# Patient Record
Sex: FEMALE | Race: WHITE | Employment: UNEMPLOYED | URBAN - METROPOLITAN AREA
[De-identification: names, ages, dates, MRNs, and addresses within clinical notes are randomized per-mention and may not be internally consistent; named-entity substitution may affect disease eponyms.]

---

## 2017-11-09 ENCOUNTER — HOSPITAL ENCOUNTER (OUTPATIENT)
Dept: MAMMOGRAPHY | Age: 42
Discharge: HOME | End: 2017-11-09
Attending: INTERNAL MEDICINE
Payer: COMMERCIAL

## 2017-11-09 DIAGNOSIS — Z12.39 BREAST CANCER SCREENING: ICD-10-CM

## 2017-11-09 PROCEDURE — 77067 SCR MAMMO BI INCL CAD: CPT

## 2019-01-07 ENCOUNTER — HOSPITAL ENCOUNTER (OUTPATIENT)
Dept: MAMMOGRAPHY | Age: 44
Discharge: HOME | End: 2019-01-07
Attending: INTERNAL MEDICINE
Payer: COMMERCIAL

## 2019-01-07 ENCOUNTER — HOSPITAL ENCOUNTER (OUTPATIENT)
Dept: ULTRASOUND IMAGING | Age: 44
Discharge: HOME | End: 2019-01-07
Attending: INTERNAL MEDICINE
Payer: COMMERCIAL

## 2019-01-07 DIAGNOSIS — Z12.31 ENCOUNTER FOR SCREENING MAMMOGRAM FOR MALIGNANT NEOPLASM OF BREAST: ICD-10-CM

## 2019-01-07 DIAGNOSIS — N85.2 HYPERTROPHY OF UTERUS: ICD-10-CM

## 2019-01-07 PROCEDURE — 76830 TRANSVAGINAL US NON-OB: CPT

## 2019-01-07 PROCEDURE — 77067 SCR MAMMO BI INCL CAD: CPT

## 2019-01-07 PROCEDURE — 76856 US EXAM PELVIC COMPLETE: CPT

## 2019-01-11 ENCOUNTER — HOSPITAL ENCOUNTER (OUTPATIENT)
Dept: MAMMOGRAPHY | Age: 44
Discharge: HOME | End: 2019-01-11
Attending: INTERNAL MEDICINE
Payer: COMMERCIAL

## 2019-01-11 ENCOUNTER — HOSPITAL ENCOUNTER (OUTPATIENT)
Dept: ULTRASOUND IMAGING | Age: 44
Discharge: HOME | End: 2019-01-11
Attending: INTERNAL MEDICINE
Payer: COMMERCIAL

## 2019-01-11 DIAGNOSIS — R92.8 ABNORMAL MAMMOGRAM: ICD-10-CM

## 2019-01-11 PROCEDURE — 76641 ULTRASOUND BREAST COMPLETE: CPT

## 2019-01-11 PROCEDURE — 76642 ULTRASOUND BREAST LIMITED: CPT

## 2019-01-11 PROCEDURE — 77065 DX MAMMO INCL CAD UNI: CPT

## 2019-01-18 ENCOUNTER — HOSPITAL ENCOUNTER (OUTPATIENT)
Dept: MAMMOGRAPHY | Age: 44
Discharge: HOME | End: 2019-01-18
Payer: COMMERCIAL

## 2019-01-18 DIAGNOSIS — N63.10 BREAST MASS, RIGHT: ICD-10-CM

## 2019-01-18 DIAGNOSIS — N63.0 BREAST MASS IN FEMALE: ICD-10-CM

## 2019-01-18 DIAGNOSIS — R92.8 ABNORMAL MAMMOGRAM: ICD-10-CM

## 2019-01-18 PROCEDURE — 74011250636 HC RX REV CODE- 250/636: Performed by: RADIOLOGY

## 2019-01-18 PROCEDURE — 19083 BX BREAST 1ST LESION US IMAG: CPT

## 2019-01-18 PROCEDURE — 77065 DX MAMMO INCL CAD UNI: CPT

## 2019-01-18 RX ORDER — LIDOCAINE HYDROCHLORIDE AND EPINEPHRINE 10; 10 MG/ML; UG/ML
8 INJECTION, SOLUTION INFILTRATION; PERINEURAL ONCE
Status: DISPENSED | OUTPATIENT
Start: 2019-01-18 | End: 2019-01-18

## 2019-01-18 RX ORDER — LIDOCAINE HYDROCHLORIDE 10 MG/ML
12 INJECTION INFILTRATION; PERINEURAL
Status: COMPLETED | OUTPATIENT
Start: 2019-01-18 | End: 2019-01-18

## 2019-01-18 RX ORDER — SODIUM BICARBONATE 42 MG/ML
5 INJECTION, SOLUTION INTRAVENOUS
Status: DISPENSED | OUTPATIENT
Start: 2019-01-18 | End: 2019-01-18

## 2019-01-18 RX ADMIN — LIDOCAINE HYDROCHLORIDE 12 ML: 10 INJECTION, SOLUTION INFILTRATION; PERINEURAL at 09:38

## 2019-01-18 NOTE — PROGRESS NOTES
10:15am  Pressure held to biopsy site. Minimal bleeding noted. Dressing remained dry and intact post breast clip mammogram.  Post biopsy discharge instructions reviewed with patient and patient given a copy. Ice pack applied over transparent dressing. No oozing hematoma or bleeding post biopsy.

## 2020-03-10 ENCOUNTER — HOSPITAL ENCOUNTER (EMERGENCY)
Age: 45
Discharge: HOME | End: 2020-03-10
Attending: EMERGENCY MEDICINE | Admitting: EMERGENCY MEDICINE
Payer: COMMERCIAL

## 2020-03-10 VITALS
DIASTOLIC BLOOD PRESSURE: 73 MMHG | HEART RATE: 77 BPM | TEMPERATURE: 98 F | RESPIRATION RATE: 14 BRPM | OXYGEN SATURATION: 100 % | HEIGHT: 64 IN | BODY MASS INDEX: 22.2 KG/M2 | WEIGHT: 130 LBS | SYSTOLIC BLOOD PRESSURE: 116 MMHG

## 2020-03-10 DIAGNOSIS — M54.42 ACUTE LEFT-SIDED LOW BACK PAIN WITH LEFT-SIDED SCIATICA: Primary | ICD-10-CM

## 2020-03-10 LAB
COMMENT, HOLDF: NORMAL
HCG SERPL QL: NEGATIVE
SAMPLES BEING HELD,HOLD: NORMAL

## 2020-03-10 PROCEDURE — 96374 THER/PROPH/DIAG INJ IV PUSH: CPT

## 2020-03-10 PROCEDURE — 74011250636 HC RX REV CODE- 250/636: Performed by: PHYSICIAN ASSISTANT

## 2020-03-10 PROCEDURE — 96375 TX/PRO/DX INJ NEW DRUG ADDON: CPT

## 2020-03-10 PROCEDURE — 74011250637 HC RX REV CODE- 250/637: Performed by: PHYSICIAN ASSISTANT

## 2020-03-10 PROCEDURE — 99283 EMERGENCY DEPT VISIT LOW MDM: CPT

## 2020-03-10 PROCEDURE — 36415 COLL VENOUS BLD VENIPUNCTURE: CPT

## 2020-03-10 PROCEDURE — 84703 CHORIONIC GONADOTROPIN ASSAY: CPT

## 2020-03-10 RX ORDER — METFORMIN HYDROCHLORIDE 1000 MG/1
2000 TABLET ORAL 2 TIMES DAILY WITH MEALS
COMMUNITY

## 2020-03-10 RX ORDER — MORPHINE SULFATE 2 MG/ML
4 INJECTION, SOLUTION INTRAMUSCULAR; INTRAVENOUS
Status: COMPLETED | OUTPATIENT
Start: 2020-03-10 | End: 2020-03-10

## 2020-03-10 RX ORDER — NALOXONE HYDROCHLORIDE 4 MG/.1ML
SPRAY NASAL
Qty: 1 EACH | Refills: 0 | Status: SHIPPED | OUTPATIENT
Start: 2020-03-10

## 2020-03-10 RX ORDER — ALPRAZOLAM 1 MG/1
TABLET ORAL
COMMUNITY

## 2020-03-10 RX ORDER — NABUMETONE 750 MG/1
750 TABLET, FILM COATED ORAL 2 TIMES DAILY
COMMUNITY

## 2020-03-10 RX ORDER — METHYLPREDNISOLONE 4 MG/1
TABLET ORAL
Qty: 1 DOSE PACK | Refills: 0 | Status: SHIPPED | OUTPATIENT
Start: 2020-03-10

## 2020-03-10 RX ORDER — SERTRALINE HYDROCHLORIDE 100 MG/1
200 TABLET, FILM COATED ORAL DAILY
COMMUNITY

## 2020-03-10 RX ORDER — CYCLOBENZAPRINE HCL 10 MG
10 TABLET ORAL
Status: COMPLETED | OUTPATIENT
Start: 2020-03-10 | End: 2020-03-10

## 2020-03-10 RX ORDER — KETOROLAC TROMETHAMINE 30 MG/ML
30 INJECTION, SOLUTION INTRAMUSCULAR; INTRAVENOUS
Status: COMPLETED | OUTPATIENT
Start: 2020-03-10 | End: 2020-03-10

## 2020-03-10 RX ORDER — VALACYCLOVIR HYDROCHLORIDE 500 MG/1
500 TABLET, FILM COATED ORAL 2 TIMES DAILY
COMMUNITY

## 2020-03-10 RX ORDER — CYCLOBENZAPRINE HCL 10 MG
10 TABLET ORAL
Qty: 10 TAB | Refills: 0 | Status: SHIPPED | OUTPATIENT
Start: 2020-03-10

## 2020-03-10 RX ORDER — DEXAMETHASONE SODIUM PHOSPHATE 10 MG/ML
10 INJECTION INTRAMUSCULAR; INTRAVENOUS
Status: COMPLETED | OUTPATIENT
Start: 2020-03-10 | End: 2020-03-10

## 2020-03-10 RX ORDER — HYDROCODONE BITARTRATE AND ACETAMINOPHEN 7.5; 325 MG/1; MG/1
1 TABLET ORAL
Qty: 15 TAB | Refills: 0 | Status: SHIPPED | OUTPATIENT
Start: 2020-03-10 | End: 2020-04-09

## 2020-03-10 RX ORDER — LAMOTRIGINE 200 MG/1
200 TABLET ORAL DAILY
COMMUNITY

## 2020-03-10 RX ADMIN — SODIUM CHLORIDE 1000 ML: 900 INJECTION, SOLUTION INTRAVENOUS at 09:59

## 2020-03-10 RX ADMIN — MORPHINE SULFATE 4 MG: 2 INJECTION, SOLUTION INTRAMUSCULAR; INTRAVENOUS at 10:20

## 2020-03-10 RX ADMIN — KETOROLAC TROMETHAMINE 30 MG: 30 INJECTION, SOLUTION INTRAMUSCULAR at 10:18

## 2020-03-10 RX ADMIN — CYCLOBENZAPRINE 10 MG: 10 TABLET, FILM COATED ORAL at 10:20

## 2020-03-10 RX ADMIN — DEXAMETHASONE SODIUM PHOSPHATE 10 MG: 10 INJECTION, SOLUTION INTRAMUSCULAR; INTRAVENOUS at 10:16

## 2020-03-10 NOTE — ED PROVIDER NOTES
Alice Vega is a 45 y.o. female, presents with mom with PMH of bulging disc in her back, chronic back pain presents to emergency room ambulatory for evaluation of low back pain, radicular pain down left leg. States she has pain with weight bearing on the left leg. She is scheduled to see her spine specialist in 2 days. Hx of LYN, has had 3, last one was 10 days ago, states the nerve pain has progressively worsened and last night the pain has worsened. Feels a 'throbbing' sensation in low back around L5-S1, states if she puts pressure on her legs she feels a \"sharp\" pain in her back. Denies acute bowel/bladder incontinence, has had urinary incontinence from childbirth but not new or worse, not saddle anesthesia. Last MRI- April 2019. Denies fever, chills, vomiting, diarrhea. Denies injury the past few days. Not currently in PT. Neurontin for neuralgia and Nabumetone (NSAID) for pain relief. States sx's are like before her 2nd LYN. [address]  PCP: Ramona Means MD    Surgical hx- see chart  Social hx- see chart    The patient has no other complaints at this time. No past medical history on file. No past surgical history on file. No family history on file.     Social History     Socioeconomic History    Marital status: LEGALLY      Spouse name: Not on file    Number of children: Not on file    Years of education: Not on file    Highest education level: Not on file   Occupational History    Not on file   Social Needs    Financial resource strain: Not on file    Food insecurity:     Worry: Not on file     Inability: Not on file    Transportation needs:     Medical: Not on file     Non-medical: Not on file   Tobacco Use    Smoking status: Not on file   Substance and Sexual Activity    Alcohol use: Not on file    Drug use: Not on file    Sexual activity: Not on file   Lifestyle    Physical activity:     Days per week: Not on file     Minutes per session: Not on file    Stress: Not on file   Relationships    Social connections:     Talks on phone: Not on file     Gets together: Not on file     Attends Cheondoism service: Not on file     Active member of club or organization: Not on file     Attends meetings of clubs or organizations: Not on file     Relationship status: Not on file    Intimate partner violence:     Fear of current or ex partner: Not on file     Emotionally abused: Not on file     Physically abused: Not on file     Forced sexual activity: Not on file   Other Topics Concern    Not on file   Social History Narrative    Not on file         ALLERGIES: Latex    Review of Systems   Constitutional: Negative. Negative for activity change, chills, fatigue and unexpected weight change. HENT: Negative for trouble swallowing. Respiratory: Negative for cough, chest tightness, shortness of breath and wheezing. Cardiovascular: Negative. Negative for chest pain and palpitations. Gastrointestinal: Negative. Negative for abdominal pain, diarrhea, nausea and vomiting. Genitourinary: Negative. Negative for dysuria, flank pain, frequency and hematuria. Musculoskeletal: Positive for back pain and gait problem. Negative for arthralgias, neck pain and neck stiffness. Skin: Negative. Negative for color change and rash. Neurological: Negative for dizziness, numbness and headaches. All other systems reviewed and are negative. Vitals:    03/10/20 0901   BP: 99/64   Pulse: 77   Resp: 14   Temp: 98 °F (36.7 °C)   SpO2: 100%   Weight: 59 kg (130 lb)   Height: 5' 4\" (1.626 m)            Physical Exam  Vitals signs and nursing note reviewed. Constitutional:       General: She is not in acute distress. Appearance: She is well-developed. She is not toxic-appearing or diaphoretic. Comments: WF, uncomfortable appearing   HENT:      Head: Normocephalic and atraumatic.    Eyes:      General:         Right eye: No discharge. Left eye: No discharge. Conjunctiva/sclera: Conjunctivae normal.      Pupils: Pupils are equal, round, and reactive to light. Neck:      Musculoskeletal: Full passive range of motion without pain and normal range of motion. Trachea: No tracheal tenderness. Cardiovascular:      Rate and Rhythm: Normal rate and regular rhythm. Pulses: Normal pulses. Heart sounds: Normal heart sounds. No murmur. No friction rub. No gallop. Pulmonary:      Effort: Pulmonary effort is normal. No respiratory distress. Breath sounds: Normal breath sounds. No wheezing or rales. Chest:      Chest wall: No tenderness. Abdominal:      General: Bowel sounds are normal. There is no distension. Palpations: Abdomen is soft. Tenderness: There is no abdominal tenderness. There is no guarding or rebound. Musculoskeletal: Normal range of motion. General: No tenderness. Skin:     General: Skin is warm and dry. Capillary Refill: Capillary refill takes less than 2 seconds. Findings: No abrasion, erythema or rash. Neurological:      Mental Status: She is alert and oriented to person, place, and time. Cranial Nerves: No cranial nerve deficit. Sensory: No sensory deficit. Coordination: Coordination normal.   Psychiatric:         Speech: Speech normal.         Behavior: Behavior normal.          MDM     Ddx: back spasm, DJD, DDD, HNP    Procedures    Patient has been re-assessed. She feels better. She has ambulated. She has no signs of cauda equina. No fever, vomiting, neuro deficits or notable weakness. Her mother has a walker she does not use and will give to patient.   Noel España PA-C    LABORATORY TESTS:  Recent Results (from the past 12 hour(s))   HCG QL SERUM    Collection Time: 03/10/20  9:58 AM   Result Value Ref Range    HCG, Ql. NEGATIVE  NEG     SAMPLES BEING HELD    Collection Time: 03/10/20  9:58 AM   Result Value Ref Range    SAMPLES BEING HELD 1LAV,1SST,1RD,1BL     COMMENT        Add-on orders for these samples will be processed based on acceptable specimen integrity and analyte stability, which may vary by analyte. MEDICATIONS GIVEN:  Medications   ketorolac (TORADOL) injection 30 mg (30 mg IntraVENous Given 3/10/20 1018)   dexamethasone (PF) (DECADRON) injection 10 mg (10 mg IntraVENous Given 3/10/20 1016)   cyclobenzaprine (FLEXERIL) tablet 10 mg (10 mg Oral Given 3/10/20 1020)   morphine injection 4 mg (4 mg IntraVENous Given 3/10/20 1020)   sodium chloride 0.9 % bolus infusion 1,000 mL (0 mL IntraVENous IV Completed 3/10/20 1154)         DISCHARGE NOTE:  The patient's results have been reviewed with them and/or available family. Patient and/or family verbally conveyed their understanding and agreement of the patient's signs, symptoms, diagnosis, treatment and prognosis and additionally agree to follow up as recommended in the discharge instructions or to return to the Emergency Room should their condition change prior to their follow-up appointment. The patient/family verbally agrees with the care-plan and verbally conveys that all of their questions have been answered. The discharge instructions have also been provided to the patient and/or family with some educational information regarding the patient's diagnosis as well a list of reasons why the patient would want to return to the ER prior to their follow-up appointment, should their condition change. Plan:  1. F/U with pain management specialist / ortho spine  2. Rx medrol dose alice, Flexeril, Norco (Narcan spray as she takes Xanax at night)  3.  Return precautions discussed and advised to return to ER if worse

## 2020-03-10 NOTE — ED TRIAGE NOTES
Patient with a known history of bulging disc here today because of the pain, and not able to perform ADLs at home.

## 2020-03-10 NOTE — DISCHARGE INSTRUCTIONS
Patient Education        Back Pain: Care Instructions  Your Care Instructions    Back pain has many possible causes. It is often related to problems with muscles and ligaments of the back. It may also be related to problems with the nerves, discs, or bones of the back. Moving, lifting, standing, sitting, or sleeping in an awkward way can strain the back. Sometimes you don't notice the injury until later. Arthritis is another common cause of back pain. Although it may hurt a lot, back pain usually improves on its own within several weeks. Most people recover in 12 weeks or less. Using good home treatment and being careful not to stress your back can help you feel better sooner. Follow-up care is a key part of your treatment and safety. Be sure to make and go to all appointments, and call your doctor if you are having problems. It's also a good idea to know your test results and keep a list of the medicines you take. How can you care for yourself at home? · Sit or lie in positions that are most comfortable and reduce your pain. Try one of these positions when you lie down:  ? Lie on your back with your knees bent and supported by large pillows. ? Lie on the floor with your legs on the seat of a sofa or chair. ? Lie on your side with your knees and hips bent and a pillow between your legs. ? Lie on your stomach if it does not make pain worse. · Do not sit up in bed, and avoid soft couches and twisted positions. Bed rest can help relieve pain at first, but it delays healing. Avoid bed rest after the first day of back pain. · Change positions every 30 minutes. If you must sit for long periods of time, take breaks from sitting. Get up and walk around, or lie in a comfortable position. · Try using a heating pad on a low or medium setting for 15 to 20 minutes every 2 or 3 hours. Try a warm shower in place of one session with the heating pad. · You can also try an ice pack for 10 to 15 minutes every 2 to 3 hours. Put a thin cloth between the ice pack and your skin. · Take pain medicines exactly as directed. ? If the doctor gave you a prescription medicine for pain, take it as prescribed. ? If you are not taking a prescription pain medicine, ask your doctor if you can take an over-the-counter medicine. · Take short walks several times a day. You can start with 5 to 10 minutes, 3 or 4 times a day, and work up to longer walks. Walk on level surfaces and avoid hills and stairs until your back is better. · Return to work and other activities as soon as you can. Continued rest without activity is usually not good for your back. · To prevent future back pain, do exercises to stretch and strengthen your back and stomach. Learn how to use good posture, safe lifting techniques, and proper body mechanics. When should you call for help? Call your doctor now or seek immediate medical care if:    · You have new or worsening numbness in your legs.     · You have new or worsening weakness in your legs. (This could make it hard to stand up.)     · You lose control of your bladder or bowels.    Watch closely for changes in your health, and be sure to contact your doctor if:    · You have a fever, lose weight, or don't feel well.     · You do not get better as expected. Where can you learn more? Go to http://akiko-zach.info/. Enter G773 in the search box to learn more about \"Back Pain: Care Instructions. \"  Current as of: June 26, 2019  Content Version: 12.2  © 3225-9395 [employer]. Care instructions adapted under license by [employer] (which disclaims liability or warranty for this information). If you have questions about a medical condition or this instruction, always ask your healthcare professional. [place] [address] any warranty or liability for your use of this information.          Patient Education        Back Pain, Emergency or Urgent Symptoms: Care Instructions  Your Care Instructions    Many people have back pain at one time or another. In most cases, pain gets better with self-care that includes over-the-counter pain medicine, ice, heat, and exercises. Unless you have symptoms of a severe injury or heart attack, you may be able to give yourself a few days before you call a doctor. But some back problems are very serious. Do not ignore symptoms that need to be checked right away. Follow-up care is a key part of your treatment and safety. Be sure to make and go to all appointments, and call your doctor if you are having problems. It's also a good idea to know your test results and keep a list of the medicines you take. How can you care for yourself at home? · Sit or lie in positions that are most comfortable and that reduce your pain. Try one of these positions when you lie down:  ? Lie on your back with your knees bent and supported by large pillows. ? Lie on the floor with your legs on the seat of a sofa or chair. ? Lie on your side with your knees and hips bent and a pillow between your legs. ? Lie on your stomach if it does not make pain worse. · Do not sit up in bed, and avoid soft couches and twisted positions. Bed rest can help relieve pain at first, but it delays healing. Avoid bed rest after the first day. · Change positions every 30 minutes. If you must sit for long periods of time, take breaks from sitting. Get up and walk around, or lie flat. · Try using a heating pad on a low or medium setting, for 15 to 20 minutes every 2 or 3 hours. Try a warm shower in place of one session with the heating pad. You can also buy single-use heat wraps that last up to 8 hours. You can also try ice or cold packs on your back for 10 to 20 minutes at a time, several times a day. (Put a thin cloth between the ice pack and your skin.) This reduces pain and makes it easier to be active and exercise. · Take pain medicines exactly as directed.   ? If the doctor gave you a prescription medicine for pain, take it as prescribed. ? If you are not taking a prescription pain medicine, ask your doctor if you can take an over-the-counter medicine. When should you call for help? Call 911 anytime you think you may need emergency care. For example, call if:    · You are unable to move a leg at all.     · You have back pain with severe belly pain.     · You have symptoms of a heart attack. These may include:  ? Chest pain or pressure, or a strange feeling in the chest.  ? Sweating. ? Shortness of breath. ? Nausea or vomiting. ? Pain, pressure, or a strange feeling in the back, neck, jaw, or upper belly or in one or both shoulders or arms. ? Lightheadedness or sudden weakness. ? A fast or irregular heartbeat. After you call 911, the  may tell you to chew 1 adult-strength or 2 to 4 low-dose aspirin. Wait for an ambulance. Do not try to drive yourself.    Call your doctor now or seek immediate medical care if:    · You have new or worse symptoms in your arms, legs, chest, belly, or buttocks. Symptoms may include:  ? Numbness or tingling. ? Weakness. ? Pain.     · You lose bladder or bowel control.     · You have back pain and:  ? You have injured your back while lifting or doing some other activity. Call if the pain is severe, has not gone away after 1 or 2 days, and you cannot do your normal daily activities. ? You have had a back injury before that needed treatment. ? Your pain has lasted longer than 4 weeks. ? You have had weight loss you cannot explain. ? You have a fever. ? You are age 48 or older. ? You have cancer now or have had it before.    Watch closely for changes in your health, and be sure to contact your doctor if you are not getting better as expected. Where can you learn more? Go to http://akiko-zach.info/.   Enter E091 in the search box to learn more about \"Back Pain, Emergency or Urgent Symptoms: Care Instructions. \"  Current as of: June 26, 2019  Content Version: 12.2  © 0330-8185 [employer]. Care instructions adapted under license by [employer] (which disclaims liability or warranty for this information). If you have questions about a medical condition or this instruction, always ask your healthcare professional. [place] [address] any warranty or liability for your use of this information.

## 2020-03-10 NOTE — ED NOTES
Patient does not appear to be in any acute distress/shows no evidence of clinical instability at this time. Provider has reviewed discharge instructions with the patient/family. The patient/family verbalized understanding instructions as well as need for follow up for any further symptoms. Discharge papers given, education provided, and any questions answered. Patient discharged by provider and was ambulatory out of the ED.

## 2020-11-20 ENCOUNTER — HOSPITAL ENCOUNTER (OUTPATIENT)
Dept: PHYSICAL THERAPY | Age: 45
Discharge: HOME | End: 2020-11-20
Payer: COMMERCIAL

## 2020-11-20 PROCEDURE — 97162 PT EVAL MOD COMPLEX 30 MIN: CPT | Performed by: PHYSICAL MEDICINE & REHABILITATION

## 2020-11-20 PROCEDURE — 97530 THERAPEUTIC ACTIVITIES: CPT | Performed by: PHYSICAL MEDICINE & REHABILITATION

## 2020-11-20 NOTE — PROGRESS NOTES
Physical Therapy at [facility],   a part of  [facility]  [place]  [facility] [place]  Phone: [phone]  Fax: [phone]    Plan of Care/ Statement of Necessity for Physical Therapy Services 2-15    Patient name: Felton Thomas  : 1975  Provider#: [id number]  Referral source: Jac Kuo MD      Medical/Treatment Diagnosis: Fecal incontinence [R15.9]     Prior Hospitalization: see medical history     Comorbidities: extensive, see chart  Prior Level of Function: chronic FI, new onset IC  Medications: Verified on Patient Summary List    Start of Care: 20      Onset Date: 6+ mos      The Plan of Care and following information is based on the information from the initial evaluation. Assessment/ key information: Pt with chronic fecal incontinence and interstitial cystitis presents with poor intake and bladder habits. History of lumbar dysfunction may be contributory. Psych history may be contributory. Pelvic exam deferred today at patient's request.  Assessment is ongoing. She will benefit from skilled PT services to address her limitations and achieve functional goals as stated on the plan of care. Evaluation Complexity History MEDIUM  Complexity : 1-2 comorbidities / personal factors will impact the outcome/ POC ; Examination MEDIUM Complexity : 3 Standardized tests and measures addressing body structure, function, activity limitation and / or participation in recreation  ;Presentation MEDIUM Complexity : Evolving with changing characteristics  ; Clinical Decision Making MEDIUM Complexity : FOTO score of 26-74  Overall Complexity Rating: MEDIUM    Problem List: pain affecting function, decrease ADL/ functional abilitiies, decrease activity tolerance and other involuntary fecal loss with IADLs   Treatment Plan may include any combination of the following: Therapeutic exercise, Therapeutic activities, Neuromuscular re-education, Physical agent/modality, Manual therapy, Patient education and Self Care training  Patient / Family readiness to learn indicated by: asking questions  Persons(s) to be included in education: patient (P)  Barriers to Learning/Limitations: None  Patient Goal (s): no bladder pain  Patient Self Reported Health Status: good  Rehabilitation Potential: good    Short Term Goals: To be accomplished in 6 weeks:  Patient will be independent with a progressive home exercise program    Patient will complete pelvic exam.   Patient will demonstrate & utilized pelvic floor ms protection techniques   Patient will be independent with urge suppression techniques, bladder irritant elmination   Patient will complete a 72 hour bladder diary for analysis      Long Term Goals: To be accomplished in 12 weeks:  Patient will demonstrate good PFM recruitment and derecruitment on sEMG biofeedback vaginally. Patient will demonstrate normal PFM resting tone on manual exam and with sEMG biofeedback testing vaginally. Patient will have no loss of stool with IADLs. Pt will be seen 1 times per week for 6-12 weeks. Patient/ Caregiver education and instruction: self care    Raisa Hopkins, PT, MSPT   11/20/2020     ________________________________________________________________________    I certify that the above Therapy Services are being furnished while the patient is under my care. I agree with the treatment plan and certify that this therapy is necessary.     [de-identified] Signature:____________________  Date:____________Time: _________

## 2020-11-20 NOTE — PROGRESS NOTES
PT INITIAL EVALUATION NOTE 2-15    Patient Name: Heather Machuca  Date:2020  : 1975  [x]  Patient  Verified  Payor: BLUE CROSS / Plan: [address] [place] [address] / Product Type: PPO /    In time: 845  Out time: 945  Total Treatment Time (min): 60  Visit #: 1    Treatment Area: Fecal incontinence [R15.9]    SUBJECTIVE  Pain Level (0-10 scale): Any medication changes, allergies to medications, adverse drug reactions, diagnosis change, or new procedure performed?: [] No    [x] Yes (see summary sheet for update)  Subjective:       Patient is a 39year old female with complaints of urinary urgency and frequency, pelvic burning and pressure after urination, fecal incontinence that started late July. Underwent endometrial ablation late . She was hospitilized in July for inpatient psych stay related to OD and cannot recall whether or not she was catherized. She feels these two episodes in  and July may be contributory. She has been on 3 antibiotics with no relief, cultures are clear. Recently saw Dr. Marycruz Luna, diagnosed with IC. She wants to learn how to decrease IC pain and manage symptoms. She has a long history of fecal incontinence which she reports she \"just deals with\" using padding. She has had pelvic PT in the past for fecal incontinence including rectal balloon training which she found to be very stressful. She does not want to repeat the rectal training she did before. She notes she is anxious about today's visit. Has a BM every morning rated [place]man 5-6. When she leaks the stool is soft or watery 6-7. She was taking metformin at one point and noticed much looser stool with increased leaking. She is no long on metformin. Recently started prelief, notes some improvement with this.       PMH  Bipolar disorder  Hemorrhoidectomy 2005  Anal fissure repair & anorectal fistula repair with plug  LBP 2018 with multiple steroid injections L5/S1 through 2020 Ablation of uterus   7/2020 Psych admission   8/2020 started having bladder discomfort, several rounds of abx        PLOF: chronic FI, more recent onset bladder pain  Mechanism of Injury: none  Previous Treatment/Compliance: bad experience with previous pelvic PT  PMHx/Surgical Hx: see chart  Work Hx: volunteer work  Living Situation: living with parents  Pt Goals: no pain  Barriers: emotional  Motivation: fair  Substance use: none stated   Cognition: A & O x 4        OBJECTIVE/EXAMINATION    Pt agitated appearning  Slump sitting during interview, forward head, forward rounded shoulder posture. Pelvic exam deferred today at patient's request.   Focused on education. Assessment is ongoing. 30 min Therapeutic Activity:  []  See flow sheet :  Patient instructed in the role of physical therapy in the evaluation and treatment of pelvic floor dysfunction, applicable treatment modalities discussed. Expectations for regular home exercise participation and expected visit frequency in to achieve the patient's goals were discussed. Patient instructed in pelvic floor anatomy and function including levator ani, puborectalis and superficial pelvic  musculature. Patient was provided dietary information to improve stool quality including increasing soluble fiber intake (Bran Buds), probiotics (Activia yogurt) and increased water intake (6-8 glasses a day). Pt instructed in use of Morgan stool chart to track progress. Patient provided information on bladder diary completion, will collect intake/measured output data for 72 hours and return for analysis. Patient educated in proper defecation posture and technique including use of Squatty Potty for better puborectalis ms relaxation. Pt to avoid straining to pass stool in order to decrease strain on pelvic floor.          Rationale: Improve patient understanding, change daily habits  to improve the patients ability to eliminate FI with IADLs With   [] TE   [x] TA   [] neuro   [] other: Patient Education: [x] Review HEP    [] Progressed/Changed HEP based on:   [] positioning   [] body mechanics   [] transfers   [] heat/ice application    [x] other:   Pt to complete 72 hour bladder diary with intake/output measures. Other Objective/Functional Measures:  Pt demonstrated good understanding of concepts discussed today. Demonstrated improved ease with therapist by the end of session. Pain Level (0-10 scale) post treatment:  0      ASSESSMENT:   Pt with chronic fecal incontinence and interstitial cystitis presents with poor intake and bladder habits. History of lumbar dysfunction may be contributory. Psych history may be contributory. Pelvic exam deferred today at patient's request.  Assessment is ongoing. She will benefit from skilled PT services to address her limitations and achieve functional goals as stated on the plan of care.         [x]  See Plan of Ariella Rizzo, PT, MSPT 11/20/2020

## 2020-11-30 ENCOUNTER — HOSPITAL ENCOUNTER (OUTPATIENT)
Dept: PHYSICAL THERAPY | Age: 45
Discharge: HOME | End: 2020-11-30
Payer: COMMERCIAL

## 2020-11-30 PROCEDURE — 97530 THERAPEUTIC ACTIVITIES: CPT | Performed by: PHYSICAL MEDICINE & REHABILITATION

## 2020-11-30 PROCEDURE — 97110 THERAPEUTIC EXERCISES: CPT | Performed by: PHYSICAL MEDICINE & REHABILITATION

## 2020-11-30 NOTE — PROGRESS NOTES
PT DAILY TREATMENT NOTE 2-15    Patient Name: Felton Thomas  Date:2020  : 1975  [x]  Patient  Verified  Payor: [employer] / Plan: [address] [place] [address] / Product Type: PPO /    In time: 315  Out time: 0400  Total Treatment Time (min): 45  Visit #:  2    Treatment Area: Fecal incontinence [R15.9]    SUBJECTIVE  Pain Level (0-10 scale): 5  Any medication changes, allergies to medications, adverse drug reactions, diagnosis change, or new procedure performed?: [x] No    [] Yes (see summary sheet for update)  Subjective functional status/changes:   [] No changes reported  Taking Align x 1 week and 1/4c Bran Buds with noticeable improvement in stool quality   Noted less leaking with improved stool quality. Notes more bladder pain and bowel upset today \"today has been a very stressful day\"    Has Bladder diary ready for review. OBJECTIVE      15 min Therapeutic Exercise:  [] See flow sheet :PFMT down training in seated position, exhale with effort, reduce clench, increased awareness (introductory). Rationale: improve coordination to improve the patients ability to decrease pelvic pain with ADLs    30 min Therapeutic Activity:  []  See flow sheet :    Patient instructed in pelvic floor anatomy and function including levator ani, puborectalis and superficial pelvic  musculature. Patient educated in urinary urge suppression techniques including the KNACK, quick flicks, calmly walking rather than rushing to the toilet, nervous system down training. Patient educated in proper defecation posture and technique including use of Squatty Potty for better puborectalis ms relaxation. Pt to avoid straining to pass stool in order to decrease strain on pelvic floor. Pt educated in bladder irritants, proper hydration, timing of hydration. Rationale: improve coordination  to improve the patients ability to eliminate functional outlet constipation/FI. With   [x] TE   [x] TA   [] neuro   [] other: Patient Education: [x] Review HEP    [] Progressed/Changed HEP based on:   [] positioning   [] body mechanics   [] transfers   [] heat/ice application    [x] other:     Pt to purchase Squatty Potty, reduce caffeine and bladder irritants, frontload water in the day. Other Objective/Functional Measures:  Pt demonstrated good understanding of concepts discussed today. Pain Level (0-10 scale) post treatment: 4    ASSESSMENT/Changes in Function:     Patient will continue to benefit from skilled PT services to modify and progress therapeutic interventions to attain remaining goals. [x]  See Plan of Care  []  See progress note/recertification  []  See Discharge Summary         Progress towards goals / Updated goals:Able to advance exercises today with good tolerance. Pt continues to need instruction for correct exercise form and performance. Continues to demonstrate good potential to achieve functional goals stated on the plan of care.       PLAN  [x]  Upgrade activities as tolerated     []  Continue plan of care  []  Update interventions per flow sheet       []  Discharge due to:_  []  Other:_      Long Bill PT,MSPT    11/30/2020

## 2020-12-07 ENCOUNTER — HOSPITAL ENCOUNTER (OUTPATIENT)
Dept: PHYSICAL THERAPY | Age: 45
Discharge: HOME | End: 2020-12-07
Payer: COMMERCIAL

## 2020-12-07 PROCEDURE — 97110 THERAPEUTIC EXERCISES: CPT | Performed by: PHYSICAL MEDICINE & REHABILITATION

## 2020-12-07 NOTE — PROGRESS NOTES
PT DAILY TREATMENT NOTE 2-15    Patient Name: Danitza Mulligan  Date:2020  : 1975  [x]  Patient  Verified  Payor: BLUE CROSS / Plan: [address] [place] [address] / Product Type: PPO /    In time: 845   Out time: 930  Total Treatment Time (min): 45  Visit #:  3    Treatment Area: Fecal incontinence [R15.9]    SUBJECTIVE  Pain Level (0-10 scale): 5  Any medication changes, allergies to medications, adverse drug reactions, diagnosis change, or new procedure performed?: [x] No    [] Yes (see summary sheet for update)  Subjective functional status/changes:   [] No changes reported  Bladder pain is much worse, she is trying to see Dr. Maritza Rinaldi. Feels like a bladder infection. No FI since last visit, Fiber is helping. OBJECTIVE    External Pelvic Exam  Non tender through external pelvic clock  No POP at rest or with sustained valsalva  Active contraction: present  Active relaxation: absent  Involuntary relaxation: absent    Internal Vaginal Exam:  Layer 1 non tender  Layer 2/3  Hypertonic bilaterally through levator ani L > R  Bladder neck mobility: WNL    PERFECT SCORE CHART  P =  Power (Laycock Scale Grade 0-5)  4  E =  Endurance (How long pt holds max contraction)  4  R =  Repetitions (How many times the repeats holds)  5  F =  Fast Twitch (How many 1 second contractions in 10 seconds)  5  E =  Elevation (Lift of post vaginal wall toward pubic bone) present  C =  Coordinated cocontraction of transverse abdominus absent  T = Timing (squeeze and lift with cough)  Absent     45 min Therapeutic Exercise:  [] See flow sheet :    PFMT with emphasis on breath coordination, down training, relaxation and bulge. Access Code: [email or web address]   URL: [email or web address]   Date: 2020   Prepared by: Claire Cornell     Exercises   Seated Figure 4 Piriformis Stretch - 4 reps - 30 seconds hold - 1x daily - 7x weekly   Seated Hamstring Stretch - 4 reps - 30 seconds hold - 1x daily - 7x weekly   Half Kneeling Hip Flexor Stretch - 4 reps - 30 seconds hold - 1x daily - 7x weekly   Diaphragmatic Breathing in Child's Pose with Pelvic Floor Relaxation - 4 reps - 30 seconds hold - 1x daily - 7x weekly   Diaphragmatic Breathing in Supported Child's Pose with Pelvic Floor Relaxation - 60 seconds hold - 1x daily - 7x weekly        Rationale: improve coordination and increase proprioception to improve the patients ability to eliminate FI            With   [x] TE   [x] TA   [] neuro   [] other: Patient Education: [x] Review HEP    [] Progressed/Changed HEP based on:   [x] positioning   [x] body mechanics   [] transfers   [] heat/ice application    [] other:      Other Objective/Functional Measures:   Demonstrated good understanding of all concepts discussed today     Pain Level (0-10 scale) post treatment: 3    ASSESSMENT/Changes in Function:   Hypertonic PFM with multiple trigger points L > R. Patient will continue to benefit from skilled PT services to modify and progress therapeutic interventions to attain remaining goals. [x]  See Plan of Care  []  See progress note/recertification  []  See Discharge Summary         Progress towards goals / Updated goals:  Able to advance exercises today with good tolerance. Pt continues to need instruction for correct exercise form and performance. Continues to demonstrate good potential to achieve functional goals stated on the plan of care.       PLAN  [x]  Upgrade activities as tolerated     []  Continue plan of care  []  Update interventions per flow sheet       []  Discharge due to:_  []  Other:_      Gene Pittman PT, MSPT   12/7/2020

## 2020-12-14 ENCOUNTER — HOSPITAL ENCOUNTER (OUTPATIENT)
Dept: PHYSICAL THERAPY | Age: 45
Discharge: HOME | End: 2020-12-14
Payer: COMMERCIAL

## 2020-12-14 PROCEDURE — 97110 THERAPEUTIC EXERCISES: CPT | Performed by: PHYSICAL MEDICINE & REHABILITATION

## 2020-12-14 NOTE — PROGRESS NOTES
PT DAILY TREATMENT NOTE 2-15    Patient Name: Zoe Handley  Date:2020  : 1975  [x]  Patient  Verified  Payor: BLUE CROSS / Plan: [address] [place] [address] / Product Type: PPO /    In time: 845  Out time: 930  Total Treatment Time (min): 45  Visit #:  4    Treatment Area: Fecal incontinence [R15.9]    SUBJECTIVE  Pain Level (0-10 scale): 2  Any medication changes, allergies to medications, adverse drug reactions, diagnosis change, or new procedure performed?: [x] No    [] Yes (see summary sheet for update)  Subjective functional status/changes:   [] No changes reported  Bladder pain much improved, not sure why. She has cut back on caffeine intake. Having more L lateral thigh pain and LBP today \"Sciatica is acting up\"  Had one episode of wetting the bed, feels this was due to [address] causing her to sleep heavily  She is struggling with waking 2-3x per night to urinate, feels this could be due to new medication she takes at bedtime. OBJECTIVE      45 min Therapeutic Exercise:  [x] See flow sheet :   Rationale: increase ROM, improve coordination and increase proprioception to improve the patients ability to reduce bladder pain and eliminate UI. With   [x] TE   [] TA   [] neuro   [] other: Patient Education: [x] Review HEP    [x] Progressed/Changed HEP based on:   [x] positioning   [x] body mechanics   [] transfers   [] heat/ice application    [] other:      Other Objective/Functional Measures:   Noted full centrilization of L lateral thigh parasthesias following exs today. Pain Level (0-10 scale) post treatment: 1    ASSESSMENT/Changes in Function:   Pt has history of L5/SI nerve root impingement, discussed the fact that these nerve roots do impact the bladder and pelvic floor. Focused today on L5/S1 decompression to see if it would further decrease bladder pain.     Pt has successfully made changes to her intake habits, continues to struggle with night waking, advised patient to discuss nighttime urinary frequency with her med prescribing physician. Will initiate PFM trigger point release next visit. Patient will continue to benefit from skilled PT services to modify and progress therapeutic interventions to attain remaining goals. [x]  See Plan of Care  []  See progress note/recertification  []  See Discharge Summary         Progress towards goals / Updated goals:  Able to advance exercises today with good tolerance. Pt continues to need instruction for correct exercise form and performance. Continues to demonstrate good potential to achieve functional goals stated on the plan of care. PLAN  [x]  Upgrade activities as tolerated     []  Continue plan of care  []  Update interventions per flow sheet       []  Discharge due to:_  [x]  Other:  Initiate biofeedback, internal trigger point release as indicated next visit.        Rhett Cook, PT, MSPT 12/14/2020

## 2020-12-21 ENCOUNTER — HOSPITAL ENCOUNTER (OUTPATIENT)
Dept: PHYSICAL THERAPY | Age: 45
Discharge: HOME | End: 2020-12-21
Payer: COMMERCIAL

## 2020-12-21 PROCEDURE — 97110 THERAPEUTIC EXERCISES: CPT | Performed by: PHYSICAL MEDICINE & REHABILITATION

## 2020-12-21 PROCEDURE — 97112 NEUROMUSCULAR REEDUCATION: CPT | Performed by: PHYSICAL MEDICINE & REHABILITATION

## 2020-12-21 NOTE — PROGRESS NOTES
PT DAILY TREATMENT NOTE 2-15    Patient Name: Zoe Handley  Date:2020  : 1975  [x]  Patient  Verified  Payor: BLUE CROSS / Plan: [address] [place] [address] / Product Type: PPO /    In time: 845   Out time: 930  Total Treatment Time (min): 45  Visit #: 5    Treatment Area: Fecal incontinence [R15.9]    SUBJECTIVE  Pain Level (0-10 scale): 0   Any medication changes, allergies to medications, adverse drug reactions, diagnosis change, or new procedure performed?: [x] No    [] Yes (see summary sheet for update)  Subjective functional status/changes:   [] No changes reported  Stopped Bran Buds and Align x 2 days, noticed loose stool and had some staining. Stopped Tramadol at night and noticed no night time leaking. OBJECTIVE    30 min Therapeutic Exercise:  [] See flow sheet :   Rationale: increase strength and improve coordination to improve the patients ability to eliminate FI and UI. 15 min Neuromuscular Re-education:  []  See flow sheet :  sEMG biofeedback with vaginal sensor, constant PT attendance for coaching. Rationale: increase strength and improve coordination  to improve the patients ability to elimiante FI and UI. With   [x] TE   [x] TA   [] neuro   [] other: Patient Education: [x] Review HEP    [x] Progressed/Changed HEP based on:   [] positioning   [] body mechanics   [] transfers   [] heat/ice application    [x] other:     PFMT holds 5/5     Other Objective/Functional Measures:   Demonstrated good understanding of exs initiated today. sEMG Biofeedback Assessment:   10s Work Average:  16.6mV    Max Effort  27mV    10s Rest Average 5.76mV   See readout in chart. Pain Level (0-10 scale) post treatment: 0    ASSESSMENT/Changes in Function:   Notices more FI with PATIENTS [facility] 6/7 stool. Advised pt to discuss her meds with her MD to determine if recent change is impacting bowel/bladder.      Patient will continue to benefit from skilled PT services to modify and progress therapeutic interventions to attain remaining goals. [x]  See Plan of Care  []  See progress note/recertification  []  See Discharge Summary         Progress towards goals / Updated goals:  Able to advance exercises today with good tolerance. Pt continues to need instruction for correct exercise form and performance. Continues to demonstrate good potential to achieve functional goals stated on the plan of care.       PLAN  [x]  Upgrade activities as tolerated     []  Continue plan of care  []  Update interventions per flow sheet       []  Discharge due to:_  []  Other:_      Mignon Morales PT, MSPT 12/21/2020

## 2020-12-28 ENCOUNTER — APPOINTMENT (OUTPATIENT)
Dept: PHYSICAL THERAPY | Age: 45
End: 2020-12-28
Payer: COMMERCIAL

## 2021-01-04 ENCOUNTER — HOSPITAL ENCOUNTER (OUTPATIENT)
Dept: PHYSICAL THERAPY | Age: 46
Discharge: HOME | End: 2021-01-04
Payer: COMMERCIAL

## 2021-01-04 PROCEDURE — 97112 NEUROMUSCULAR REEDUCATION: CPT | Performed by: PHYSICAL MEDICINE & REHABILITATION

## 2021-01-04 PROCEDURE — 97530 THERAPEUTIC ACTIVITIES: CPT | Performed by: PHYSICAL MEDICINE & REHABILITATION

## 2021-01-04 PROCEDURE — 97110 THERAPEUTIC EXERCISES: CPT | Performed by: PHYSICAL MEDICINE & REHABILITATION

## 2021-01-04 NOTE — PROGRESS NOTES
PT DAILY TREATMENT NOTE 2-15    Patient Name: Mary Lou Conway  Date:2021  : 1975  [x]  Patient  Verified  Payor: KAE SAGE / Plan: [address] [place] [address] / Product Type: PPO /    In time: 845  Out time: 930  Total Treatment Time (min): 45  Visit #:  6    Treatment Area: Fecal incontinence [R15.9]    SUBJECTIVE  Pain Level (0-10 scale): Any medication changes, allergies to medications, adverse drug reactions, diagnosis change, or new procedure performed?: [x] No    [] Yes (see summary sheet for update)  Subjective functional status/changes:   [] No changes reported  She went out of town unexpectedly last week, she came off her fiber and probiotics, had regular coffee, slept poorly and all of her symptoms came back, she also experienced more anxiety and needed extra meds. She realizes diet is a major contributing factor and she needs to stick to her diet management long term  She had some pelvic floor \"spasms\" for three days after last visit, feels this was related to kegals. She has been home since Tuesday, back on schedule, urinary symptoms improved. She is noting less fecal smearing as her stool quality improves restarting bran. OBJECTIVE    30 min Therapeutic Exercise:  [] See flow sheet :  PFMT with emphasis on breath coordination, submax contraction and bulge. Rationale: increase ROM, increase strength and improve coordination to improve the patients ability to eliminate UI and FI with IADLs      15 min Neuromuscular Re-education:  []  See flow sheet :  sEMG biofeedback training with constant PT attendance for guidance.     Rationale: increase strength and improve coordination  to improve the patients ability to eliminate UI and FI with ADLs            With   [x] TE   [x] TA   [] neuro   [] other: Patient Education: [x] Review HEP    [] Progressed/Changed HEP based on:   [] positioning   [] body mechanics   [] transfers   [] heat/ice application    [x] other: Submax contractions with bulge on relax, Exhale with effort. Restart fiber and dietary measures that were helping previously. Other Objective/Functional Measures:  --     Pain Level (0-10 scale) post treatment: 0    ASSESSMENT/Changes in Function:     Patient will continue to benefit from skilled PT services to modify and progress therapeutic interventions to attain remaining goals. []  See Plan of Care  []  See progress note/recertification  []  See Discharge Summary         Progress towards goals / Updated goals:  Able to advance exercises today with good tolerance. Pt continues to need instruction for correct exercise form and performance. Continues to demonstrate good potential to achieve functional goals stated on the plan of care.       PLAN  [x]  Upgrade activities as tolerated     []  Continue plan of care  []  Update interventions per flow sheet       []  Discharge due to:_  []  Other:_      Reynold Harris, PT, MSPT 1/4/2021

## 2021-01-11 ENCOUNTER — HOSPITAL ENCOUNTER (OUTPATIENT)
Dept: PHYSICAL THERAPY | Age: 46
Discharge: HOME | End: 2021-01-11
Payer: COMMERCIAL

## 2021-01-11 PROCEDURE — 97110 THERAPEUTIC EXERCISES: CPT | Performed by: PHYSICAL MEDICINE & REHABILITATION

## 2021-01-11 PROCEDURE — 97112 NEUROMUSCULAR REEDUCATION: CPT | Performed by: PHYSICAL MEDICINE & REHABILITATION

## 2021-01-11 NOTE — PROGRESS NOTES
Physical Therapy at [facility],   a part of [address]  [place] [address] [place] [address], [address] [place] [address] Drive  Phone: [phone]      Fax:  [phone]    Progress Note    Name: Francoise Murphy   : 1975   MD: Florenica Llanes MD       Treatment Diagnosis: Fecal incontinence [R15.9]  Start of Care: 20    Visits from Start of Care: 5  Missed Visits: 0    Summary of Care:  Pt referred to pelvic PT for evaluation and treatment of urinary dysfunction and bladder pain related to interstitial cystitis, fecal incontinence. Treatment consisted of manual therapy, therapeutic exercise, therapeutic activity, bladder health education, bowel habit education, biofeedback assisted pelvic floor training, home exercise program development and progression. She has responded very well to initial dietary changes and bladder habit changes with improved bowel and bladder function. She notes improved fecal control with improved stool consistency. sEMG biofeedback training initiated today, she demonstrates decreased pelvic floor ms recruitment, coordination and endurance consistent with manual exam.  With initial biofeedback training she demonstrates improved coordination and understanding of how to use pelvic floor ms for UI and FI control. She will benefit from continued skilled PT services to address her limitations and achieve her functional goals as stated on the plan of care.      Assessment / Recommendations:     Short Term Goals: To be accomplished in 6 weeks:  Patient will be independent with a progressive home exercise program  MET   Patient will complete pelvic exam. MET  Patient will demonstrate & utilized pelvic floor ms protection techniques MET  Patient will be independent with urge suppression techniques, bladder irritant elmination PROGRESSING  Patient will complete a 72 hour bladder diary for analysis  MET        Long Term Goals: To be accomplished in 12 weeks:  PROGRESSING   Patient will demonstrate good PFM recruitment and derecruitment on sEMG biofeedback vaginally. Patient will demonstrate normal PFM resting tone on manual exam and with sEMG biofeedback testing vaginally. Patient will have no loss of stool with IADLs.    Continue PT 1x per week x 8 weeks. Nancy Lambert, PT, MSPT     1/11/2021    ________________________________________________________________________  NOTE TO PHYSICIAN:  Please complete the following and fax to:  Physical Therapy at [facility],   a part of [facility]: [phone]  . Retain this original for your records. If you are unable to process this request in 24 hours, please contact our office.        ____ I have read the above report and request that my patient continue therapy with the following changes/special instructions:  ____ I have read the above report and request that my patient be discharged from therapy    Physician's Signature:_________________ Date:___________Time:__________

## 2021-01-11 NOTE — PROGRESS NOTES
PT DAILY TREATMENT NOTE 2-15    Patient Name: Regla Yang  Date:2021  : 1975  [x]  Patient  Verified  Payor: BLUE CROSS / Plan: [address] [place] [address] / Product Type: PPO /    In time: 845  Out time: 930  Total Treatment Time (min): 45  Visit #:  7    Treatment Area: Fecal incontinence [R15.9]    SUBJECTIVE  Pain Level (0-10 scale): Any medication changes, allergies to medications, adverse drug reactions, diagnosis change, or new procedure performed?: [x] No    [] Yes (see summary sheet for update)  Subjective functional status/changes:   [] No changes reported  Has resumed good bowel and bladder habits and has noted improved function, no UI or FI. OBJECTIVE    30 min Therapeutic Exercise:  [] See flow sheet :  PFMT with emphasis on breath coordination, submax contraction and bulge. Rationale: increase ROM, increase strength and improve coordination to improve the patients ability to eliminate UI and FI with IADLs      15 min Neuromuscular Re-education:  []  See flow sheet : Vaginal sensor sEMG biofeedback training with constant PT attendance for guidance through manual and verbal cues. Rationale: increase strength and improve coordination  to improve the patients ability to eliminate UI and FI with ADLs            With   [x] TE   [x] TA   [] neuro   [] other: Patient Education: [x] Review HEP    [] Progressed/Changed HEP based on:   [] positioning   [] body mechanics   [] transfers   [] heat/ice application    [x] other:  Submax contractions with bulge on relax, Exhale with effort. Restart fiber and dietary measures that were helping previously. Other Objective/Functional Measures:    Demonstrates improved PFM recruitment and coordination on sEMG biofeedback.        Pain Level (0-10 scale) post treatment: 0    ASSESSMENT/Changes in Function:      Patient will continue to benefit from skilled PT services to modify and progress therapeutic interventions to attain remaining goals. []  See Plan of Care  []  See progress note/recertification  []  See Discharge Summary         Progress towards goals / Updated goals:  Able to advance exercises today with good tolerance. Pt continues to need instruction for correct exercise form and performance. Continues to demonstrate good potential to achieve functional goals stated on the plan of care.       PLAN  [x]  Upgrade activities as tolerated     []  Continue plan of care  []  Update interventions per flow sheet       []  Discharge due to:_  []  Other:_      Jackie Cordova PT, MSPT 1/11/2021

## 2021-01-18 ENCOUNTER — APPOINTMENT (OUTPATIENT)
Dept: PHYSICAL THERAPY | Age: 46
End: 2021-01-18
Payer: COMMERCIAL

## 2021-01-25 ENCOUNTER — APPOINTMENT (OUTPATIENT)
Dept: PHYSICAL THERAPY | Age: 46
End: 2021-01-25
Payer: COMMERCIAL

## 2022-10-21 ENCOUNTER — TRANSCRIBE ORDER (OUTPATIENT)
Dept: SCHEDULING | Age: 47
End: 2022-10-21

## 2022-10-21 DIAGNOSIS — Z12.31 SCREENING MAMMOGRAM FOR HIGH-RISK PATIENT: Primary | ICD-10-CM

## 2022-10-24 ENCOUNTER — HOSPITAL ENCOUNTER (OUTPATIENT)
Dept: MAMMOGRAPHY | Age: 47
Discharge: HOME | End: 2022-10-24
Attending: FAMILY MEDICINE
Payer: COMMERCIAL

## 2022-10-24 DIAGNOSIS — Z12.31 SCREENING MAMMOGRAM FOR HIGH-RISK PATIENT: ICD-10-CM

## 2022-10-24 PROCEDURE — 77063 BREAST TOMOSYNTHESIS BI: CPT

## 2024-02-06 ENCOUNTER — HOSPITAL ENCOUNTER (INPATIENT)
Facility: HOSPITAL | Age: 49
LOS: 3 days | Discharge: ANOTHER HOSPITAL | End: 2024-02-09
Attending: EMERGENCY MEDICINE | Admitting: INTERNAL MEDICINE
Payer: COMMERCIAL

## 2024-02-06 DIAGNOSIS — F10.10 ALCOHOL ABUSE: ICD-10-CM

## 2024-02-06 DIAGNOSIS — R45.851 SUICIDAL IDEATION: Primary | ICD-10-CM

## 2024-02-06 DIAGNOSIS — T50.902A INTENTIONAL DRUG OVERDOSE, INITIAL ENCOUNTER (HCC): ICD-10-CM

## 2024-02-06 PROBLEM — T14.91XA SUICIDE ATTEMPT (HCC): Status: ACTIVE | Noted: 2024-02-06

## 2024-02-06 PROBLEM — F31.9 BIPOLAR AFFECTIVE DISORDER, CURRENTLY ACTIVE (HCC): Status: ACTIVE | Noted: 2024-02-06

## 2024-02-06 PROBLEM — E87.6 HYPOKALEMIA: Status: ACTIVE | Noted: 2024-02-06

## 2024-02-06 LAB
ALBUMIN SERPL-MCNC: 4.5 G/DL (ref 3.5–5)
ALBUMIN/GLOB SERPL: 1.2 (ref 1.1–2.2)
ALP SERPL-CCNC: 67 U/L (ref 45–117)
ALT SERPL-CCNC: 24 U/L (ref 12–78)
ANION GAP SERPL CALC-SCNC: 8 MMOL/L (ref 5–15)
APAP SERPL-MCNC: <2 UG/ML (ref 10–30)
AST SERPL-CCNC: 15 U/L (ref 15–37)
BASOPHILS # BLD: 0.1 K/UL (ref 0–0.1)
BASOPHILS NFR BLD: 1 % (ref 0–1)
BILIRUB SERPL-MCNC: 0.4 MG/DL (ref 0.2–1)
BUN SERPL-MCNC: 13 MG/DL (ref 6–20)
BUN/CREAT SERPL: 13 (ref 12–20)
CALCIUM SERPL-MCNC: 10.2 MG/DL (ref 8.5–10.1)
CHLORIDE SERPL-SCNC: 106 MMOL/L (ref 97–108)
CO2 SERPL-SCNC: 27 MMOL/L (ref 21–32)
CREAT SERPL-MCNC: 0.97 MG/DL (ref 0.55–1.02)
DIFFERENTIAL METHOD BLD: ABNORMAL
EOSINOPHIL # BLD: 0.1 K/UL (ref 0–0.4)
EOSINOPHIL NFR BLD: 2 % (ref 0–7)
ERYTHROCYTE [DISTWIDTH] IN BLOOD BY AUTOMATED COUNT: 11.8 % (ref 11.5–14.5)
ETHANOL SERPL-MCNC: 56 MG/DL (ref 0–0.08)
GLOBULIN SER CALC-MCNC: 3.8 G/DL (ref 2–4)
GLUCOSE SERPL-MCNC: 101 MG/DL (ref 65–100)
HCG SERPL QL: NEGATIVE
HCT VFR BLD AUTO: 44.7 % (ref 35–47)
HGB BLD-MCNC: 15.5 G/DL (ref 11.5–16)
IMM GRANULOCYTES # BLD AUTO: 0 K/UL (ref 0–0.04)
IMM GRANULOCYTES NFR BLD AUTO: 0 % (ref 0–0.5)
LYMPHOCYTES # BLD: 4 K/UL (ref 0.8–3.5)
LYMPHOCYTES NFR BLD: 46 % (ref 12–49)
MAGNESIUM SERPL-MCNC: 1.9 MG/DL (ref 1.6–2.4)
MCH RBC QN AUTO: 33.1 PG (ref 26–34)
MCHC RBC AUTO-ENTMCNC: 34.7 G/DL (ref 30–36.5)
MCV RBC AUTO: 95.5 FL (ref 80–99)
MONOCYTES # BLD: 0.5 K/UL (ref 0–1)
MONOCYTES NFR BLD: 6 % (ref 5–13)
NEUTS SEG # BLD: 4 K/UL (ref 1.8–8)
NEUTS SEG NFR BLD: 45 % (ref 32–75)
NRBC # BLD: 0 K/UL (ref 0–0.01)
NRBC BLD-RTO: 0 PER 100 WBC
PLATELET # BLD AUTO: 300 K/UL (ref 150–400)
PMV BLD AUTO: 11.3 FL (ref 8.9–12.9)
POTASSIUM SERPL-SCNC: 3.1 MMOL/L (ref 3.5–5.1)
PROT SERPL-MCNC: 8.3 G/DL (ref 6.4–8.2)
RBC # BLD AUTO: 4.68 M/UL (ref 3.8–5.2)
SALICYLATES SERPL-MCNC: <1.7 MG/DL (ref 2.8–20)
SODIUM SERPL-SCNC: 141 MMOL/L (ref 136–145)
WBC # BLD AUTO: 8.7 K/UL (ref 3.6–11)

## 2024-02-06 PROCEDURE — 36415 COLL VENOUS BLD VENIPUNCTURE: CPT

## 2024-02-06 PROCEDURE — 83735 ASSAY OF MAGNESIUM: CPT

## 2024-02-06 PROCEDURE — 6370000000 HC RX 637 (ALT 250 FOR IP): Performed by: STUDENT IN AN ORGANIZED HEALTH CARE EDUCATION/TRAINING PROGRAM

## 2024-02-06 PROCEDURE — 85025 COMPLETE CBC W/AUTO DIFF WBC: CPT

## 2024-02-06 PROCEDURE — 80053 COMPREHEN METABOLIC PANEL: CPT

## 2024-02-06 PROCEDURE — 6370000000 HC RX 637 (ALT 250 FOR IP): Performed by: INTERNAL MEDICINE

## 2024-02-06 PROCEDURE — 84703 CHORIONIC GONADOTROPIN ASSAY: CPT

## 2024-02-06 PROCEDURE — 82077 ASSAY SPEC XCP UR&BREATH IA: CPT

## 2024-02-06 PROCEDURE — 96374 THER/PROPH/DIAG INJ IV PUSH: CPT

## 2024-02-06 PROCEDURE — 2580000003 HC RX 258: Performed by: INTERNAL MEDICINE

## 2024-02-06 PROCEDURE — 93005 ELECTROCARDIOGRAM TRACING: CPT | Performed by: STUDENT IN AN ORGANIZED HEALTH CARE EDUCATION/TRAINING PROGRAM

## 2024-02-06 PROCEDURE — 80143 DRUG ASSAY ACETAMINOPHEN: CPT

## 2024-02-06 PROCEDURE — 6360000002 HC RX W HCPCS: Performed by: STUDENT IN AN ORGANIZED HEALTH CARE EDUCATION/TRAINING PROGRAM

## 2024-02-06 PROCEDURE — 90791 PSYCH DIAGNOSTIC EVALUATION: CPT

## 2024-02-06 PROCEDURE — 2060000000 HC ICU INTERMEDIATE R&B

## 2024-02-06 PROCEDURE — 99285 EMERGENCY DEPT VISIT HI MDM: CPT

## 2024-02-06 PROCEDURE — 80179 DRUG ASSAY SALICYLATE: CPT

## 2024-02-06 PROCEDURE — 2580000003 HC RX 258: Performed by: STUDENT IN AN ORGANIZED HEALTH CARE EDUCATION/TRAINING PROGRAM

## 2024-02-06 RX ORDER — ACETAMINOPHEN 325 MG/1
650 TABLET ORAL EVERY 6 HOURS PRN
Status: DISCONTINUED | OUTPATIENT
Start: 2024-02-06 | End: 2024-02-10 | Stop reason: HOSPADM

## 2024-02-06 RX ORDER — ENOXAPARIN SODIUM 100 MG/ML
40 INJECTION SUBCUTANEOUS DAILY
Status: DISCONTINUED | OUTPATIENT
Start: 2024-02-07 | End: 2024-02-10 | Stop reason: HOSPADM

## 2024-02-06 RX ORDER — SODIUM CHLORIDE 9 MG/ML
INJECTION, SOLUTION INTRAVENOUS PRN
Status: DISCONTINUED | OUTPATIENT
Start: 2024-02-06 | End: 2024-02-10 | Stop reason: HOSPADM

## 2024-02-06 RX ORDER — ACETAMINOPHEN 650 MG/1
650 SUPPOSITORY RECTAL EVERY 6 HOURS PRN
Status: DISCONTINUED | OUTPATIENT
Start: 2024-02-06 | End: 2024-02-10 | Stop reason: HOSPADM

## 2024-02-06 RX ORDER — SODIUM CHLORIDE 9 MG/ML
INJECTION, SOLUTION INTRAVENOUS CONTINUOUS
Status: DISPENSED | OUTPATIENT
Start: 2024-02-06 | End: 2024-02-08

## 2024-02-06 RX ORDER — POTASSIUM CHLORIDE 7.45 MG/ML
10 INJECTION INTRAVENOUS PRN
Status: DISCONTINUED | OUTPATIENT
Start: 2024-02-06 | End: 2024-02-10 | Stop reason: HOSPADM

## 2024-02-06 RX ORDER — POLYETHYLENE GLYCOL 3350 17 G/17G
17 POWDER, FOR SOLUTION ORAL DAILY PRN
Status: DISCONTINUED | OUTPATIENT
Start: 2024-02-06 | End: 2024-02-10 | Stop reason: HOSPADM

## 2024-02-06 RX ORDER — ONDANSETRON 4 MG/1
4 TABLET, ORALLY DISINTEGRATING ORAL EVERY 8 HOURS PRN
Status: DISCONTINUED | OUTPATIENT
Start: 2024-02-06 | End: 2024-02-10 | Stop reason: HOSPADM

## 2024-02-06 RX ORDER — ONDANSETRON 2 MG/ML
4 INJECTION INTRAMUSCULAR; INTRAVENOUS ONCE
Status: COMPLETED | OUTPATIENT
Start: 2024-02-06 | End: 2024-02-06

## 2024-02-06 RX ORDER — 0.9 % SODIUM CHLORIDE 0.9 %
1000 INTRAVENOUS SOLUTION INTRAVENOUS ONCE
Status: COMPLETED | OUTPATIENT
Start: 2024-02-06 | End: 2024-02-06

## 2024-02-06 RX ORDER — SODIUM CHLORIDE 0.9 % (FLUSH) 0.9 %
5-40 SYRINGE (ML) INJECTION EVERY 12 HOURS SCHEDULED
Status: DISCONTINUED | OUTPATIENT
Start: 2024-02-06 | End: 2024-02-10 | Stop reason: HOSPADM

## 2024-02-06 RX ORDER — SODIUM CHLORIDE 0.9 % (FLUSH) 0.9 %
5-40 SYRINGE (ML) INJECTION PRN
Status: DISCONTINUED | OUTPATIENT
Start: 2024-02-06 | End: 2024-02-10 | Stop reason: HOSPADM

## 2024-02-06 RX ORDER — ONDANSETRON 2 MG/ML
4 INJECTION INTRAMUSCULAR; INTRAVENOUS EVERY 6 HOURS PRN
Status: DISCONTINUED | OUTPATIENT
Start: 2024-02-06 | End: 2024-02-10 | Stop reason: HOSPADM

## 2024-02-06 RX ORDER — POTASSIUM CHLORIDE 750 MG/1
40 TABLET, FILM COATED, EXTENDED RELEASE ORAL PRN
Status: DISCONTINUED | OUTPATIENT
Start: 2024-02-06 | End: 2024-02-10 | Stop reason: HOSPADM

## 2024-02-06 RX ORDER — ENOXAPARIN SODIUM 100 MG/ML
40 INJECTION SUBCUTANEOUS DAILY
Status: DISCONTINUED | OUTPATIENT
Start: 2024-02-07 | End: 2024-02-06

## 2024-02-06 RX ORDER — MAGNESIUM SULFATE IN WATER 40 MG/ML
2000 INJECTION, SOLUTION INTRAVENOUS PRN
Status: DISCONTINUED | OUTPATIENT
Start: 2024-02-06 | End: 2024-02-10 | Stop reason: HOSPADM

## 2024-02-06 RX ORDER — POTASSIUM CHLORIDE 750 MG/1
40 TABLET, FILM COATED, EXTENDED RELEASE ORAL ONCE
Status: COMPLETED | OUTPATIENT
Start: 2024-02-06 | End: 2024-02-06

## 2024-02-06 RX ADMIN — SODIUM CHLORIDE, PRESERVATIVE FREE 10 ML: 5 INJECTION INTRAVENOUS at 23:07

## 2024-02-06 RX ADMIN — ONDANSETRON 4 MG: 2 INJECTION INTRAMUSCULAR; INTRAVENOUS at 20:21

## 2024-02-06 RX ADMIN — ACETAMINOPHEN 650 MG: 325 TABLET ORAL at 23:04

## 2024-02-06 RX ADMIN — SODIUM CHLORIDE 1000 ML: 9 INJECTION, SOLUTION INTRAVENOUS at 20:28

## 2024-02-06 RX ADMIN — SODIUM CHLORIDE: 9 INJECTION, SOLUTION INTRAVENOUS at 23:07

## 2024-02-06 RX ADMIN — POTASSIUM CHLORIDE 40 MEQ: 750 TABLET, FILM COATED, EXTENDED RELEASE ORAL at 20:45

## 2024-02-06 ASSESSMENT — PAIN - FUNCTIONAL ASSESSMENT: PAIN_FUNCTIONAL_ASSESSMENT: NONE - DENIES PAIN

## 2024-02-06 NOTE — ED PROVIDER NOTES
[facility] EMERGENCY DEPT  EMERGENCY DEPARTMENT ENCOUNTER      Pt Name: Deepti Ayala  MRN: [id number]  Birthdate 1975  Date of evaluation: 2/6/2024  Provider: Kirstin Dubois DO    CHIEF COMPLAINT       Chief Complaint   Patient presents with    Drug Overdose         HISTORY OF PRESENT ILLNESS    HPI    Deepti Ayala is a 48 y.o. female who presents to the emergency department for intentional drug overdose.  Patient is drowsy and unable to provide any history herself.  Per patient's boyfriend at bedside, he got home around 530 this evening and patient stated she was upset due to issues at work.  She then told him that prior to him getting home she \"took all her pills.\"  Boyfriend is unsure what medications patient takes, states they are in a pill dispenser and she reportedly took the entire box.    Nursing Notes were reviewed.    REVIEW OF SYSTEMS       Review of Systems   Unable to perform ROS: Mental status change           PAST MEDICAL HISTORY   No past medical history on file.      SURGICAL HISTORY       Past Surgical History:   Procedure Laterality Date    BREAST BIOPSY Right 2019    neg    US BREAST BIOPSY W LOC DEVICE 1ST LESION RIGHT Right 1/18/2019    US BREAST NEEDLE BIOPSY RIGHT 1/18/2019 [facility]         CURRENT MEDICATIONS       Previous Medications    ALPRAZOLAM (XANAX) 1 MG TABLET    Take by mouth.    CYCLOBENZAPRINE (FLEXERIL) 10 MG TABLET    Take 10 mg by mouth 3 times daily as needed    LAMOTRIGINE (LAMICTAL) 200 MG TABLET    Take 200 mg by mouth daily    METFORMIN (GLUCOPHAGE) 1000 MG TABLET    Take 2,000 mg by mouth 2 times daily (with meals)    METHYLPREDNISOLONE (MEDROL DOSEPACK) 4 MG TABLET    Take as directed with food    NABUMETONE (RELAFEN) 750 MG TABLET    Take 750 mg by mouth 2 times daily    NALOXONE 4 MG/0.1ML LIQD NASAL SPRAY    Use 1 spray intranasally, then discard. Repeat with new spray every 2 min as needed for opioid overdose symptoms, alternating

## 2024-02-06 NOTE — ED TRIAGE NOTES
Pt. Brought in by boyfriend for overdose, a bunch of pills unknown what, unsure of what time pills were taken. Boyfriend got home around 1730. Responded to vigorous sternal rub.

## 2024-02-07 ENCOUNTER — APPOINTMENT (OUTPATIENT)
Facility: HOSPITAL | Age: 49
End: 2024-02-07
Payer: COMMERCIAL

## 2024-02-07 LAB
ALBUMIN SERPL-MCNC: 3.5 G/DL (ref 3.5–5)
ALBUMIN/GLOB SERPL: 1.1 (ref 1.1–2.2)
ALP SERPL-CCNC: 53 U/L (ref 45–117)
ALT SERPL-CCNC: 19 U/L (ref 12–78)
AMPHET UR QL SCN: POSITIVE
ANION GAP SERPL CALC-SCNC: 4 MMOL/L (ref 5–15)
AST SERPL-CCNC: 15 U/L (ref 15–37)
BARBITURATES UR QL SCN: NEGATIVE
BASOPHILS # BLD: 0 K/UL (ref 0–0.1)
BASOPHILS NFR BLD: 0 % (ref 0–1)
BENZODIAZ UR QL: NEGATIVE
BILIRUB SERPL-MCNC: 0.4 MG/DL (ref 0.2–1)
BUN SERPL-MCNC: 13 MG/DL (ref 6–20)
BUN/CREAT SERPL: 15 (ref 12–20)
CALCIUM SERPL-MCNC: 8.8 MG/DL (ref 8.5–10.1)
CANNABINOIDS UR QL SCN: NEGATIVE
CHLORIDE SERPL-SCNC: 109 MMOL/L (ref 97–108)
CO2 SERPL-SCNC: 25 MMOL/L (ref 21–32)
COCAINE UR QL SCN: NEGATIVE
CREAT SERPL-MCNC: 0.88 MG/DL (ref 0.55–1.02)
DIFFERENTIAL METHOD BLD: ABNORMAL
EKG ATRIAL RATE: 107 BPM
EKG ATRIAL RATE: 78 BPM
EKG DIAGNOSIS: NORMAL
EKG DIAGNOSIS: NORMAL
EKG P AXIS: 51 DEGREES
EKG P AXIS: 65 DEGREES
EKG P-R INTERVAL: 150 MS
EKG P-R INTERVAL: 166 MS
EKG Q-T INTERVAL: 324 MS
EKG Q-T INTERVAL: 392 MS
EKG QRS DURATION: 82 MS
EKG QRS DURATION: 92 MS
EKG QTC CALCULATION (BAZETT): 432 MS
EKG QTC CALCULATION (BAZETT): 446 MS
EKG R AXIS: 13 DEGREES
EKG R AXIS: 5 DEGREES
EKG T AXIS: 48 DEGREES
EKG T AXIS: 53 DEGREES
EKG VENTRICULAR RATE: 107 BPM
EKG VENTRICULAR RATE: 78 BPM
EOSINOPHIL # BLD: 0 K/UL (ref 0–0.4)
EOSINOPHIL NFR BLD: 0 % (ref 0–7)
ERYTHROCYTE [DISTWIDTH] IN BLOOD BY AUTOMATED COUNT: 11.8 % (ref 11.5–14.5)
GLOBULIN SER CALC-MCNC: 3.1 G/DL (ref 2–4)
GLUCOSE SERPL-MCNC: 106 MG/DL (ref 65–100)
HCT VFR BLD AUTO: 37.2 % (ref 35–47)
HGB BLD-MCNC: 12.8 G/DL (ref 11.5–16)
IMM GRANULOCYTES # BLD AUTO: 0.1 K/UL (ref 0–0.04)
IMM GRANULOCYTES NFR BLD AUTO: 0 % (ref 0–0.5)
LYMPHOCYTES # BLD: 1.2 K/UL (ref 0.8–3.5)
LYMPHOCYTES NFR BLD: 9 % (ref 12–49)
Lab: ABNORMAL
MCH RBC QN AUTO: 33.5 PG (ref 26–34)
MCHC RBC AUTO-ENTMCNC: 34.4 G/DL (ref 30–36.5)
MCV RBC AUTO: 97.4 FL (ref 80–99)
METHADONE UR QL: NEGATIVE
MONOCYTES # BLD: 0.5 K/UL (ref 0–1)
MONOCYTES NFR BLD: 4 % (ref 5–13)
NEUTS SEG # BLD: 11.2 K/UL (ref 1.8–8)
NEUTS SEG NFR BLD: 86 % (ref 32–75)
NRBC # BLD: 0 K/UL (ref 0–0.01)
NRBC BLD-RTO: 0 PER 100 WBC
OPIATES UR QL: NEGATIVE
PCP UR QL: NEGATIVE
PLATELET # BLD AUTO: 226 K/UL (ref 150–400)
PMV BLD AUTO: 11 FL (ref 8.9–12.9)
POTASSIUM SERPL-SCNC: 4.2 MMOL/L (ref 3.5–5.1)
PROT SERPL-MCNC: 6.6 G/DL (ref 6.4–8.2)
RBC # BLD AUTO: 3.82 M/UL (ref 3.8–5.2)
SODIUM SERPL-SCNC: 138 MMOL/L (ref 136–145)
SPECIMEN HOLD: NORMAL
WBC # BLD AUTO: 13 K/UL (ref 3.6–11)

## 2024-02-07 PROCEDURE — 2580000003 HC RX 258: Performed by: INTERNAL MEDICINE

## 2024-02-07 PROCEDURE — 80053 COMPREHEN METABOLIC PANEL: CPT

## 2024-02-07 PROCEDURE — 6370000000 HC RX 637 (ALT 250 FOR IP): Performed by: INTERNAL MEDICINE

## 2024-02-07 PROCEDURE — 80307 DRUG TEST PRSMV CHEM ANLYZR: CPT

## 2024-02-07 PROCEDURE — 94761 N-INVAS EAR/PLS OXIMETRY MLT: CPT

## 2024-02-07 PROCEDURE — 6360000002 HC RX W HCPCS: Performed by: INTERNAL MEDICINE

## 2024-02-07 PROCEDURE — 93005 ELECTROCARDIOGRAM TRACING: CPT | Performed by: INTERNAL MEDICINE

## 2024-02-07 PROCEDURE — 93010 ELECTROCARDIOGRAM REPORT: CPT | Performed by: INTERNAL MEDICINE

## 2024-02-07 PROCEDURE — 74176 CT ABD & PELVIS W/O CONTRAST: CPT

## 2024-02-07 PROCEDURE — 2060000000 HC ICU INTERMEDIATE R&B

## 2024-02-07 PROCEDURE — 85025 COMPLETE CBC W/AUTO DIFF WBC: CPT

## 2024-02-07 PROCEDURE — 36415 COLL VENOUS BLD VENIPUNCTURE: CPT

## 2024-02-07 RX ORDER — PHENOBARBITAL SODIUM 65 MG/ML
16.2 INJECTION INTRAMUSCULAR EVERY 6 HOURS PRN
Status: DISCONTINUED | OUTPATIENT
Start: 2024-02-10 | End: 2024-02-07

## 2024-02-07 RX ORDER — PHENOBARBITAL 32.4 MG/1
16.2 TABLET ORAL EVERY 6 HOURS PRN
Status: DISCONTINUED | OUTPATIENT
Start: 2024-02-10 | End: 2024-02-10 | Stop reason: HOSPADM

## 2024-02-07 RX ORDER — GAUZE BANDAGE 2" X 2"
100 BANDAGE TOPICAL DAILY
Status: DISCONTINUED | OUTPATIENT
Start: 2024-02-07 | End: 2024-02-10 | Stop reason: HOSPADM

## 2024-02-07 RX ORDER — MULTIVITAMIN WITH IRON
1 TABLET ORAL DAILY
Status: DISCONTINUED | OUTPATIENT
Start: 2024-02-07 | End: 2024-02-10 | Stop reason: HOSPADM

## 2024-02-07 RX ORDER — PHENOBARBITAL 32.4 MG/1
32.4 TABLET ORAL EVERY 6 HOURS PRN
Status: DISCONTINUED | OUTPATIENT
Start: 2024-02-08 | End: 2024-02-10 | Stop reason: HOSPADM

## 2024-02-07 RX ORDER — PHENOBARBITAL 32.4 MG/1
32.4 TABLET ORAL 2 TIMES DAILY
Status: COMPLETED | OUTPATIENT
Start: 2024-02-09 | End: 2024-02-09

## 2024-02-07 RX ORDER — PHENOBARBITAL SODIUM 65 MG/ML
16.2 INJECTION INTRAMUSCULAR 2 TIMES DAILY
Status: DISCONTINUED | OUTPATIENT
Start: 2024-02-10 | End: 2024-02-07

## 2024-02-07 RX ORDER — PHENOBARBITAL SODIUM 65 MG/ML
32.5 INJECTION INTRAMUSCULAR EVERY 6 HOURS PRN
Status: DISCONTINUED | OUTPATIENT
Start: 2024-02-08 | End: 2024-02-07

## 2024-02-07 RX ORDER — PHENOBARBITAL 32.4 MG/1
32.4 TABLET ORAL 4 TIMES DAILY
Status: COMPLETED | OUTPATIENT
Start: 2024-02-08 | End: 2024-02-08

## 2024-02-07 RX ORDER — PHENOBARBITAL 32.4 MG/1
64.8 TABLET ORAL 4 TIMES DAILY
Status: COMPLETED | OUTPATIENT
Start: 2024-02-07 | End: 2024-02-07

## 2024-02-07 RX ORDER — FOLIC ACID 1 MG/1
1 TABLET ORAL DAILY
Status: DISCONTINUED | OUTPATIENT
Start: 2024-02-07 | End: 2024-02-10 | Stop reason: HOSPADM

## 2024-02-07 RX ORDER — PHENOBARBITAL SODIUM 65 MG/ML
32.5 INJECTION INTRAMUSCULAR 4 TIMES DAILY
Status: DISCONTINUED | OUTPATIENT
Start: 2024-02-08 | End: 2024-02-07

## 2024-02-07 RX ORDER — PHENOBARBITAL 32.4 MG/1
16.2 TABLET ORAL 2 TIMES DAILY
Status: DISCONTINUED | OUTPATIENT
Start: 2024-02-10 | End: 2024-02-10 | Stop reason: HOSPADM

## 2024-02-07 RX ORDER — PHENOBARBITAL 32.4 MG/1
64.8 TABLET ORAL EVERY 6 HOURS PRN
Status: ACTIVE | OUTPATIENT
Start: 2024-02-07 | End: 2024-02-08

## 2024-02-07 RX ORDER — PHENOBARBITAL SODIUM 65 MG/ML
65 INJECTION INTRAMUSCULAR 4 TIMES DAILY
Status: DISCONTINUED | OUTPATIENT
Start: 2024-02-07 | End: 2024-02-07

## 2024-02-07 RX ORDER — PHENOBARBITAL SODIUM 65 MG/ML
65 INJECTION INTRAMUSCULAR EVERY 6 HOURS PRN
Status: DISCONTINUED | OUTPATIENT
Start: 2024-02-07 | End: 2024-02-07

## 2024-02-07 RX ORDER — PHENOBARBITAL SODIUM 65 MG/ML
32.5 INJECTION INTRAMUSCULAR 2 TIMES DAILY
Status: DISCONTINUED | OUTPATIENT
Start: 2024-02-09 | End: 2024-02-07

## 2024-02-07 RX ADMIN — PHENOBARBITAL 64.8 MG: 32.4 TABLET ORAL at 10:22

## 2024-02-07 RX ADMIN — SODIUM CHLORIDE: 9 INJECTION, SOLUTION INTRAVENOUS at 13:01

## 2024-02-07 RX ADMIN — ONDANSETRON 4 MG: 2 INJECTION INTRAMUSCULAR; INTRAVENOUS at 08:01

## 2024-02-07 RX ADMIN — THIAMINE HCL TAB 100 MG 100 MG: 100 TAB at 10:23

## 2024-02-07 RX ADMIN — ACETAMINOPHEN 650 MG: 325 TABLET ORAL at 13:00

## 2024-02-07 RX ADMIN — THERA TABS 1 TABLET: TAB at 10:23

## 2024-02-07 RX ADMIN — PHENOBARBITAL 64.8 MG: 32.4 TABLET ORAL at 13:00

## 2024-02-07 RX ADMIN — PHENOBARBITAL 64.8 MG: 32.4 TABLET ORAL at 17:11

## 2024-02-07 RX ADMIN — PHENOBARBITAL 64.8 MG: 32.4 TABLET ORAL at 21:01

## 2024-02-07 RX ADMIN — SODIUM CHLORIDE: 9 INJECTION, SOLUTION INTRAVENOUS at 04:21

## 2024-02-07 RX ADMIN — SODIUM CHLORIDE: 9 INJECTION, SOLUTION INTRAVENOUS at 21:16

## 2024-02-07 RX ADMIN — ACETAMINOPHEN 650 MG: 325 TABLET ORAL at 06:44

## 2024-02-07 RX ADMIN — SODIUM CHLORIDE, PRESERVATIVE FREE 10 ML: 5 INJECTION INTRAVENOUS at 21:02

## 2024-02-07 RX ADMIN — FOLIC ACID 1 MG: 1 TABLET ORAL at 10:23

## 2024-02-07 RX ADMIN — SODIUM CHLORIDE, PRESERVATIVE FREE 10 ML: 5 INJECTION INTRAVENOUS at 08:05

## 2024-02-07 RX ADMIN — ENOXAPARIN SODIUM 40 MG: 100 INJECTION SUBCUTANEOUS at 08:01

## 2024-02-07 ASSESSMENT — PAIN SCALES - GENERAL
PAINLEVEL_OUTOF10: 3
PAINLEVEL_OUTOF10: 6
PAINLEVEL_OUTOF10: 0

## 2024-02-07 ASSESSMENT — PAIN DESCRIPTION - LOCATION: LOCATION: HEAD

## 2024-02-07 ASSESSMENT — PAIN DESCRIPTION - ORIENTATION: ORIENTATION: ANTERIOR

## 2024-02-07 ASSESSMENT — PAIN DESCRIPTION - DESCRIPTORS: DESCRIPTORS: PRESSURE

## 2024-02-07 NOTE — PROGRESS NOTES
0700: Bedside and Verbal shift change report given to Kate RN (oncoming nurse) by Elizabeth RN (offgoing nurse). Report included the following information Nurse Handoff Report, Index, Adult Overview, Intake/Output, MAR, Recent Results, Cardiac Rhythm NSR, and Neuro Assessment.     1409: Spoke w/  in poison control. Provided update on vital signs, EKG strip, and MAR. Still under observation protocol through poison control until at least 2100 this evening.    1900: Bedside and Verbal shift change report given to Tu WISEMAN (oncoming nurse) by Kate RN (offgoing nurse). Report included the following information Nurse Handoff Report, Index, Adult Overview, Intake/Output, MAR, Recent Results, Cardiac Rhythm NSR, and Neuro Assessment.

## 2024-02-07 NOTE — CARE COORDINATION
Care Management Initial Assessment Note:     Patient with low readmission risk score of 7%. No identified CM needs. Please consult CM for any discharge planning needs that may arise.     ______________________  Pooja RUFF, RN  Care Management  2/7/2024  4:06 PM

## 2024-02-07 NOTE — H&P
mg by mouth 3 times daily as needed 3/10/20   Automatic Reconciliation, Ar   lamoTRIgine (LAMICTAL) 200 MG tablet Take 200 mg by mouth daily    Automatic Reconciliation, Ar   metFORMIN (GLUCOPHAGE) 1000 MG tablet Take 2,000 mg by mouth 2 times daily (with meals)    Automatic Reconciliation, Ar   methylPREDNISolone (MEDROL DOSEPACK) 4 MG tablet Take as directed with food 3/10/20   Automatic Reconciliation, Ar   nabumetone (RELAFEN) 750 MG tablet Take 750 mg by mouth 2 times daily    Automatic Reconciliation, Ar   naloxone 4 MG/0.1ML LIQD nasal spray Use 1 spray intranasally, then discard. Repeat with new spray every 2 min as needed for opioid overdose symptoms, alternating nostrils. 3/10/20   Automatic Reconciliation, Ar   sertraline (ZOLOFT) 100 MG tablet Take 200 mg by mouth daily    Automatic Reconciliation, Ar   valACYclovir (VALTREX) 500 MG tablet Take 500 mg by mouth 2 times daily    Automatic Reconciliation, Ar         Review of Systems:  (bold if positive, if negative)    Gen:  Eyes:  ENT:  CVS:  Pulm:  GI:  :  MS:  Skin:  Psych:  Endo:  Hem:  Renal:  Neuro:            Objective:      VITALS:    Vital signs reviewed; most recent are:    Vitals:    02/06/24 1836   BP:    Pulse: (!) 114   Resp:    Temp:    SpO2:      SpO2 Readings from Last 6 Encounters:   02/06/24 97%        No intake or output data in the 24 hours ending 02/06/24 2147         Exam:     Physical Exam:    Gen:  Well-developed, well-nourished, in no acute distress  HEENT:  Pink conjunctivae, PERRL, hearing intact to voice, moist mucous membranes  Neck:  Supple, without masses, thyroid non-tender  Resp:  No accessory muscle use, clear breath sounds without wheezes rales or rhonchi  Card:  No murmurs, normal S1, S2 without thrills, bruits or peripheral edema  Abd:  Soft, non-tender, non-distended, normoactive bowel sounds are present, no palpable organomegaly and no detectable hernias  Lymph:  No cervical or inguinal adenopathy  Musc:  No  cyanosis or clubbing  Skin:  No rashes or ulcers, skin turgor is good  Neuro:  Cranial nerves are grossly intact, no focal motor weakness, follows commands appropriately  Psych:  Good insight, oriented to person, place and time, alert       Labs:    Recent Labs     02/06/24  1901   WBC 8.7   HGB 15.5   HCT 44.7        Recent Labs     02/06/24  1901      K 3.1*      CO2 27   BUN 13   MG 1.9   ALT 24     No results found for: \"GLUCPOC\"  No results for input(s): \"PH\", \"PCO2\", \"PO2\", \"HCO3\", \"FIO2\" in the last 72 hours.  No results for input(s): \"INR\" in the last 72 hours.    Telemetry reviewed:          Assessment/Plan:     Drug overdose, intentional self-harm, subsequent encounter ([facility]).  According to the patient, her suicidal attempt is the third time now.  She admitted to taking Wellbutrin, Lamictal, Klonopin.  Admit to telemetry.  Repeat EKG in AM.     2.  Suicide attempt ([facility]).  Suicidal precaution.  Consult psychiatry      3.  Hypokalemia.  Replete      4.  Bipolar affective disorder, currently active ([facility]).  Hold her home medications for now.  Consult psychiatry         Previous medical records reviewed     Risk of deterioration: high      Total time spent with patient care Total time: 75 minutes. I personally saw and examined the patient during this time period.  Greater than 50% of this time was spent in counseling and coordination of care. minutes    I personally reviewed chart, notes, data and current medications in the medical record.  I have personally examined and treated the patient at bedside during this period.                 Care Plan discussed with: Patient, Nursing Staff, and >50% of time spent in counseling and coordination of care    Discussed:  Care Plan    Prophylaxis:  Lovenox    Probable Disposition: Inpatient psychiatry           ___________________________________________________    Attending Physician: Alek Maldonado MD

## 2024-02-07 NOTE — BSMART NOTE
Patient is a medical admission. Nurse will ask provider to cancel Bsmart consult and order a psych consult per protocol.

## 2024-02-07 NOTE — PROGRESS NOTES
VASU LINDSAY [facility]  [address] [place] [address] [phone]      Medical Progress Note      NAME: Deepti Ayala   :  1975  MRM:  [id number]    Date/Time of service: 2024         Subjective:     Chief Complaint:  Patient was personally seen and examined by me during this time period.  Chart reviewed. Fu overdose. Denies any current thought of wanting to hurt herself or anyone else. Endorses some nausea, no current vomiting. Denies any current tremors. Called to provide  update but no answer; left vm        Objective:       Vitals:       Last 24hrs VS reviewed since prior progress note. Most recent are:    Vitals:    24 0714   BP: 125/78   Pulse: 79   Resp: 18   Temp: 98.4 °F (36.9 °C)   SpO2: 95%     SpO2 Readings from Last 6 Encounters:   24 95%          Intake/Output Summary (Last 24 hours) at 2024 0915  Last data filed at 2024 2159  Gross per 24 hour   Intake 1000 ml   Output --   Net 1000 ml        Exam:     Physical Exam:    Gen:  Well-developed, well-nourished, in no acute distress  HEENT:  Pink conjunctivae, PERRL, hearing intact to voice  Resp:  No accessory muscle use, clear breath sounds without wheezes rales or rhonchi  Card:  RRR, No murmurs, normal S1, S2, no peripheral edema  Abd:  Soft, non-tender, non-distended, normoactive bowel sounds are present  Musc:  No cyanosis or clubbing  Skin:  No rashes or ulcers, skin turgor is good  Neuro:  Cranial nerves 3-12 are grossly intact, follows commands appropriately  Psych:  Oriented to person, place, and time, Alert with good insight      Medications Reviewed: (see below)    Lab Data Reviewed: (see below)    ______________________________________________________________________    Medications:     Current Facility-Administered Medications   Medication Dose Route Frequency    PHENobarbital tablet 64.8 mg  64.8 mg Oral 4x daily    Followed by    [START ON 2024]  PHENobarbital tablet 32.4 mg  32.4 mg Oral 4x daily    Followed by    [START ON 2/9/2024] PHENobarbital tablet 32.4 mg  32.4 mg Oral BID    Followed by    [START ON 2/10/2024] PHENobarbital tablet 16.2 mg  16.2 mg Oral BID    PHENobarbital tablet 64.8 mg  64.8 mg Oral Q6H PRN    Followed by    [START ON 2/8/2024] PHENobarbital tablet 32.4 mg  32.4 mg Oral Q6H PRN    Followed by    [START ON 2/10/2024] PHENobarbital tablet 16.2 mg  16.2 mg Oral Q6H PRN    folic acid (FOLVITE) tablet 1 mg  1 mg Oral Daily    thiamine mononitrate tablet 100 mg  100 mg Oral Daily    multivitamin 1 tablet  1 tablet Oral Daily    sodium chloride flush 0.9 % injection 5-40 mL  5-40 mL IntraVENous 2 times per day    sodium chloride flush 0.9 % injection 5-40 mL  5-40 mL IntraVENous PRN    0.9 % sodium chloride infusion   IntraVENous PRN    potassium chloride (KLOR-CON) extended release tablet 40 mEq  40 mEq Oral PRN    Or    potassium bicarb-citric acid (EFFER-K) effervescent tablet 40 mEq  40 mEq Oral PRN    Or    potassium chloride 10 mEq/100 mL IVPB (Peripheral Line)  10 mEq IntraVENous PRN    magnesium sulfate 2000 mg in 50 mL IVPB premix  2,000 mg IntraVENous PRN    ondansetron (ZOFRAN-ODT) disintegrating tablet 4 mg  4 mg Oral Q8H PRN    Or    ondansetron (ZOFRAN) injection 4 mg  4 mg IntraVENous Q6H PRN    polyethylene glycol (GLYCOLAX) packet 17 g  17 g Oral Daily PRN    acetaminophen (TYLENOL) tablet 650 mg  650 mg Oral Q6H PRN    Or    acetaminophen (TYLENOL) suppository 650 mg  650 mg Rectal Q6H PRN    0.9 % sodium chloride infusion   IntraVENous Continuous    enoxaparin (LOVENOX) injection 40 mg  40 mg SubCUTAneous Daily          Lab Review:     Recent Labs     02/06/24  1901 02/07/24  0431   WBC 8.7 13.0*   HGB 15.5 12.8   HCT 44.7 37.2    226     Recent Labs     02/06/24  1901 02/07/24  0431    138   K 3.1* 4.2    109*   CO2 27 25   BUN 13 13   MG 1.9  --    ALT 24 19     No results found for: \"GLUCPOC\"

## 2024-02-07 NOTE — ED NOTES
Updated Poison control, they state to continue symptomatic care. Continue to monitor for increased risk of seizures

## 2024-02-07 NOTE — BSMART NOTE
Initial BSMART Liaison Assessment Form     Section I - Integrated Summary    Chief Complaint: Intentional OD: Wellbutrin, Lamictal, Klonapin.    LOS:  1     Psychiatric Consult: suicide attempt    Presenting problem/Summary:  UDS (+) amphetamines   BAL: 56  Per triage, pt brought in by boyfriend for overdose, a bunch of pills unknown what, unsure of what time pills were taken. Boyfriend got home around 1730. Responded to vigorous sternal rub. Per boyfriend this was a suicide attempt.     Liaison met with pt, FTF, on the medical floor with sitter present.  She is alert but drowsy, oriented, thoughts are logical and goal-directed, affect is blunted, good eye contact, guarded but cooperative.  She denies current SI/HI/AVH.  Asked if pt is glad to be alive, pt responds \"I don't know how I feel about it, honestly.  I didn't expect to be here - and here I am.\"  Pt reports this as her 2nd suicide attempt and states the first one was 5 years ago when she took an OD of Xanax and seroquel.  Pt rates her current depression at a 9/10 and anxiety an 8/10.  She reports sleeping 14-16 hours daily because that is the only relief she gets from her life.  Pt reports feeling overwhelmed by life and doesn't know how to cope. She indicates her mother never taught her or her sister coping skills and she began self-harming when she was a teenager.  Pt reports the only way she knows how to cope is to take things \"one day at a time\".  Liaison provided therapeutic support, discussed admission to U, and pt is agreeable.  Liaison will continue to monitor and support.    Precipitant Factors: feeling overwhelmed by life stressors.      The information is given by:  patient and past medical records.  Current Psychiatrist and/or : Psychiatrist, Dr. Webb.  Used to see Sammie Bolivar for counseling 5yrs ago.      Previous Hospitalizations/Treatment:   [facility] 2x  [facility] 1x    Diagnosis: Bipolar 1    Plan: U recommended.      Lethality

## 2024-02-07 NOTE — ED NOTES
TRANSFER - OUT REPORT:    Verbal report given to inga on Deepti Ayala  being transferred to [facility] for routine progression of patient care       Report consisted of patient's Situation, Background, Assessment and   Recommendations(SBAR).     Information from the following report(s) Nurse Handoff Report and ED SBAR was reviewed with the receiving nurse.    Carmelo Fall Assessment:    Presents to emergency department  because of falls (Syncope, seizure, or loss of consciousness): No  Age > 70: No  Altered Mental Status, Intoxication with alcohol or substance confusion (Disorientation, impaired judgment, poor safety awaremess, or inability to follow instructions): Yes  Impaired Mobility: Ambulates or transfers with assistive devices or assistance; Unable to ambulate or transer.: Yes  Nursing Judgement: Yes          Lines:   Peripheral IV 02/06/24 Right Antecubital (Active)   Site Assessment Clean, dry & intact 02/06/24 2021   Line Status Blood return noted;Flushed 02/06/24 2021   Phlebitis Assessment No symptoms 02/06/24 2021   Infiltration Assessment 0 02/06/24 2021        Opportunity for questions and clarification was provided.      Patient transported with:  Tech

## 2024-02-07 NOTE — CONSULTS
PSYCHIATRY CONSULT NOTE    REASON FOR CONSULT:  Intentional overdose    HISTORY OF PRESENTING COMPLAINT:  Deepti Ayala is a 48 y.o. White (non-) female who is currently admitted to the medical floor at [facility]. She is alert and oriented x 4. She states that she is in the hospital due to \"too much medication. She reports that she does not know how much medications she took but she took klonopin, Wellbutrin and Lamictal. She states that her goal was to go to sleep and not wake up.     Appetite: denies changes  Sleep: reports sleeping up to 16 hours  Depression: 9/10  Anxiety: 8/10, reports a hx of panic attacks states that she just had one about two months ago for the first time in a long time and called her psychiatrist.     Stressors: reports that there has been stress but \"I have had stress before, I just could not cope I guess\"    Previous suicide attempt: about 5-6 years ago via overdose.     Recent changes: over the last month; went from prozac to wellbutrin and latuda was discontinued. She states that this change recently took place in two weeks not     When asked what she would like to see happen from here, she states \"I cannot think about that, I have to take things one day at a time\".       PAST PSYCHIATRIC HISTORY and SUBSTANCE ABUSE HISTORY:  Psychiatrist: Dr. Orion Roach, reports that her medication has been changing \"a lot\", she has been seeing her provider at least monthly.     Substance hx: marijuana- denies, alcohol- wine 2-3 nights/week (reports 2-3 glasses/night) she denies a hx of withdrawal of alcohol    PAST MEDICAL HISTORY:  Please see H&P for details.     No past medical history on file.  Prior to Admission medications    Medication Sig Start Date End Date Taking? Authorizing Provider   ALPRAZolam (XANAX) 1 MG tablet Take by mouth.    Automatic Reconciliation, Ar   cyclobenzaprine (FLEXERIL) 10 MG tablet Take 10 mg by mouth 3 times daily as needed 3/10/20   Automatic

## 2024-02-08 LAB
ANION GAP SERPL CALC-SCNC: 4 MMOL/L (ref 5–15)
APPEARANCE UR: CLEAR
BACTERIA URNS QL MICRO: NEGATIVE /HPF
BASOPHILS # BLD: 0.1 K/UL (ref 0–0.1)
BASOPHILS NFR BLD: 1 % (ref 0–1)
BILIRUB UR QL: NEGATIVE
BUN SERPL-MCNC: 8 MG/DL (ref 6–20)
BUN/CREAT SERPL: 11 (ref 12–20)
CALCIUM SERPL-MCNC: 7.5 MG/DL (ref 8.5–10.1)
CHLORIDE SERPL-SCNC: 109 MMOL/L (ref 97–108)
CO2 SERPL-SCNC: 24 MMOL/L (ref 21–32)
COLOR UR: ABNORMAL
CREAT SERPL-MCNC: 0.76 MG/DL (ref 0.55–1.02)
DIFFERENTIAL METHOD BLD: ABNORMAL
EOSINOPHIL # BLD: 0.1 K/UL (ref 0–0.4)
EOSINOPHIL NFR BLD: 1 % (ref 0–7)
EPITH CASTS URNS QL MICRO: ABNORMAL /LPF
ERYTHROCYTE [DISTWIDTH] IN BLOOD BY AUTOMATED COUNT: 11.8 % (ref 11.5–14.5)
FLUAV RNA SPEC QL NAA+PROBE: NOT DETECTED
FLUBV RNA SPEC QL NAA+PROBE: NOT DETECTED
GLUCOSE SERPL-MCNC: 84 MG/DL (ref 65–100)
GLUCOSE UR STRIP.AUTO-MCNC: NEGATIVE MG/DL
HCT VFR BLD AUTO: 34.1 % (ref 35–47)
HGB BLD-MCNC: 11.8 G/DL (ref 11.5–16)
HGB UR QL STRIP: NEGATIVE
HYALINE CASTS URNS QL MICRO: ABNORMAL /LPF (ref 0–2)
IMM GRANULOCYTES # BLD AUTO: 0 K/UL (ref 0–0.04)
IMM GRANULOCYTES NFR BLD AUTO: 0 % (ref 0–0.5)
KETONES UR QL STRIP.AUTO: NEGATIVE MG/DL
LEUKOCYTE ESTERASE UR QL STRIP.AUTO: NEGATIVE
LYMPHOCYTES # BLD: 2.6 K/UL (ref 0.8–3.5)
LYMPHOCYTES NFR BLD: 37 % (ref 12–49)
MCH RBC QN AUTO: 33.5 PG (ref 26–34)
MCHC RBC AUTO-ENTMCNC: 34.6 G/DL (ref 30–36.5)
MCV RBC AUTO: 96.9 FL (ref 80–99)
MONOCYTES # BLD: 0.5 K/UL (ref 0–1)
MONOCYTES NFR BLD: 7 % (ref 5–13)
NEUTS SEG # BLD: 3.7 K/UL (ref 1.8–8)
NEUTS SEG NFR BLD: 54 % (ref 32–75)
NITRITE UR QL STRIP.AUTO: NEGATIVE
NRBC # BLD: 0 K/UL (ref 0–0.01)
NRBC BLD-RTO: 0 PER 100 WBC
PH UR STRIP: 6.5 (ref 5–8)
PLATELET # BLD AUTO: 192 K/UL (ref 150–400)
PMV BLD AUTO: 10.6 FL (ref 8.9–12.9)
POTASSIUM SERPL-SCNC: 3.6 MMOL/L (ref 3.5–5.1)
PROT UR STRIP-MCNC: NEGATIVE MG/DL
RBC # BLD AUTO: 3.52 M/UL (ref 3.8–5.2)
RBC #/AREA URNS HPF: ABNORMAL /HPF (ref 0–5)
SARS-COV-2 RNA RESP QL NAA+PROBE: NOT DETECTED
SODIUM SERPL-SCNC: 137 MMOL/L (ref 136–145)
SP GR UR REFRACTOMETRY: 1.01 (ref 1–1.03)
URINE CULTURE IF INDICATED: ABNORMAL
UROBILINOGEN UR QL STRIP.AUTO: 1 EU/DL (ref 0.2–1)
WBC # BLD AUTO: 6.9 K/UL (ref 3.6–11)
WBC URNS QL MICRO: ABNORMAL /HPF (ref 0–4)

## 2024-02-08 PROCEDURE — 36415 COLL VENOUS BLD VENIPUNCTURE: CPT

## 2024-02-08 PROCEDURE — 94761 N-INVAS EAR/PLS OXIMETRY MLT: CPT

## 2024-02-08 PROCEDURE — 6360000002 HC RX W HCPCS: Performed by: INTERNAL MEDICINE

## 2024-02-08 PROCEDURE — 2060000000 HC ICU INTERMEDIATE R&B

## 2024-02-08 PROCEDURE — 85025 COMPLETE CBC W/AUTO DIFF WBC: CPT

## 2024-02-08 PROCEDURE — 6370000000 HC RX 637 (ALT 250 FOR IP): Performed by: INTERNAL MEDICINE

## 2024-02-08 PROCEDURE — 87636 SARSCOV2 & INF A&B AMP PRB: CPT

## 2024-02-08 PROCEDURE — 2580000003 HC RX 258: Performed by: INTERNAL MEDICINE

## 2024-02-08 PROCEDURE — 80048 BASIC METABOLIC PNL TOTAL CA: CPT

## 2024-02-08 PROCEDURE — 81001 URINALYSIS AUTO W/SCOPE: CPT

## 2024-02-08 RX ORDER — BUPROPION HYDROCHLORIDE 150 MG/1
150 TABLET ORAL EVERY MORNING
Status: DISCONTINUED | OUTPATIENT
Start: 2024-02-08 | End: 2024-02-10 | Stop reason: HOSPADM

## 2024-02-08 RX ORDER — LISDEXAMFETAMINE DIMESYLATE CAPSULES 60 MG/1
60 CAPSULE ORAL DAILY
Status: ON HOLD | COMMUNITY
End: 2024-02-10

## 2024-02-08 RX ORDER — LORAZEPAM 0.5 MG/1
0.5 TABLET ORAL EVERY 6 HOURS PRN
Status: DISCONTINUED | OUTPATIENT
Start: 2024-02-08 | End: 2024-02-10 | Stop reason: HOSPADM

## 2024-02-08 RX ORDER — LAMOTRIGINE 100 MG/1
200 TABLET ORAL DAILY
Status: DISCONTINUED | OUTPATIENT
Start: 2024-02-08 | End: 2024-02-10 | Stop reason: HOSPADM

## 2024-02-08 RX ORDER — VALACYCLOVIR HYDROCHLORIDE 500 MG/1
500 TABLET, FILM COATED ORAL DAILY
Status: DISCONTINUED | OUTPATIENT
Start: 2024-02-08 | End: 2024-02-10 | Stop reason: HOSPADM

## 2024-02-08 RX ORDER — LURASIDONE HYDROCHLORIDE 40 MG/1
20 TABLET, FILM COATED ORAL
Status: ON HOLD | COMMUNITY
End: 2024-02-15 | Stop reason: HOSPADM

## 2024-02-08 RX ORDER — BUPROPION HYDROCHLORIDE 150 MG/1
150 TABLET ORAL EVERY MORNING
Status: ON HOLD | COMMUNITY
End: 2024-02-10

## 2024-02-08 RX ORDER — LURASIDONE HYDROCHLORIDE 40 MG/1
20 TABLET, FILM COATED ORAL
Status: DISCONTINUED | OUTPATIENT
Start: 2024-02-08 | End: 2024-02-10 | Stop reason: HOSPADM

## 2024-02-08 RX ADMIN — SODIUM CHLORIDE: 9 INJECTION, SOLUTION INTRAVENOUS at 06:46

## 2024-02-08 RX ADMIN — PHENOBARBITAL 32.4 MG: 32.4 TABLET ORAL at 12:00

## 2024-02-08 RX ADMIN — LURASIDONE HYDROCHLORIDE 20 MG: 40 TABLET, FILM COATED ORAL at 17:38

## 2024-02-08 RX ADMIN — ENOXAPARIN SODIUM 40 MG: 100 INJECTION SUBCUTANEOUS at 08:39

## 2024-02-08 RX ADMIN — PHENOBARBITAL 32.4 MG: 32.4 TABLET ORAL at 08:39

## 2024-02-08 RX ADMIN — PHENOBARBITAL 32.4 MG: 32.4 TABLET ORAL at 17:38

## 2024-02-08 RX ADMIN — THIAMINE HCL TAB 100 MG 100 MG: 100 TAB at 08:38

## 2024-02-08 RX ADMIN — VALACYCLOVIR HYDROCHLORIDE 500 MG: 500 TABLET, FILM COATED ORAL at 15:39

## 2024-02-08 RX ADMIN — PHENOBARBITAL 32.4 MG: 32.4 TABLET ORAL at 22:21

## 2024-02-08 RX ADMIN — BUPROPION HYDROCHLORIDE 150 MG: 150 TABLET, EXTENDED RELEASE ORAL at 15:39

## 2024-02-08 RX ADMIN — FOLIC ACID 1 MG: 1 TABLET ORAL at 08:39

## 2024-02-08 RX ADMIN — LAMOTRIGINE 200 MG: 100 TABLET ORAL at 15:39

## 2024-02-08 RX ADMIN — LORAZEPAM 0.5 MG: 0.5 TABLET ORAL at 22:25

## 2024-02-08 RX ADMIN — LORAZEPAM 0.5 MG: 0.5 TABLET ORAL at 15:39

## 2024-02-08 RX ADMIN — THERA TABS 1 TABLET: TAB at 08:38

## 2024-02-08 NOTE — PROGRESS NOTES
2128 Spoke with [facility] at [facility]. Updated on patient's mental status and vital signs.

## 2024-02-08 NOTE — PLAN OF CARE
Problem: Safety - Adult  Goal: Free from fall injury  Outcome: Progressing     Problem: Pain  Goal: Verbalizes/displays adequate comfort level or baseline comfort level  Outcome: Progressing     Problem: ABCDS Injury Assessment  Goal: Absence of physical injury  Outcome: Progressing

## 2024-02-08 NOTE — PROGRESS NOTES
VASU LINDSAY [facility]  [address] [place] [address] [phone]      Medical Progress Note      NAME: Deepti Ayala   :  1975  MRM:  [id number]    Date/Time of service: 2024         Subjective:     Chief Complaint:  Patient was personally seen and examined by me during this time period.  Chart reviewed. Fu overdose. Denies any current thought of wanting to hurt herself or anyone else. Improved nausea. Denies any current tremors or hallucinations but notes she did have some overnight.        Objective:       Vitals:       Last 24hrs VS reviewed since prior progress note. Most recent are:    Vitals:    24 0710   BP: 135/85   Pulse: 61   Resp: 16   Temp: 98.1 °F (36.7 °C)   SpO2: 95%     SpO2 Readings from Last 6 Encounters:   24 95%          Intake/Output Summary (Last 24 hours) at 2024 0910  Last data filed at 2024 0650  Gross per 24 hour   Intake 1346.2 ml   Output --   Net 1346.2 ml          Exam:     Physical Exam:    Gen:  Well-developed, well-nourished, in no acute distress  HEENT:  Pink conjunctivae, PERRL, hearing intact to voice  Resp:  No accessory muscle use, clear breath sounds without wheezes rales or rhonchi  Card:  RRR, No murmurs, normal S1, S2, no peripheral edema  Abd:  Soft, non-tender, non-distended, normoactive bowel sounds are present  Musc:  No cyanosis or clubbing  Skin:  No rashes or ulcers, skin turgor is good  Neuro:  Cranial nerves 3-12 are grossly intact, follows commands appropriately  Psych:  Oriented to person, place, and time, Alert with good insight      Medications Reviewed: (see below)    Lab Data Reviewed: (see below)    ______________________________________________________________________    Medications:     Current Facility-Administered Medications   Medication Dose Route Frequency    PHENobarbital tablet 32.4 mg  32.4 mg Oral 4x daily    Followed by    [START ON 2024] PHENobarbital tablet 32.4 mg   self-harm, subsequent encounter ([facility]) POA:  According to the patient, her suicidal attempt is the third time now.  She admitted to taking Wellbutrin, Lamictal, Klonopin. Repeat EKG with qtc 446. Monitor on Tele      Suicide attempt ([facility]): continue one to one. Suicidal precaution.  Consult psychiatry      Elevated ethanol level: denies heavy drinking daily but states does drink heavily some days. Continue phenobarbital taper. Folic acid, thiamine and multivitamin. Consult psych     Bipolar affective disorder, currently active ([facility]): verify home meds.  Consult psychiatry    Nausea/ leukocytosis: ct abd pelvis with no acute concerns. Check UA. Advance diet as tolerated     Hypokalemia: replete prn       Total time spent with patient: 35 minutes **I personally saw and examined the patient during this time period**                 Care Plan discussed with: Patient and Nursing Staff    Discussed:  Care Plan    Prophylaxis:  Lovenox    Disposition:  likely inpatient psych placement            ___________________________________________________    Attending Physician: Koki Doshi DO

## 2024-02-08 NOTE — CONSULTS
PSYCHIATRY CONSULT NOTE    REASON FOR CONSULT:  \"I am not feeling great\"    HISTORY OF PRESENTING COMPLAINT:  Deepti Ayala is a 48 y.o. White (non-) female who is currently admitted to the medical floor at [facility] after an intentional overdose. She reports that she is more aware that her suicide attempt did not work and now she is right back where she is. She reports being disappointed that her attempt was unsuccessful. Poor eye contact, she is not very talkative. Sitter at the bedside.      PAST PSYCHIATRIC HISTORY and SUBSTANCE ABUSE HISTORY:  See previous note      PAST MEDICAL HISTORY:    Please see H&P for details.     No past medical history on file.  Prior to Admission medications    Medication Sig Start Date End Date Taking? Authorizing Provider   lurasidone (LATUDA) 40 MG TABS tablet Take 0.5 tablets by mouth Daily with supper   Yes ProviderElbert MD   buPROPion (WELLBUTRIN XL) 150 MG extended release tablet Take 1 tablet by mouth every morning   Yes ProviderElbert MD   Lisdexamfetamine Dimesylate (VYVANSE) 60 MG CAPS Take 60 mg by mouth daily. Max Daily Amount: 60 mg   Yes ProviderElbert MD   ALPRAZolam (XANAX) 1 MG tablet Take by mouth.  Patient not taking: Reported on 2/8/2024    Automatic Reconciliation, Ar   cyclobenzaprine (FLEXERIL) 10 MG tablet Take 10 mg by mouth 3 times daily as needed  Patient not taking: Reported on 2/8/2024 3/10/20   Automatic Reconciliation, Ar   lamoTRIgine (LAMICTAL) 200 MG tablet Take 1 tablet by mouth daily    Automatic Reconciliation, Ar   metFORMIN (GLUCOPHAGE) 1000 MG tablet Take 2,000 mg by mouth 2 times daily (with meals)  Patient not taking: Reported on 2/8/2024    Automatic Reconciliation, Ar   methylPREDNISolone (MEDROL DOSEPACK) 4 MG tablet Take as directed with food  Patient not taking: Reported on 2/8/2024 3/10/20   Automatic Reconciliation, Ar   nabumetone (RELAFEN) 750 MG tablet Take 750 mg by mouth 2 times daily  Patient not

## 2024-02-09 ENCOUNTER — APPOINTMENT (OUTPATIENT)
Facility: HOSPITAL | Age: 49
End: 2024-02-09
Payer: COMMERCIAL

## 2024-02-09 VITALS
TEMPERATURE: 98.1 F | HEART RATE: 68 BPM | OXYGEN SATURATION: 95 % | DIASTOLIC BLOOD PRESSURE: 61 MMHG | BODY MASS INDEX: 24.8 KG/M2 | WEIGHT: 140 LBS | SYSTOLIC BLOOD PRESSURE: 99 MMHG | HEIGHT: 63 IN | RESPIRATION RATE: 16 BRPM

## 2024-02-09 LAB
ANION GAP SERPL CALC-SCNC: 4 MMOL/L (ref 5–15)
BASOPHILS # BLD: 0.1 K/UL (ref 0–0.1)
BASOPHILS NFR BLD: 1 % (ref 0–1)
BUN SERPL-MCNC: 12 MG/DL (ref 6–20)
BUN/CREAT SERPL: 15 (ref 12–20)
CALCIUM SERPL-MCNC: 9 MG/DL (ref 8.5–10.1)
CHLORIDE SERPL-SCNC: 107 MMOL/L (ref 97–108)
CO2 SERPL-SCNC: 27 MMOL/L (ref 21–32)
CREAT SERPL-MCNC: 0.8 MG/DL (ref 0.55–1.02)
DIFFERENTIAL METHOD BLD: NORMAL
EKG ATRIAL RATE: 80 BPM
EKG DIAGNOSIS: NORMAL
EKG P AXIS: 60 DEGREES
EKG P-R INTERVAL: 144 MS
EKG Q-T INTERVAL: 384 MS
EKG QRS DURATION: 76 MS
EKG QTC CALCULATION (BAZETT): 442 MS
EKG R AXIS: 26 DEGREES
EKG T AXIS: 71 DEGREES
EKG VENTRICULAR RATE: 80 BPM
EOSINOPHIL # BLD: 0.2 K/UL (ref 0–0.4)
EOSINOPHIL NFR BLD: 3 % (ref 0–7)
ERYTHROCYTE [DISTWIDTH] IN BLOOD BY AUTOMATED COUNT: 11.8 % (ref 11.5–14.5)
GLUCOSE SERPL-MCNC: 98 MG/DL (ref 65–100)
HCT VFR BLD AUTO: 38.2 % (ref 35–47)
HGB BLD-MCNC: 13.5 G/DL (ref 11.5–16)
IMM GRANULOCYTES # BLD AUTO: 0 K/UL (ref 0–0.04)
IMM GRANULOCYTES NFR BLD AUTO: 0 % (ref 0–0.5)
LYMPHOCYTES # BLD: 3 K/UL (ref 0.8–3.5)
LYMPHOCYTES NFR BLD: 43 % (ref 12–49)
MCH RBC QN AUTO: 33.3 PG (ref 26–34)
MCHC RBC AUTO-ENTMCNC: 35.3 G/DL (ref 30–36.5)
MCV RBC AUTO: 94.3 FL (ref 80–99)
MONOCYTES # BLD: 0.6 K/UL (ref 0–1)
MONOCYTES NFR BLD: 8 % (ref 5–13)
NEUTS SEG # BLD: 3.1 K/UL (ref 1.8–8)
NEUTS SEG NFR BLD: 45 % (ref 32–75)
NRBC # BLD: 0 K/UL (ref 0–0.01)
NRBC BLD-RTO: 0 PER 100 WBC
PLATELET # BLD AUTO: 215 K/UL (ref 150–400)
PMV BLD AUTO: 10.6 FL (ref 8.9–12.9)
POTASSIUM SERPL-SCNC: 3.8 MMOL/L (ref 3.5–5.1)
RBC # BLD AUTO: 4.05 M/UL (ref 3.8–5.2)
SODIUM SERPL-SCNC: 138 MMOL/L (ref 136–145)
WBC # BLD AUTO: 6.9 K/UL (ref 3.6–11)

## 2024-02-09 PROCEDURE — 70450 CT HEAD/BRAIN W/O DYE: CPT

## 2024-02-09 PROCEDURE — 6370000000 HC RX 637 (ALT 250 FOR IP): Performed by: INTERNAL MEDICINE

## 2024-02-09 PROCEDURE — 6360000002 HC RX W HCPCS: Performed by: INTERNAL MEDICINE

## 2024-02-09 PROCEDURE — 94761 N-INVAS EAR/PLS OXIMETRY MLT: CPT

## 2024-02-09 PROCEDURE — 36415 COLL VENOUS BLD VENIPUNCTURE: CPT

## 2024-02-09 PROCEDURE — 80048 BASIC METABOLIC PNL TOTAL CA: CPT

## 2024-02-09 PROCEDURE — 85025 COMPLETE CBC W/AUTO DIFF WBC: CPT

## 2024-02-09 PROCEDURE — 93005 ELECTROCARDIOGRAM TRACING: CPT | Performed by: INTERNAL MEDICINE

## 2024-02-09 RX ORDER — MULTIVITAMIN WITH IRON
1 TABLET ORAL DAILY
Qty: 30 TABLET | Refills: 0 | Status: ON HOLD
Start: 2024-02-10 | End: 2024-02-15 | Stop reason: HOSPADM

## 2024-02-09 RX ORDER — PHENOBARBITAL 32.4 MG/1
32.4 TABLET ORAL 2 TIMES DAILY
Qty: 1 TABLET | Refills: 0 | Status: ON HOLD
Start: 2024-02-09 | End: 2024-02-15 | Stop reason: HOSPADM

## 2024-02-09 RX ORDER — FOLIC ACID 1 MG/1
1 TABLET ORAL DAILY
Qty: 30 TABLET | Refills: 0 | Status: ON HOLD
Start: 2024-02-10 | End: 2024-02-10

## 2024-02-09 RX ORDER — PHENOBARBITAL 16.2 MG/1
16.2 TABLET ORAL 2 TIMES DAILY
Qty: 2 TABLET | Refills: 0 | Status: ON HOLD
Start: 2024-02-10 | End: 2024-02-15 | Stop reason: HOSPADM

## 2024-02-09 RX ORDER — THIAMINE MONONITRATE (VIT B1) 100 MG
100 TABLET ORAL DAILY
Qty: 30 TABLET | Refills: 0 | Status: ON HOLD
Start: 2024-02-10 | End: 2024-02-15 | Stop reason: HOSPADM

## 2024-02-09 RX ADMIN — BUPROPION HYDROCHLORIDE 150 MG: 150 TABLET, EXTENDED RELEASE ORAL at 10:22

## 2024-02-09 RX ADMIN — ACETAMINOPHEN 650 MG: 325 TABLET ORAL at 16:46

## 2024-02-09 RX ADMIN — LORAZEPAM 0.5 MG: 0.5 TABLET ORAL at 15:53

## 2024-02-09 RX ADMIN — LAMOTRIGINE 200 MG: 100 TABLET ORAL at 10:21

## 2024-02-09 RX ADMIN — THIAMINE HCL TAB 100 MG 100 MG: 100 TAB at 10:29

## 2024-02-09 RX ADMIN — ENOXAPARIN SODIUM 40 MG: 100 INJECTION SUBCUTANEOUS at 10:30

## 2024-02-09 RX ADMIN — VALACYCLOVIR HYDROCHLORIDE 500 MG: 500 TABLET, FILM COATED ORAL at 10:29

## 2024-02-09 RX ADMIN — LURASIDONE HYDROCHLORIDE 20 MG: 40 TABLET, FILM COATED ORAL at 15:59

## 2024-02-09 RX ADMIN — PHENOBARBITAL 32.4 MG: 32.4 TABLET ORAL at 10:21

## 2024-02-09 RX ADMIN — FOLIC ACID 1 MG: 1 TABLET ORAL at 10:22

## 2024-02-09 RX ADMIN — THERA TABS 1 TABLET: TAB at 10:22

## 2024-02-09 RX ADMIN — PHENOBARBITAL 32.4 MG: 32.4 TABLET ORAL at 20:24

## 2024-02-09 RX ADMIN — LORAZEPAM 0.5 MG: 0.5 TABLET ORAL at 20:24

## 2024-02-09 NOTE — DISCHARGE INSTRUCTIONS
HOSPITALIST DISCHARGE INSTRUCTIONS  NAME: Deepti Ayala   :  1975   MRN:  [id number]     Date/Time:  2024 12:06 PM    ADMIT DATE: 2024     DISCHARGE DATE: 2024     ADMITTING DIAGNOSIS:  Suicide Attempt     DISCHARGE DIAGNOSIS:  Same as above     MEDICATIONS:       It is important that you take the medication exactly as they are prescribed.   Keep your medication in the bottles provided by the pharmacist and keep a list of the medication names, dosages, and times to be taken in your wallet.   Do not take other medications without consulting your doctor     Pain Management: per above medications    What to do at Home    Recommended diet:  regular diet    Recommended activity: suicide precautions     1) Return to the hospital if you feel worse    2) If you experience any of the following symptoms then please call your primary care physician or return to the emergency room if you cannot get hold of your doctor:  Fever, chills, nausea, vomiting, diarrhea, change in mentation, falling, bleeding, shortness of breath, chest pain, severe headache, severe abdominal pain,.     Follow Up:  Hospital Follow Up  Follow up  Please follow up per [facility]      Information obtained by :  I understand that if any problems occur once I am at home I am to contact my physician.    I understand and acknowledge receipt of the instructions indicated above.                                                                                                                                           Physician's or R.N.'s Signature                                                                  Date/Time                                                                                                                                              Patient or Representative Signature                                                          Date/Time

## 2024-02-09 NOTE — BH NOTE
TRANSFER - IN REPORT:    Verbal report received from RN on Deepti Ayala  being received from [facility] for routine progression of patient care      Report consisted of patient's Situation, Background, Assessment and   Recommendations(SBAR).     Information from the following report(s) Index, Adult Overview, MAR, and Recent Results was reviewed with the receiving nurse.    Opportunity for questions and clarification was provided.      Assessment completed upon patient's arrival to unit and care assumed.

## 2024-02-09 NOTE — DISCHARGE SUMMARY
[facility] [place]  [address] [place] [address] [phone]          Hospitalist Discharge Summary     Patient ID:  Deepti Ayala  [id number]  48 y.o.  1975    Admit date: 2/6/2024    Discharge date and time: 2/9/2024 12:13 PM    Admission Diagnoses: Suicidal ideation [R45.851]  Intentional drug overdose, initial encounter ([facility]) [T50.902A]  Drug overdose, intentional self-harm, initial encounter ([facility]) [T50.902A]    Discharge Diagnoses:  Principal Diagnosis Drug overdose, intentional self-harm, initial encounter ([facility])                                            Principal Problem:    Drug overdose, intentional self-harm, initial encounter ([facility])  Active Problems:    Suicide attempt ([facility])    Hypokalemia    Bipolar affective disorder, currently active ([facility])  Resolved Problems:    * No resolved hospital problems. *         Hospital Course:     Drug overdose, intentional self-harm, subsequent encounter ([facility]) POA:  According to the patient, her suicidal attempt is the third time now.  She admitted to taking Wellbutrin, Lamictal, Klonopin. Repeat EKG with qtcs wnl. DC to inpatient psych      Suicide attempt ([facility]): continue one to one. Suicidal precaution. DC to inpatient psychiatry      Elevated ethanol level: denies heavy drinking daily but states does drink heavily some days. Continue phenobarbital taper. Folic acid, thiamine and multivitamin. Dc to inpatient psych      Bipolar affective disorder, currently active ([facility]): resume home meds. Dc to inpatient psych      Nausea/ leukocytosis: ct abd pelvis with no acute concerns. UA w/o no eo of infection. Tolerating diet     Light headedness : ct head no acute concerns     Hypokalemia: replete prn     PCP: Ally Sears MD     Consults: psychiatry    Significant Diagnostic Studies:     Ct abd plevis  FINDINGS:   LOWER THORAX: No significant abnormality in the incidentally imaged lower chest.  LIVER: No mass.  BILIARY TREE:

## 2024-02-09 NOTE — CARE COORDINATION
Care Management Discharge Note:   Patient to transfer to [facility] for inpatient psych stay. EMTALA completed by MD. No identified CM needs.    ______________________  Pooja RUFF, RN  Care Management  2/9/2024  12:50 PM

## 2024-02-09 NOTE — PLAN OF CARE
Problem: Safety - Adult  Goal: Free from fall injury  Outcome: Adequate for Discharge     Problem: Pain  Goal: Verbalizes/displays adequate comfort level or baseline comfort level  Outcome: Adequate for Discharge  Flowsheets (Taken 2/9/2024 3053)  Verbalizes/displays adequate comfort level or baseline comfort level:   Encourage patient to monitor pain and request assistance   Assess pain using appropriate pain scale     Problem: ABCDS Injury Assessment  Goal: Absence of physical injury  Outcome: Adequate for Discharge     Problem: Anxiety  Goal: Will report anxiety at manageable levels  Description: INTERVENTIONS:  1. Administer medication as ordered  2. Teach and rehearse alternative coping skills  3. Provide emotional support with 1:1 interaction with staff  Outcome: Adequate for Discharge     Problem: Coping  Goal: Pt/Family able to verbalize concerns and demonstrate effective coping strategies  Description: INTERVENTIONS:  1. Assist patient/family to identify coping skills, available support systems and cultural and spiritual values  2. Provide emotional support, including active listening and acknowledgement of concerns of patient and caregivers  3. Reduce environmental stimuli, as able  4. Instruct patient/family in relaxation techniques, as appropriate  5. Assess for spiritual pain/suffering and initiate Spiritual Care, Psychosocial Clinical Specialist consults as needed  Outcome: Adequate for Discharge     Problem: Change in Body Image  Goal: Pt/Family communicate acceptance of loss or change in body image and feel psychological comfort and peace  Description: INTERVENTIONS:  1. Assess patient/family anxiety and grief process related to change in body image, loss of functional status, loss of sense of self, and forgiveness  2. Provide emotional and spiritual support  3. Provide information about the patient's health status with consideration of family and cultural values  4. Communicate willingness to  Initiate referral to Social Work and notify Licensed Independent Practictioner (LIP)  3. Provide appropriate education and resources to patient and/or family  4. Initiate referral to Adult Protective Services, as appropriate  5. Initiate referral to Child Protective Services, as appropriate  6. Offer to have the patient's the patient's chart marked as Non-disclosed/Privacy patient for phone inquiries, as appropriate  7. Provide emotional support, including active listening and acknowledgment of concerns  Outcome: Adequate for Discharge     Problem: Drug Abuse/Detox  Goal: Will have no detox symptoms and will verbalize plan for changing drug-related behavior  Description: INTERVENTIONS:  1. Administer medication as ordered  2. Monitor physical status  3. Provide emotional support with 1:1 interaction with staff  4. Encourage  recovery focused treatment   Outcome: Adequate for Discharge

## 2024-02-10 ENCOUNTER — HOSPITAL ENCOUNTER (INPATIENT)
Facility: HOSPITAL | Age: 49
LOS: 6 days | Discharge: HOME HEALTH | End: 2024-02-16
Attending: STUDENT IN AN ORGANIZED HEALTH CARE EDUCATION/TRAINING PROGRAM | Admitting: STUDENT IN AN ORGANIZED HEALTH CARE EDUCATION/TRAINING PROGRAM
Payer: COMMERCIAL

## 2024-02-10 PROBLEM — F32.A DEPRESSION: Status: ACTIVE | Noted: 2024-02-10

## 2024-02-10 PROCEDURE — 6370000000 HC RX 637 (ALT 250 FOR IP)

## 2024-02-10 PROCEDURE — 6370000000 HC RX 637 (ALT 250 FOR IP): Performed by: PSYCHIATRY & NEUROLOGY

## 2024-02-10 PROCEDURE — 1240000000 HC EMOTIONAL WELLNESS R&B

## 2024-02-10 RX ORDER — HYDROXYZINE 50 MG/1
50 TABLET, FILM COATED ORAL 3 TIMES DAILY PRN
Status: DISCONTINUED | OUTPATIENT
Start: 2024-02-10 | End: 2024-02-16 | Stop reason: HOSPADM

## 2024-02-10 RX ORDER — TRAZODONE HYDROCHLORIDE 50 MG/1
50 TABLET ORAL NIGHTLY PRN
Status: DISCONTINUED | OUTPATIENT
Start: 2024-02-10 | End: 2024-02-16 | Stop reason: HOSPADM

## 2024-02-10 RX ORDER — LURASIDONE HYDROCHLORIDE 20 MG/1
20 TABLET, FILM COATED ORAL
Status: DISCONTINUED | OUTPATIENT
Start: 2024-02-10 | End: 2024-02-12

## 2024-02-10 RX ORDER — POLYETHYLENE GLYCOL 3350 17 G/17G
17 POWDER, FOR SOLUTION ORAL DAILY PRN
Status: DISCONTINUED | OUTPATIENT
Start: 2024-02-10 | End: 2024-02-16 | Stop reason: HOSPADM

## 2024-02-10 RX ORDER — HALOPERIDOL 5 MG/1
5 TABLET ORAL EVERY 4 HOURS PRN
Status: DISCONTINUED | OUTPATIENT
Start: 2024-02-10 | End: 2024-02-16 | Stop reason: HOSPADM

## 2024-02-10 RX ORDER — LANOLIN ALCOHOL/MO/W.PET/CERES
100 CREAM (GRAM) TOPICAL DAILY
Status: DISCONTINUED | OUTPATIENT
Start: 2024-02-10 | End: 2024-02-16 | Stop reason: HOSPADM

## 2024-02-10 RX ORDER — DIPHENHYDRAMINE HYDROCHLORIDE 50 MG/ML
50 INJECTION INTRAMUSCULAR; INTRAVENOUS EVERY 4 HOURS PRN
Status: DISCONTINUED | OUTPATIENT
Start: 2024-02-10 | End: 2024-02-16 | Stop reason: HOSPADM

## 2024-02-10 RX ORDER — MULTIVITAMIN WITH IRON
1 TABLET ORAL DAILY
Status: DISCONTINUED | OUTPATIENT
Start: 2024-02-10 | End: 2024-02-16 | Stop reason: HOSPADM

## 2024-02-10 RX ORDER — VALACYCLOVIR HYDROCHLORIDE 500 MG/1
500 TABLET, FILM COATED ORAL DAILY
Status: DISCONTINUED | OUTPATIENT
Start: 2024-02-10 | End: 2024-02-16 | Stop reason: HOSPADM

## 2024-02-10 RX ORDER — ACETAMINOPHEN 325 MG/1
650 TABLET ORAL EVERY 4 HOURS PRN
Status: DISCONTINUED | OUTPATIENT
Start: 2024-02-10 | End: 2024-02-15

## 2024-02-10 RX ORDER — HALOPERIDOL 5 MG/ML
5 INJECTION INTRAMUSCULAR EVERY 4 HOURS PRN
Status: DISCONTINUED | OUTPATIENT
Start: 2024-02-10 | End: 2024-02-16 | Stop reason: HOSPADM

## 2024-02-10 RX ORDER — FOLIC ACID 1 MG/1
1 TABLET ORAL DAILY
Status: DISCONTINUED | OUTPATIENT
Start: 2024-02-10 | End: 2024-02-16 | Stop reason: HOSPADM

## 2024-02-10 RX ORDER — SENNOSIDES A AND B 8.6 MG/1
1 TABLET, FILM COATED ORAL DAILY PRN
Status: DISCONTINUED | OUTPATIENT
Start: 2024-02-10 | End: 2024-02-16 | Stop reason: HOSPADM

## 2024-02-10 RX ORDER — MAGNESIUM HYDROXIDE/ALUMINUM HYDROXICE/SIMETHICONE 120; 1200; 1200 MG/30ML; MG/30ML; MG/30ML
30 SUSPENSION ORAL EVERY 6 HOURS PRN
Status: DISCONTINUED | OUTPATIENT
Start: 2024-02-10 | End: 2024-02-16 | Stop reason: HOSPADM

## 2024-02-10 RX ADMIN — Medication 100 MG: at 09:15

## 2024-02-10 RX ADMIN — LURASIDONE HYDROCHLORIDE 20 MG: 20 TABLET ORAL at 16:54

## 2024-02-10 RX ADMIN — SENNOSIDES 8.6 MG: 8.6 TABLET, FILM COATED ORAL at 09:18

## 2024-02-10 RX ADMIN — HYDROXYZINE HYDROCHLORIDE 50 MG: 50 TABLET, FILM COATED ORAL at 01:21

## 2024-02-10 RX ADMIN — THERA TABS 1 TABLET: TAB at 09:15

## 2024-02-10 RX ADMIN — HYDROXYZINE HYDROCHLORIDE 50 MG: 50 TABLET, FILM COATED ORAL at 17:26

## 2024-02-10 RX ADMIN — TRAZODONE HYDROCHLORIDE 50 MG: 50 TABLET ORAL at 21:20

## 2024-02-10 RX ADMIN — VALACYCLOVIR HYDROCHLORIDE 500 MG: 500 TABLET, FILM COATED ORAL at 13:02

## 2024-02-10 RX ADMIN — Medication 1 MG: at 09:15

## 2024-02-10 ASSESSMENT — SLEEP AND FATIGUE QUESTIONNAIRES
DO YOU HAVE DIFFICULTY SLEEPING: YES
DO YOU USE A SLEEP AID: NO
AVERAGE NUMBER OF SLEEP HOURS: 7

## 2024-02-10 ASSESSMENT — LIFESTYLE VARIABLES
HOW MANY STANDARD DRINKS CONTAINING ALCOHOL DO YOU HAVE ON A TYPICAL DAY: 3 OR 4
HOW OFTEN DO YOU HAVE A DRINK CONTAINING ALCOHOL: 2-4 TIMES A MONTH

## 2024-02-10 ASSESSMENT — PAIN SCALES - GENERAL: PAINLEVEL_OUTOF10: 0

## 2024-02-10 ASSESSMENT — PAIN - FUNCTIONAL ASSESSMENT
PAIN_FUNCTIONAL_ASSESSMENT: NONE - DENIES PAIN
PAIN_FUNCTIONAL_ASSESSMENT: NONE - DENIES PAIN

## 2024-02-10 NOTE — PLAN OF CARE
Problem: Depression  Goal: Will be euthymic at discharge  Description: INTERVENTIONS:  1. Administer medication as ordered  2. Provide emotional support via 1:1 interaction with staff  3. Encourage involvement in milieu/groups/activities  4. Monitor for social isolation  Outcome: Progressing  Note: Pt participated in treatment team. Out on the unit and interacting with select peers. Ate breakfast during the shift. Reviewed medications during treatment team. Complaining of an increase in anxiety. Cooperative with staff.

## 2024-02-10 NOTE — H&P
How often does anyone, including family and friends, insult or talk down to you?: Not on file     How often does anyone, including family and friends, threaten you with harm?: Not on file   Utilities: At Risk (2/10/2024)    [facility] Utilities     Threatened with loss of utilities: Yes        Medications were reconciled to the best of my ability given all available resources at the time of admission. Route is PO if not otherwise noted.     Family and social history were personally reviewed, all pertinent and relevant details are outlined as above.    Objective:   BP (!) 104/56   Pulse 89   Temp 97.9 °F (36.6 °C) (Oral)   Resp 16   Ht 1.6 m (5' 3\")   Wt 63.5 kg (140 lb)   SpO2 99%   BMI 24.80 kg/m²         PHYSICAL EXAM:   General: Alert x oriented x 3, awake, no acute distress,   HEENT: PEERL, EOMI, moist mucus membranes  Neck: Supple, no JVD, no meningeal signs  Chest: Clear to auscultation bilaterally   CVS: RRR, S1 S2 heard, no murmurs/rubs/gallops  Abd: Soft, non-tender, non-distended, +bowel sounds   Ext: No clubbing, no cyanosis, no edema  Neuro/Psych: Pleasant mood and affect, CN 2-12 grossly intact, sensory grossly within normal limit, Strength 5/5 in all extremities, DTR 1+ x 4  Cap refill: Brisk, less than 3 seconds  Pulses: 2+, symmetric in all extremities  Skin: Warm, dry, without rashes or lesions    Data Review:   I have independently reviewed and interpreted patient's lab and all other diagnostic data    Abnormal Labs Reviewed - No data to display    [unfilled]    IMAGING:   No orders to display        ECG/ECHO:  [unfilled]       Notes reviewed from all clinical/nonclinical/nursing services involved in patient's clinical care. Care coordination discussions were held with appropriate clinical/nonclinical/ nursing providers based on care coordination needs.     Assessment:   Given the patient's current clinical presentation, there is a high level of concern for decompensation if  discharged from the emergency department. Complex decision making was performed, which includes reviewing the patient's available past medical records, laboratory results, and imaging studies.    Principal Problem:    Depression  Resolved Problems:    * No resolved hospital problems. *      Plan:   Bipolar Disorder  Suicidal Ideation  - QTC normalized, patient asymptomatic  - Management per primary team    Remainder of management per primary team. Hospitalist will sign off at this time. Please re-consult if any acute medical concerns arise  Signed By: David M Milligram, PA-C     February 10, 2024         Please note that this dictation may have been completed with Dragon, the [employer] voice recognition software.  Quite often unanticipated grammatical, syntax, homophones, and other interpretive errors are inadvertently transcribed by the computer software.  Please disregard these errors.  Please excuse any errors that have escaped final proofreading.

## 2024-02-10 NOTE — BH NOTE
Patient arrived to [facility] from  ICU @ 0035.     Perfect serve sent @ 0109 requesting H&P. Message read @ 0110.    PRN Medication Documentation    Specific patient behavior that led to need for PRN medication: pt restless, insomnia, med given to promote sleep and aid in reducing anxiety  Staff interventions attempted prior to PRN being given: medication education, therapeutic listening  PRN medication given: Atarax 50mg PO @ 0121  Patient response/effectiveness of PRN medication: effective, patient resting in bed with eyes closed, NAD, and even labored respirations

## 2024-02-10 NOTE — BH NOTE
4 Eyes Skin Assessment     NAME:  Deepti Ayala  YOB: 1975  MEDICAL RECORD NUMBER:  [id number]    The patient is being assessed for  Admission    I agree that at least one RN has performed a thorough Head to Toe Skin Assessment on the patient. ALL assessment sites listed below have been assessed.      Areas assessed by both nurses:    Head, Face, Ears, Shoulders, Back, Chest, Arms, Elbows, Hands, Sacrum. Buttock, Coccyx, Ischium, and Legs. Feet and Heels        Does the Patient have a Wound? No noted wound(s)       Joni Prevention initiated by RN: Yes  Wound Care Orders initiated by RN: No    Pressure Injury (Stage 3,4, Unstageable, DTI, NWPT, and Complex wounds) if present, place Wound referral order by RN under : No    New Ostomies, if present place, Ostomy referral order under : No     Nurse 1 eSignature: Electronically signed by Vika Butler RN on 2/10/24 at 1:58 AM EST    **SHARE this note so that the co-signing nurse can place an eSignature**    Nurse 2 eSignature:Michell Smith RN

## 2024-02-10 NOTE — PROGRESS NOTES
Behavioral Services  Medicare Certification Upon Admission    I certify that this patient's inpatient psychiatric hospital admission is medically necessary for:    [x] (1) Treatment which could reasonably be expected to improve this patient's condition,       [] (2) Or for diagnostic study;     AND     [x](2) The inpatient psychiatric services are provided while the individual is under the care of a physician and are included in the individualized plan of care.    Estimated length of stay/service 3-5 days.    Plan for post-hospital care Outpatient Psychiatry.    Electronically signed by Briana Villalobos MD on 2/10/2024 at 10:45 AM

## 2024-02-10 NOTE — H&P
none  Alcohol: social  IVD: none  No Cocaine/Heroin/amphetamines/LSD/PCP/Ketamine    LMP  Finished       Allergies:  Allergies   Allergen Reactions    Latex Itching       PAST MEDICAL HISTORY:    Please see H&P for details.     No past medical history on file.  Prior to Admission medications    Medication Sig Start Date End Date Taking? Authorizing Provider   folic acid (FOLVITE) 1 MG tablet Take 1 tablet by mouth daily  Patient not taking: Reported on 2/10/2024 2/10/24   Kkoi Doshi DO   PHENobarbital 32.4 MG tablet Take 1 tablet by mouth 2 times daily for 1 dose. Max Daily Amount: 64.8 mg  Patient not taking: Reported on 2/10/2024 2/9/24 2/10/24  Koki Doshi DO   PHENobarbital 16.2 MG tablet Take 1 tablet by mouth 2 times daily for 2 doses. Max Daily Amount: 32.4 mg  Patient not taking: Reported on 2/10/2024 2/10/24 2/11/24  Koki Doshi DO   Multiple Vitamin (MULTIVITAMIN) TABS tablet Take 1 tablet by mouth daily  Patient not taking: Reported on 2/10/2024 2/10/24   Koki Doshi DO   thiamine mononitrate (THIAMINE) 100 MG tablet Take 1 tablet by mouth daily  Patient not taking: Reported on 2/10/2024 2/10/24   Koki Doshi DO   lurasidone (LATUDA) 40 MG TABS tablet Take 0.5 tablets by mouth Daily with supper  Patient not taking: Reported on 2/10/2024    Elbert Franco MD   buPROPion (WELLBUTRIN XL) 150 MG extended release tablet Take 1 tablet by mouth every morning    Elbert Franco MD   Lisdexamfetamine Dimesylate (VYVANSE) 60 MG CAPS Take 60 mg by mouth daily. Max Daily Amount: 60 mg    Elbert Franco MD   lamoTRIgine (LAMICTAL) 200 MG tablet Take 1 tablet by mouth daily    Automatic Reconciliation, Ar   valACYclovir (VALTREX) 500 MG tablet Take 1 tablet by mouth daily    Automatic Reconciliation, Ar     Lab Results   Component Value Date    WBC 6.9 02/09/2024    HGB 13.5 02/09/2024    HCT 38.2 02/09/2024    MCV 94.3 02/09/2024     02/09/2024     Lab Results   Component Value Date      02/09/2024    K 3.8 02/09/2024     02/09/2024    CO2 27 02/09/2024    BUN 12 02/09/2024    CREATININE 0.80 02/09/2024    GLUCOSE 98 02/09/2024    CALCIUM 9.0 02/09/2024    PROT 6.6 02/07/2024    LABALBU 3.5 02/07/2024    BILITOT 0.4 02/07/2024    ALKPHOS 53 02/07/2024    AST 15 02/07/2024    ALT 19 02/07/2024    LABGLOM >60 02/09/2024    AGRATIO 1.1 02/07/2024    GLOB 3.1 02/07/2024     FAMILY HISTORY:  No past suicide attempts/completions  No Bipolar disorder or Schizophrenia diagnoses in the family  No Substance use in the family    PSYCHOSOCIAL HISTORY:  Lives w/parents  2 sisters, supporitve.  1 20yo. Her bf has 2 sons.  Has  on paper. Boyfriend 13 years.  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    MENTAL STATUS EXAM:  49yo WF w/short hair is dressed casually. She is engaged in the evaluation and maintains good eye contact throughout  Speech: Spontaneous, has NL rate, volume and prosody.  Thought Process is Logical, linear, and goal directed.  Mood is reported as \"just done\"  Affect is congruent and dysthymic  Recurrent Suicidal thoughts, intents but no specific plan.  No Homicidal thoughts, intents or plans. Has Access to fire-arms  Denies experiencing hallucinations of any type.  Perception is negative for paranoia or delusional thinking  Attention/Concentration are both intact  Recent/Remote memories are intact per answers to my evaluation questions.  Insight is poor. Judgment is poor  Cognition: Intact grossly.     ASSESSMENT:  Deepti Ayala meets criteria for a diagnosis of   .    Bipolar Disorder 1, most recent episode depressed, severe, without psychotic features.    PLAN:  Recommend inpatient psychiatric admission to mitigate the risk of further decompensation.  Will obtain pertinent labs and collateral information.Encourage patient to participate in programming and group activities.  Medication Regimen As follows:  Discussed starting latuda

## 2024-02-10 NOTE — BH NOTE
PRN Medication Documentation    Specific patient behavior that led to need for PRN medication: c/o anxiety  Staff interventions attempted prior to PRN being given: coping skills  PRN medication given: atarax  Patient response/effectiveness of PRN medication: silas aware

## 2024-02-10 NOTE — BH NOTE
[facility]  Admission Note     Admission Type:   Admission Type: Voluntary    Reason for admission: Depression       PATIENT STRENGTHS: Willing to seek treatment on a voluntary basis.       Patient Strengths and Limitations: Limitations are unknown at this time.          Addictive Behavior:        Medical Problems:   No past medical history on file.    Status EXAM:  Mental Status and Behavioral Exam  Normal: Yes  Level of Assistance: Independent/Self  Facial Expression: Flat  Affect: Congruent  Level of Consciousness: Alert  Frequency of Checks: 4 times per hour, close  Mood:Normal: No  Mood: Depressed, Sad  Motor Activity:Normal: Yes  Eye Contact: Fair  Observed Behavior: Withdrawn, Guarded  Sexual Misconduct History: Current - no  Preception: [place] to person, [place] to time, [place] to place, [place] to situation  Attention:Normal: Yes  Thought Processes: Unremarkable  Thought Content:Normal: Yes  Depression Symptoms: Feelings of helplessness, Feelings of hopelessess, Sleep disturbance  Anxiety Symptoms: Generalized  Mely Symptoms: No problems reported or observed.  Hallucinations: None  Delusions: No  Memory:Normal: Yes  Insight and Judgment: No  Insight and Judgment: Poor judgment, Poor insight    Pt admitted with followings belongings:  Dental Appliances: None  Vision - Corrective Lenses: Eyeglasses, Contact Lenses (glasses w/ pt; contacts in medroom and belongs)  Hearing Aid: None  Jewelry: Earrings, Ring, Bracelet, Other (Comment) (ring and braclet in medroom; earring w/ pt)  Body Piercings Removed: Yes (just ring and bracelt in medroom)  Clothing: Shirt, Pants, Undergarments, Sweater, Footwear, Other (Comment) (undergarments, shirts, footwear, and 1 pant w/ pt; bag and x3 pants in belongs)  Other Valuables: Other (Comment) (none)     Valuables sent home with N/A. Valuables placed in safe in security envelope, number:  Yes. Patient's home medications were need to be verified with home

## 2024-02-10 NOTE — PLAN OF CARE
Problem: Anxiety  Goal: Will report anxiety at manageable levels  Description: INTERVENTIONS:  1. Administer medication as ordered  2. Teach and rehearse alternative coping skills  3. Provide emotional support with 1:1 interaction with staff  Outcome: Progressing   Will continue to observe for increase anxiety

## 2024-02-11 PROCEDURE — 6370000000 HC RX 637 (ALT 250 FOR IP)

## 2024-02-11 PROCEDURE — 6370000000 HC RX 637 (ALT 250 FOR IP): Performed by: PSYCHIATRY & NEUROLOGY

## 2024-02-11 PROCEDURE — 1240000000 HC EMOTIONAL WELLNESS R&B

## 2024-02-11 RX ORDER — BISACODYL 10 MG
10 SUPPOSITORY, RECTAL RECTAL ONCE
Status: COMPLETED | OUTPATIENT
Start: 2024-02-11 | End: 2024-02-11

## 2024-02-11 RX ORDER — BISACODYL 10 MG
10 SUPPOSITORY, RECTAL RECTAL DAILY PRN
Status: DISCONTINUED | OUTPATIENT
Start: 2024-02-11 | End: 2024-02-16 | Stop reason: HOSPADM

## 2024-02-11 RX ADMIN — TRAZODONE HYDROCHLORIDE 50 MG: 50 TABLET ORAL at 20:50

## 2024-02-11 RX ADMIN — VALACYCLOVIR HYDROCHLORIDE 500 MG: 500 TABLET, FILM COATED ORAL at 08:21

## 2024-02-11 RX ADMIN — LURASIDONE HYDROCHLORIDE 20 MG: 20 TABLET ORAL at 16:41

## 2024-02-11 RX ADMIN — THERA TABS 1 TABLET: TAB at 08:21

## 2024-02-11 RX ADMIN — BISACODYL 10 MG: 10 SUPPOSITORY RECTAL at 16:58

## 2024-02-11 RX ADMIN — Medication 100 MG: at 08:21

## 2024-02-11 RX ADMIN — Medication 1 MG: at 08:21

## 2024-02-11 RX ADMIN — HYDROXYZINE HYDROCHLORIDE 50 MG: 50 TABLET, FILM COATED ORAL at 12:45

## 2024-02-11 ASSESSMENT — PAIN SCALES - GENERAL: PAINLEVEL_OUTOF10: 0

## 2024-02-11 NOTE — BH NOTE
Chief Complaint:  \"I think I'm constipated.\"    Length of Stay: 1 Days    Interval History:  Nursing report patient has been pleasant, slept well and is engaged in the evaluation. She slept well overnight.  She saw her boyfriend and feels supported.  Says that at this time suicidal thoughts are fading in the background.    Past Medical History:  No past medical history on file.        Labs:  Lab Results   Component Value Date/Time    WBC 6.9 02/09/2024 02:51 AM    HGB 13.5 02/09/2024 02:51 AM    HCT 38.2 02/09/2024 02:51 AM     02/09/2024 02:51 AM    MCV 94.3 02/09/2024 02:51 AM      Lab Results   Component Value Date/Time     02/09/2024 02:51 AM    K 3.8 02/09/2024 02:51 AM     02/09/2024 02:51 AM    CO2 27 02/09/2024 02:51 AM    BUN 12 02/09/2024 02:51 AM    GLOB 3.1 02/07/2024 04:31 AM    ALT 19 02/07/2024 04:31 AM      Current Facility-Administered Medications   Medication Dose Route Frequency Provider Last Rate Last Admin    acetaminophen (TYLENOL) tablet 650 mg  650 mg Oral Q4H PRN Miryam Ayala APRN - CNP        polyethylene glycol (GLYCOLAX) packet 17 g  17 g Oral Daily PRN Miryam Ayala APRN - CNP        senna (SENOKOT) tablet 8.6 mg  1 tablet Oral Daily PRN Miryam Ayala APRN - CNP   8.6 mg at 02/10/24 0918    aluminum & magnesium hydroxide-simethicone (MAALOX) 200-200-20 MG/5ML suspension 30 mL  30 mL Oral Q6H PRN Miryam Ayala APRN - CNP        hydrOXYzine HCl (ATARAX) tablet 50 mg  50 mg Oral TID PRN Miryam Ayala APRN - CNP   50 mg at 02/11/24 1245    haloperidol (HALDOL) tablet 5 mg  5 mg Oral Q4H PRN Miryam Ayala APRN - CNP        Or    haloperidol lactate (HALDOL) injection 5 mg  5 mg IntraMUSCular Q4H PRN Miryam Ayala APRN - CNP        diphenhydrAMINE (BENADRYL) injection 50 mg  50 mg IntraMUSCular Q4H PRN Miryam Ayaal APRN - CNP        traZODone (DESYREL) tablet 50 mg  50 mg Oral Nightly PRN Miryam Ayala APRN - CNP   50 mg at 02/10/24 2120

## 2024-02-11 NOTE — BH NOTE
PRN Medication Documentation    Specific patient behavior that led to need for PRN medication: pacing hallway, reports increased anxiety  Staff interventions attempted prior to PRN being given: verbal support  PRN medication given: atarax  Patient response/effectiveness of PRN medication:effective

## 2024-02-11 NOTE — PLAN OF CARE
Problem: Discharge Planning  Goal: Discharge to home or other facility with appropriate resources  Outcome: Progressing  Flowsheets (Taken 2/10/2024 2328)  Discharge to home or other facility with appropriate resources:   Identify discharge learning needs (meds, wound care, etc)   Identify barriers to discharge with patient and caregiver     Problem: Anxiety  Goal: Will report anxiety at manageable levels  Description: INTERVENTIONS:  1. Administer medication as ordered  2. Teach and rehearse alternative coping skills  3. Provide emotional support with 1:1 interaction with staff  2/10/2024 2328 by Caleb Calvo, RN  Outcome: Progressing  Flowsheets (Taken 2/10/2024 2328)  Will report anxiety at manageable levels:   Administer medication as ordered   Teach and rehearse alternative coping skills      [facility] Surgery Consultation    Nabil Cheung MRN# [id number]   Age: 64 year old YOB: 1956     Date of Admission:  1/8/2021    Date of Consult:   1/08/21    Reason for consult: Concern for sternal infection       Requesting service: CVTS                   Assessment and Plan:   Assessment:   65 yo with PMH of DM2, hypertension, CAD s/p CABG x 4 (LIMA to LAD, SVG to rPDA, SVG to OM, SVG to diag) s/p infection and reconstruction with left pec major muscle flap on 6/18/2020 here with concern for sternal osteomyelitis on imaging. No evidence of clinical infection without surrounding cellulitis or induration. WBC and CRP are both WNL. Favor CT findings representative of post-operative changes than infection.        Plan:   - No clinical evidence of sternal infection, WBC 9.3 and CRP <2.9  - Imaging findings without other evidence of infection likely represents routine post-operative anatomy rather than sternal osteomyelitis  - Antibiotics per primary team as indicated for infected foot ulcer  - Plastics will sign off, please call with any additional questions or concerns    Discussed with staff, Dr. Serna            Chief Complaint:   Concern for sternal osteomyelitis         History of Present Illness:   65 yo male with PMH of DM2, HTN, CKD stage 2-3, BPH/urinary retention, chronic diabetic left foot ulcer, and CAD s/p CABG x 4 (LIMA to LAD, SVG to rPDA, SVG to OM, SVG to diag) 05/04/2020 with Dr. Mckinney c/b sternal wound infection (MRSE), ultimately with pec flap closure with Plastic Surgery (Dr. Serna) on 06/18/2020, here with concerns for sternal osteomyelitis on CT chest. Developed a pimple on 1/2 over chest incision that popped and drained some purulent material. This has since healed. No pain or induration around area. No fever/chills or any systemic symptoms of infection. No other concerns.           Past Medical History:   As above          Past Surgical History:     Past Surgical  History:   Procedure Laterality Date     GRAFT FLAP PEDICLE TRAM DELAY PROCEDURE Left 6/18/2020    Procedure: Debridement of sternal wound and left major pectoral flep;  Surgeon: MARCO Serna MD;  Location: [facility] OR     INCISION AND DRAINAGE CHEST WASHOUT, COMBINED N/A 6/12/2020    Procedure: INCISION AND DRAINAGE, WOUND, CHEST, WITH IRRIGATION and wound vac change;  Surgeon: Mark Mckinney MD;  Location: [facility] OR     REPAIR CHEST WALL N/A 6/18/2020    Procedure: Chest wall reconstruction;  Surgeon: MARCO Serna MD;  Location: [facility] OR             Social History:     Social History     Tobacco Use     Smoking status: Never Smoker     Smokeless tobacco: Never Used   Substance Use Topics     Alcohol use: Yes             Family History:   No family history on file.             Allergies:     Allergies   Allergen Reactions     Atorvastatin      Dust Mites              Medications:     Current Facility-Administered Medications   Medication     acetaminophen (TYLENOL) tablet 650 mg     alfuzosin ER (UROXATRAL) 24 hr tablet 10 mg     aspirin (ASA) EC tablet 325 mg     cyclobenzaprine (FLEXERIL) tablet 10 mg     glucose gel 15-30 g    Or     dextrose 50 % injection 25-50 mL    Or     glucagon injection 1 mg     finasteride (PROSCAR) tablet 5 mg     insulin aspart (NovoLOG) injection (RAPID ACTING)     insulin aspart (NovoLOG) injection (RAPID ACTING)     insulin glargine (LANTUS PEN) injection 18 Units     lidocaine (LMX4) cream     lidocaine 1 % 0.1-1 mL     lisinopril (ZESTRIL) tablet 2.5 mg     metFORMIN (GLUCOPHAGE) tablet 1,000 mg     metoprolol succinate ER (TOPROL-XL) 24 hr tablet 50 mg     piperacillin-tazobactam (ZOSYN) 3.375 g vial to attach to  mL bag     rosuvastatin (CRESTOR) tablet 20 mg     sodium chloride (PF) 0.9% PF flush 3 mL     sodium chloride (PF) 0.9% PF flush 3 mL     vancomycin 1250 mg in 0.9% NaCl 250 mL intermittent infusion 1,250 mg               Review of Systems:   ROS  otherwise negative          Physical Exam:   All vitals have been reviewed  Temp:  [96.8  F (36  C)-98.8  F (37.1  C)] 98.8  F (37.1  C)  Pulse:  [57-84] 71  Resp:  [16] 16  BP: (118-168)/(61-84) 147/72  SpO2:  [96 %-100 %] 98 %    Intake/Output Summary (Last 24 hours) at 1/9/2021 1240  Last data filed at 1/9/2021 1125  Gross per 24 hour   Intake 1280 ml   Output 2250 ml   Net -970 ml     Physical Exam:  Gen: NAD, AOx3  Chest: NLB on RA, RRR. Midline incision well healed. No surrounding erythema/induration. No pain or tenderness with palpation of surrounding tissues.  Ext: WWP.                  Data:   All laboratory data reviewed    Results:  BMP  Recent Labs   Lab 01/09/21  0625 01/08/21  1031    137   POTASSIUM 5.2 4.5   CHLORIDE 112* 107   CO2 25 22   BUN 22 28   CR 1.36* 1.29*   * 233*     CBC  Recent Labs   Lab 01/09/21  0625 01/08/21  1031   WBC 9.3 9.5   HGB 11.5* 12.8*    273     LFT  Recent Labs   Lab 01/08/21  1031   AST 15   ALT 29   ALKPHOS 68   BILITOTAL 0.3   ALBUMIN 3.6   INR 1.05     Recent Labs   Lab 01/09/21  0625 01/09/21  0318 01/08/21  2110 01/08/21  1031   *  --   --  233*   BGM  --  86 154*  --        Imaging:  Reviewed CT form [facility] in PACS. Favor post-operative changes over sternal osteomyelitis.    Rahul Mejia MD   Surgery PGY-5  Please Page On-Call on [employer]

## 2024-02-11 NOTE — PLAN OF CARE
Problem: Depression  Goal: Will be euthymic at discharge  Description: INTERVENTIONS:  1. Administer medication as ordered  2. Provide emotional support via 1:1 interaction with staff  3. Encourage involvement in milieu/groups/activities  4. Monitor for social isolation  2/11/2024 1547 by Daysi Rios RN  Outcome: Progressing     Problem: Anxiety  Goal: Will report anxiety at manageable levels  Description: INTERVENTIONS:  1. Administer medication as ordered  2. Teach and rehearse alternative coping skills  3. Provide emotional support with 1:1 interaction with staff  Outcome: Progressing  Flowsheets  Taken 2/11/2024 1545  Will report anxiety at manageable levels: Administer medication as ordered  Taken 2/11/2024 0749  Will report anxiety at manageable levels: Administer medication as ordered

## 2024-02-11 NOTE — PLAN OF CARE
Problem: Depression  Goal: Will be euthymic at discharge  Description: INTERVENTIONS:  1. Administer medication as ordered  2. Provide emotional support via 1:1 interaction with staff  3. Encourage involvement in milieu/groups/activities  4. Monitor for social isolation  Outcome: Progressing  Note: Pt out on the unit for small periods of time. Complaining of feeling dizzy during the shift. Continues to feel depressed. Taking scheduled medications. Encouraged pt to participate on the unit.

## 2024-02-12 PROCEDURE — 6370000000 HC RX 637 (ALT 250 FOR IP): Performed by: PSYCHIATRY & NEUROLOGY

## 2024-02-12 PROCEDURE — 6370000000 HC RX 637 (ALT 250 FOR IP)

## 2024-02-12 PROCEDURE — 1240000000 HC EMOTIONAL WELLNESS R&B

## 2024-02-12 RX ORDER — LURASIDONE HYDROCHLORIDE 20 MG/1
40 TABLET, FILM COATED ORAL
Status: DISCONTINUED | OUTPATIENT
Start: 2024-02-12 | End: 2024-02-16 | Stop reason: HOSPADM

## 2024-02-12 RX ADMIN — HALOPERIDOL 5 MG: 5 TABLET ORAL at 17:33

## 2024-02-12 RX ADMIN — HYDROXYZINE HYDROCHLORIDE 50 MG: 50 TABLET, FILM COATED ORAL at 12:04

## 2024-02-12 RX ADMIN — Medication 100 MG: at 09:13

## 2024-02-12 RX ADMIN — Medication 1 MG: at 09:13

## 2024-02-12 RX ADMIN — VALACYCLOVIR HYDROCHLORIDE 500 MG: 500 TABLET, FILM COATED ORAL at 09:13

## 2024-02-12 RX ADMIN — LURASIDONE HYDROCHLORIDE 40 MG: 20 TABLET ORAL at 16:34

## 2024-02-12 RX ADMIN — THERA TABS 1 TABLET: TAB at 09:13

## 2024-02-12 NOTE — BH NOTE
PSYCHOSOCIAL ASSESSMENT  :Patient identifying info:   Deepti Ayala is a 48 y.o., female admitted 2/10/2024 12:44 AM     Presenting problem and precipitating factors: 48 year old female admitted from [facility] medical floor following an intentional overdose of Wellbutrin, Lamictal and Clonazepam. Patient reports recent work stressors and financial stressors. Patient reports excessive sleeping to avoid stressors. Patient endorses hx of self-harming. Patient has access to firearms in home. Patient denies HI and AHVH.    Mental status assessment: AOX4, depressed, hopeless, fair historian, demonstrates some insight into lack of coping skills    Strengths/Recreation/Coping Skills: Voluntary, insured, stable housing, family support    Collateral information: Boyfriend    Current psychiatric /substance abuse providers and contact info: [facility]    Previous psychiatric/substance abuse providers and response to treatment: 4 past inpatient admissions, last 5 years ago    Family history of mental illness or substance abuse: None stated    Substance abuse history:  BAL 56, UDS+ Amphetamines  Social History     Tobacco Use    Smoking status: Former     Current packs/day: 0.00     Types: Cigarettes     Quit date: 2017     Years since quittin.1    Smokeless tobacco: Not on file   Substance Use Topics    Alcohol use: Yes       History of biomedical complications associated with substance abuse: None stated    Patient's current acceptance of treatment or motivation for change: Voluntary    Family constellation: Patient is in a relationship and has one 19 year old son diagnosed with ASD, boyfriend has 2 sons as well    Is significant other involved? Yes    Describe support system: Fair family support, some community support    Describe living arrangements and home environment: Lives with parents    GUARDIAN/POA: No    Guardian Name: None    Guardian Contact: None    Health issues: No past medical history on  file.    Trauma history: Yes    Legal issues: No pending legal charges    History of  service: None stated    Financial status: Owns  business    Confucianism/cultural factors: None stated    Education/work history: Achieved masters level of education in criminal justice    Have you been licensed as a health care professional (current or ): No    Describe coping skills: Limited, ineffective    VEENA Ascencio  2024

## 2024-02-12 NOTE — PLAN OF CARE
Problem: Depression  Goal: Will be euthymic at discharge  Description: INTERVENTIONS:  1. Administer medication as ordered  2. Provide emotional support via 1:1 interaction with staff  3. Encourage involvement in milieu/groups/activities  4. Monitor for social isolation  2/12/2024 1103 by Yvette Matt RN  Outcome: Progressing  Note: Out on unit engaged social w peer and staff. Affect bright full range. Describes mood as feeling hopeful but at times feeling labile with guilt and shame related to her overdose. Clarifies that she feels guilty that she was not successful and that her family have to manage her affairs. Continues to describes hopelessness and depressed mood. States only protective factor is her autistic son. During tx team discussed stopping welbutrin and adderall can lead to sudden mood shifts. Staff focus is on offering support and reassurance.

## 2024-02-12 NOTE — BH NOTE
PRN Medication Documentation    Specific patient behavior that led to need for PRN medication: pt requested for c/o increased anxiety   Staff interventions attempted prior to PRN being given: education, coping skills   PRN medication given: Atarax 50mg PO given @ 1204  Patient response/effectiveness of PRN medication: will monitor and provide support as needed

## 2024-02-12 NOTE — PLAN OF CARE
Problem: Safety - Adult  Goal: Free from fall injury  Outcome: Progressing     Pt appears to be resting in bed in no apparent distress, respirations even and unlabored. No voiced concerns. Standard fall precautions maintained. Q15m rounds for safety continued per provider order.

## 2024-02-12 NOTE — PROGRESS NOTES
Laboratory Monitoring for Antipsychotics:    This patient is currently prescribed the following medication(s):   Current Facility-Administered Medications: lurasidone (LATUDA) tablet 40 mg, 40 mg, Oral, Dinner  bisacodyl (DULCOLAX) suppository 10 mg, 10 mg, Rectal, Daily PRN  acetaminophen (TYLENOL) tablet 650 mg, 650 mg, Oral, Q4H PRN  polyethylene glycol (GLYCOLAX) packet 17 g, 17 g, Oral, Daily PRN  senna (SENOKOT) tablet 8.6 mg, 1 tablet, Oral, Daily PRN  aluminum & magnesium hydroxide-simethicone (MAALOX) 200-200-20 MG/5ML suspension 30 mL, 30 mL, Oral, Q6H PRN  hydrOXYzine HCl (ATARAX) tablet 50 mg, 50 mg, Oral, TID PRN  haloperidol (HALDOL) tablet 5 mg, 5 mg, Oral, Q4H PRN **OR** haloperidol lactate (HALDOL) injection 5 mg, 5 mg, IntraMUSCular, Q4H PRN  diphenhydrAMINE (BENADRYL) injection 50 mg, 50 mg, IntraMUSCular, Q4H PRN  traZODone (DESYREL) tablet 50 mg, 50 mg, Oral, Nightly PRN  thiamine tablet 100 mg, 100 mg, Oral, Daily  folic acid (FOLVITE) tablet 1 mg, 1 mg, Oral, Daily  multivitamin 1 tablet, 1 tablet, Oral, Daily  valACYclovir (VALTREX) tablet 500 mg, 500 mg, Oral, Daily    The following labs have been completed for monitoring of antipsychotics and/or mood stabilizers:    Height, Weight, BMI Estimation  Estimated body mass index is 24.82 kg/m² as calculated from the following:    Height as of this encounter: 1.6 m (5' 3\").    Weight as of this encounter: 63.5 kg (140 lb 1.6 oz).     Vital Signs/Blood Pressure  /61   Pulse 72   Temp 97.9 °F (36.6 °C) (Oral)   Resp 16   Ht 1.6 m (5' 3\")   Wt 63.5 kg (140 lb 1.6 oz)   SpO2 100%   BMI 24.82 kg/m²     Renal Function, Hepatic Function and Chemistry  Estimated Creatinine Clearance: 79 mL/min (based on SCr of 0.8 mg/dL).    Lab Results   Component Value Date/Time     02/09/2024 02:51 AM    K 3.8 02/09/2024 02:51 AM     02/09/2024 02:51 AM    CO2 27 02/09/2024 02:51 AM    BUN 12 02/09/2024 02:51 AM    GLOB 3.1 02/07/2024 04:31 AM     ALT 19 02/07/2024 04:31 AM    AST 15 02/07/2024 04:31 AM       Glucose/Hemoglobin A1c  No results found for: \"GLU\", \"GLUCPOC\"  No results found for: \"LABA1C\", \"OBV8SRMB\", \"EAG\"    Hematology  Lab Results   Component Value Date/Time    WBC 6.9 02/09/2024 02:51 AM    RBC 4.05 02/09/2024 02:51 AM    HGB 13.5 02/09/2024 02:51 AM    HCT 38.2 02/09/2024 02:51 AM    MCV 94.3 02/09/2024 02:51 AM    MCH 33.3 02/09/2024 02:51 AM    MCHC 35.3 02/09/2024 02:51 AM    RDW 11.8 02/09/2024 02:51 AM     02/09/2024 02:51 AM     Lipids  No results found for: \"CHOL\", \"TRIG\", \"HDL\", \"LDLCALC\", \"CHOLHDLRATIO\"    Thyroid Function  No results found for: \"TSH\", \"TSH2\", \"TSH3\", \"TSHELE\", \"T3RIA\", \"T3UP\", \"FT3\", \"FT4\", \"FT4P\", \"T4\", \"T4P\", \"FT4T\", \"TT7\"    Pregnancy Status  No results found for: \"HCGUQC\", \"PREGU\", \"HCGQR\", \"THCGA1\", \"PREGTESTUR\"    Assessment/Plan:  Will order lipid panel and hemoglobin A1c or fasting glucose to complete the recommended baseline laboratory monitoring based on the patient's current medication regimen.        Katy Reis [facility]

## 2024-02-12 NOTE — INTERDISCIPLINARY ROUNDS
Behavioral Health Interdisciplinary Rounds     Patient Name: Deepti Ayala  Age: 48 y.o.  Room/Bed:  [facility]  Primary Diagnosis: Depression   Admission Status: Voluntary    Readmission within 30 days: No  Power of  in place: No  Patient requires a blocked bed: No          Reason for blocked bed:   Sleep hours: 7+       Participation in Care/Groups:  Yes  Medication Compliant?: Yes  PRNS (last 24 hours): Antianxiety and Sleep Aid   Restraints (last 24 hours):  No  __________________________________________________  OQ Admission Analysis Survey completed:  OQ Admission Analysis Survey score:  __________________________________________________     Alcohol screening (AUDIT) completed -yes  AUDIT Screen Score:  4      Document Brief Intervention (corresponds directly with the 5 A's, Ask, Advise, Assess, Assist, and Arrange):      At- Risk Patients (Score 7-15 for women; 8-15 for men)  Discuss concern patient is drinking at unhealthy levels known to increase risk of alcohol-related health problems.        Tobacco - patient is a smoker:    Illegal Drugs use:  no    24 hour chart check complete: Yes    _______________________________________________    Patient goal(s) for today: meet with treatment team   Treatment team focus/goals: Plan to titrate her medications  Progress note: She was very tearful in treatment team,  still having SI - still feels hopeless -      Financial concerns/prescription coverage:  [facility]   Family contact: She sign a MELISSA for her mother -   Dolores Mejia -[phone]                      Family requesting physician contact today:    Discharge plan: she will return home   Access to weapons :  Patient has access to firearms in home.                                                             Outpatient provider(s): [facility]- consider PHP and she needs a therapist      LOS:  2  Expected LOS: TBD    Participating treatment team members: Deepti Ayala, NARCISO Muñoz Dr.

## 2024-02-12 NOTE — BH NOTE
Chief Complaint:  \"I think I'm constipated.\"    Length of Stay: 2 Days    Interval History:  02/12/2024  Nursing report patient is pleasant, adherent to medications and meals.  This morning she reports feeling quite hopeless. She is upset that her suicide attempt didn't work.  Deepti is tearful and sad.  Evaluation is positive for passive death wish.      02/11/2024  Nursing report patient has been pleasant, slept well and is engaged in the evaluation. She slept well overnight.  She saw her boyfriend and feels supported.  Says that at this time suicidal thoughts are fading in the background.    Past Medical History:  No past medical history on file.        Labs:  Lab Results   Component Value Date/Time    WBC 6.9 02/09/2024 02:51 AM    HGB 13.5 02/09/2024 02:51 AM    HCT 38.2 02/09/2024 02:51 AM     02/09/2024 02:51 AM    MCV 94.3 02/09/2024 02:51 AM      Lab Results   Component Value Date/Time     02/09/2024 02:51 AM    K 3.8 02/09/2024 02:51 AM     02/09/2024 02:51 AM    CO2 27 02/09/2024 02:51 AM    BUN 12 02/09/2024 02:51 AM    GLOB 3.1 02/07/2024 04:31 AM    ALT 19 02/07/2024 04:31 AM      Current Facility-Administered Medications   Medication Dose Route Frequency Provider Last Rate Last Admin    bisacodyl (DULCOLAX) suppository 10 mg  10 mg Rectal Daily PRN Briana Villalobos MD        acetaminophen (TYLENOL) tablet 650 mg  650 mg Oral Q4H PRN Miryam Ayala APRN - CNP        polyethylene glycol (GLYCOLAX) packet 17 g  17 g Oral Daily PRN Miryam Ayala APRN - CNP        senna (SENOKOT) tablet 8.6 mg  1 tablet Oral Daily PRN Miryam Ayala APRN - CNP   8.6 mg at 02/10/24 0918    aluminum & magnesium hydroxide-simethicone (MAALOX) 200-200-20 MG/5ML suspension 30 mL  30 mL Oral Q6H PRN Miryam Ayala APRN - CNP        hydrOXYzine HCl (ATARAX) tablet 50 mg  50 mg Oral TID PRN Miryam Ayala APRN - CNP   50 mg at 02/11/24 1245    haloperidol (HALDOL) tablet 5 mg  5 mg Oral Q4H PRN  family members; Complete current electronic health record for patient was reviewed in full including consultant notes, ancillary staff notes, nurses and tech notes, labs and vitals.    I certify that this patients inpatient psychiatric hospital services furnished since the previous certification were, and continue to be, required for treatment that could reasonably be expected to improve the patient's condition, or for diagnostic study, and that the patient continues to need, on a daily basis, active treatment furnished directly by or requiring the supervision of inpatient psychiatric facility personnel. In addition, the hospital records show that services furnished were intensive treatment services, admission or related services, or equivalent services.

## 2024-02-12 NOTE — PLAN OF CARE
Problem: Depression  Goal: Will be euthymic at discharge  Description: INTERVENTIONS:  1. Administer medication as ordered  2. Provide emotional support via 1:1 interaction with staff  3. Encourage involvement in milieu/groups/activities  2/11/2024 2204 by Unique Quijano RN  Outcome: Progressing  Outcome: Progressing  Problem: Anxiety  Goal: Will report anxiety at manageable levels  Description: INTERVENTIONS:  1. Administer medication as ordered  2. Teach and rehearse alternative coping skills  3. Provide emotional support with 1:1 interaction with staff  2/11/2024 2204 by Unique Quijano RN  Outcome: Progressing

## 2024-02-12 NOTE — PLAN OF CARE
Problem: Depression  Goal: Will be euthymic at discharge  Description: INTERVENTIONS:  1. Administer medication as ordered  2. Provide emotional support via 1:1 interaction with staff  3. Encourage involvement in milieu/groups/activities  4. Monitor for social isolation  2/12/2024 1543 by Laura Vail, RN  Outcome: Progressing     Problem: Anxiety  Goal: Will report anxiety at manageable levels  Description: INTERVENTIONS:  1. Administer medication as ordered  2. Teach and rehearse alternative coping skills  3. Provide emotional support with 1:1 interaction with staff  Outcome: Progressing     Problem: Safety - Adult  Goal: Free from fall injury  Outcome: Progressing   Pt out on unit passively engaged with peers. Denies current SI/HI/AVH. Remains medication and meal compliant. Will continue to monitor q15 minutes for safety.

## 2024-02-12 NOTE — BH NOTE
PRN Medication Documentation    Specific patient behavior that led to need for PRN medication: Agitation, pacing   Staff interventions attempted prior to PRN being given: Medication   PRN medication given: Haldol   Patient response/effectiveness of PRN medication: Will continue to monitor the effectiveness

## 2024-02-13 PROCEDURE — 6370000000 HC RX 637 (ALT 250 FOR IP): Performed by: PSYCHIATRY & NEUROLOGY

## 2024-02-13 PROCEDURE — 6370000000 HC RX 637 (ALT 250 FOR IP)

## 2024-02-13 PROCEDURE — 1240000000 HC EMOTIONAL WELLNESS R&B

## 2024-02-13 RX ADMIN — VALACYCLOVIR HYDROCHLORIDE 500 MG: 500 TABLET, FILM COATED ORAL at 09:10

## 2024-02-13 RX ADMIN — TRAZODONE HYDROCHLORIDE 50 MG: 50 TABLET ORAL at 20:43

## 2024-02-13 RX ADMIN — HYDROXYZINE HYDROCHLORIDE 50 MG: 50 TABLET, FILM COATED ORAL at 18:32

## 2024-02-13 RX ADMIN — LURASIDONE HYDROCHLORIDE 40 MG: 20 TABLET ORAL at 16:49

## 2024-02-13 RX ADMIN — HYDROXYZINE HYDROCHLORIDE 50 MG: 50 TABLET, FILM COATED ORAL at 09:10

## 2024-02-13 RX ADMIN — THERA TABS 1 TABLET: TAB at 09:09

## 2024-02-13 RX ADMIN — Medication 1 MG: at 09:10

## 2024-02-13 RX ADMIN — Medication 100 MG: at 09:09

## 2024-02-13 NOTE — BH NOTE
AM lab draw unsuccessful to the R hand. Patient tolerated attempt well, states \"I'm fine, just want to go back to sleep\".    Will retime labs.

## 2024-02-13 NOTE — BH NOTE
GROUP THERAPY PROGRESS NOTE    Patient is participating in Safety Planning group.    Group time: 15 minutes    Personal goal for participation: To complete safety plan     Goal orientation: Personal    Group therapy participation: Passive      Therapeutic interventions reviewed and discussed:  Group members were supported in filling out safety plans. Pts are able to develop and document a strategy to remain safe after discharge. SW provided feedback about questions/concerns of pts. Pts returned safety plans when completed.     Impression of participation:  SW provided safety plan to pt to be completed independently.    Katherine Gillies, MSW, HP-A

## 2024-02-13 NOTE — PLAN OF CARE
Problem: Depression  Goal: Will be euthymic at discharge  Description: INTERVENTIONS:  1. Administer medication as ordered  2. Provide emotional support via 1:1 interaction with staff  3. Encourage involvement in milieu/groups/activities  4. Monitor for social isolation  2/13/2024 1638 by Laura Vail, RN  Outcome: Progressing     Problem: Anxiety  Goal: Will report anxiety at manageable levels  Description: INTERVENTIONS:  1. Administer medication as ordered  2. Teach and rehearse alternative coping skills  3. Provide emotional support with 1:1 interaction with staff  2/13/2024 1638 by Laura Vail, RN  Outcome: Progressing     Problem: Safety - Adult  Goal: Free from fall injury  Outcome: Progressing   Pt out on unit passively engaged with peers. Denies current SI/HI/AVH. Remains medication and meal compliant. Will continue to monitor q15 minutes for safety.

## 2024-02-13 NOTE — BH NOTE
GROUP THERAPY PROGRESS NOTE  Activity: BABAK   Pt did not participate in group led by nursing students.  Katherine Gillies, MSW, HP-A

## 2024-02-13 NOTE — INTERDISCIPLINARY ROUNDS
Behavioral Health Interdisciplinary Rounds     Patient Name: Deepti Ayala  Age: 48 y.o.  Room/Bed:  [facility]/  Primary Diagnosis: Depression   Admission Status: Voluntary    Readmission within 30 days: No  Power of  in place: No  Patient requires a blocked bed: No          Reason for blocked bed:   Sleep hours: 7       Participation in Care/Groups:  Yes  Medication Compliant?: Yes  PRNS (last 24 hours): Antipsychotic (PO) and Antianxiety   Restraints (last 24 hours):  No  ___________________________________    24 hour chart check complete: Yes    ___________________________________    Patient goal(s) for today: meet with treatment team   Treatment team focus/goals: Plan to continues to titrate her medications   Progress note: reports she is feeling better today and has been up and out of her room and participating      Spiritual Care Consult:   Financial concerns/prescription coverage:  [facility]   Family contact: Parents                        Family requesting physician contact today:  no  Discharge plan: she will return home with   Access to weapons: no  Outpatient provider(s): consider PHP       LOS:  3  Expected LOS: TBD     Participating treatment team members: Deepti NIKA Jamie, DEBRA Muñoz- Dr. Griselda Reis,PharmD.  Abena Cabrrea RN

## 2024-02-13 NOTE — BH NOTE
GROUP THERAPY PROGRESS NOTE    Patient is participating in Self-care group.    Group time: 30 minutes    Personal goal for participation: To gain an understanding of the importance of self-awareness.    Goal orientation: Personal    Group therapy participation: passive     Therapeutic interventions reviewed and discussed:  Group members were guided through developing an understanding of how self-awareness contributes to our ability to cope with life stressors. Members given the opportunity to complete self-awareness assessment.     Impression of participation: SW provided handouts for pts to complete independently.     Katherine Gillies, MSW, [facility]

## 2024-02-13 NOTE — BH NOTE
GROUP THERAPY PROGRESS NOTE    Patient is participating in psychoeducation group.    Group time: 30 mins.    Personal goal for participation: to develop an understanding of cognitive distortions and how to alter our thinking.     Goal orientation: Personal    Group therapy participation:  Active     Therapeutic interventions reviewed and discussed:  Group members were guided through learning about cognitive distortions through discussion and video. Members gained an understanding of how these types of distortions effect perception, relationships, and overall mental health. Members were guided through learning about methods that can be used for changing negative thought patterns. Handouts provided.    Impression of participation:  Pt was present and engaged in group discussion. Pt added insight to topic. Pt was calm, cooperative.     Katherine Gillies, MSW, [facility]

## 2024-02-13 NOTE — PLAN OF CARE
Problem: Depression  Goal: Will be euthymic at discharge  Description: INTERVENTIONS:  1. Administer medication as ordered  2. Provide emotional support via 1:1 interaction with staff  3. Encourage involvement in milieu/groups/activities  4. Monitor for social isolation  Outcome: Progressing     Problem: Anxiety  Goal: Will report anxiety at manageable levels  Description: INTERVENTIONS:  1. Administer medication as ordered  2. Teach and rehearse alternative coping skills  3. Provide emotional support with 1:1 interaction with staff  Outcome: Progressing    Patient seen in treatment team,  discussed medication changes.  Patient states \"I don' care one way or another\".  She states she is having a hard time recognizing her feelings.   Patient awake and alert and present in the milieu.   Interacts well with staff and peers.   Med and meal compliant.    Patient denies SI at this time.  Will continue to monitor q15 minutes for safety checks.

## 2024-02-13 NOTE — PLAN OF CARE
Problem: Safety - Adult  Goal: Free from fall injury  2/13/2024 0007 by Michell Smith RN  Outcome: Progressing   Patient received resting quietly in bed. No signs of distress. Even and unlabored breathing. Staff will continue to monitor safety q15 and provide support.

## 2024-02-13 NOTE — DISCHARGE INSTRUCTIONS
DISCHARGE SUMMARY    NAME:Deepti Ayala  : 1975  MRN: [id number]    The patient Deepti Ayala exhibits the ability to control behavior in a less restrictive environment.  Patient's level of functioning is improving.  No assaultive/destructive behavior has been observed for the past 24 hours.  No suicidal/homicidal threat or behavior has been observed for the past 24 hours.  There is no evidence of serious medication side effects.  Patient has not been in physical or protective restraints for at least the past 24 hours.    If weapons involved, how are they secured? Sw spoke to her mom on 2/15/24 - weapons at home will be secured     Is patient aware of and in agreement with discharge plan? She is aware of discharge and is in agreement.    Arrangements for medication:  Prescriptions sent to your pharmacy.  7 days and 3 refills     Copy of discharge instructions to provider?:  yes, PHP at [facility] [phone]     Arrangements for transportation home: parents to pickup     Keep all follow up appointments as scheduled, continue to take prescribed medications per physician instructions.  Mental health crisis number:  911 or your local mental health crisis line number at [phone]      Mental Health Emergency WARM LINE      [phone] ([phone])      M-F: 9am to 9pm      Sat & Sun: 5pm - 9pm  National suicide prevention lines:                             1-656-DZPSJJU (1-933-394-8251)       2-509-809-TALK ([phone])    Crisis Text Line:  Text HOME to 277963      DISCHARGE SUMMARY from Nurse    PATIENT INSTRUCTIONS:    What to do at Home:  Recommended activity: activity as tolerated,     If you experience any of the following symptoms : intense anxiety and or hopelessness that leads to suicidal thoughts or urges to harm self - talk with staff at [facility] PHP at [phone]    *  Please give a list of your current medications to your Primary Care Provider.    *  Please update this list whenever

## 2024-02-13 NOTE — BH NOTE
Chief Complaint:  \"I think I'm constipated.\"    Length of Stay: 3 Days    Interval History:  02/13/2024  Patient reports that she slept. However, she says that she is having a hard concentrating or focusing.  She says that she is ambivalent about whether she lives or dies.  Denies any plans or intents to harm or end her life.    02/12/2024  Nursing report patient is pleasant, adherent to medications and meals.  This morning she reports feeling quite hopeless. She is upset that her suicide attempt didn't work.  Deepti is tearful and sad.  Evaluation is positive for passive death wish.      02/11/2024  Nursing report patient has been pleasant, slept well and is engaged in the evaluation. She slept well overnight.  She saw her boyfriend and feels supported.  Says that at this time suicidal thoughts are fading in the background.    Past Medical History:  No past medical history on file.        Labs:  Lab Results   Component Value Date/Time    WBC 6.9 02/09/2024 02:51 AM    HGB 13.5 02/09/2024 02:51 AM    HCT 38.2 02/09/2024 02:51 AM     02/09/2024 02:51 AM    MCV 94.3 02/09/2024 02:51 AM      Lab Results   Component Value Date/Time     02/09/2024 02:51 AM    K 3.8 02/09/2024 02:51 AM     02/09/2024 02:51 AM    CO2 27 02/09/2024 02:51 AM    BUN 12 02/09/2024 02:51 AM    GLOB 3.1 02/07/2024 04:31 AM    ALT 19 02/07/2024 04:31 AM      Current Facility-Administered Medications   Medication Dose Route Frequency Provider Last Rate Last Admin    lurasidone (LATUDA) tablet 40 mg  40 mg Oral Dinner Briana Villalobos MD   40 mg at 02/12/24 1634    bisacodyl (DULCOLAX) suppository 10 mg  10 mg Rectal Daily PRN Briana Villalobos MD        acetaminophen (TYLENOL) tablet 650 mg  650 mg Oral Q4H PRN Miryam Ayala APRN - CAMRYN        polyethylene glycol (GLYCOLAX) packet 17 g  17 g Oral Daily PRN Miryam Ayala APRN - CNP        senna (SENOKOT) tablet 8.6 mg  1 tablet Oral Daily PRN Miryam Ayala APRN - CNP

## 2024-02-14 LAB
CHOLEST SERPL-MCNC: 239 MG/DL
EST. AVERAGE GLUCOSE BLD GHB EST-MCNC: 94 MG/DL
HBA1C MFR BLD: 4.9 % (ref 4–5.6)
HDLC SERPL-MCNC: 87 MG/DL
HDLC SERPL: 2.7 (ref 0–5)
LDLC SERPL CALC-MCNC: 141.6 MG/DL (ref 0–100)
TRIGL SERPL-MCNC: 52 MG/DL
VLDLC SERPL CALC-MCNC: 10.4 MG/DL

## 2024-02-14 PROCEDURE — 80061 LIPID PANEL: CPT

## 2024-02-14 PROCEDURE — 6370000000 HC RX 637 (ALT 250 FOR IP)

## 2024-02-14 PROCEDURE — 36415 COLL VENOUS BLD VENIPUNCTURE: CPT

## 2024-02-14 PROCEDURE — 1240000000 HC EMOTIONAL WELLNESS R&B

## 2024-02-14 PROCEDURE — 6370000000 HC RX 637 (ALT 250 FOR IP): Performed by: PSYCHIATRY & NEUROLOGY

## 2024-02-14 PROCEDURE — 83036 HEMOGLOBIN GLYCOSYLATED A1C: CPT

## 2024-02-14 RX ADMIN — THERA TABS 1 TABLET: TAB at 09:13

## 2024-02-14 RX ADMIN — TRAZODONE HYDROCHLORIDE 50 MG: 50 TABLET ORAL at 20:47

## 2024-02-14 RX ADMIN — Medication 100 MG: at 09:13

## 2024-02-14 RX ADMIN — Medication 1 MG: at 09:13

## 2024-02-14 RX ADMIN — HYDROXYZINE HYDROCHLORIDE 50 MG: 50 TABLET, FILM COATED ORAL at 12:49

## 2024-02-14 RX ADMIN — LURASIDONE HYDROCHLORIDE 40 MG: 20 TABLET ORAL at 17:14

## 2024-02-14 RX ADMIN — ACETAMINOPHEN 650 MG: 325 TABLET ORAL at 09:12

## 2024-02-14 RX ADMIN — HYDROXYZINE HYDROCHLORIDE 50 MG: 50 TABLET, FILM COATED ORAL at 18:45

## 2024-02-14 RX ADMIN — VALACYCLOVIR HYDROCHLORIDE 500 MG: 500 TABLET, FILM COATED ORAL at 09:12

## 2024-02-14 ASSESSMENT — PAIN DESCRIPTION - ONSET: ONSET: AWAKENED FROM SLEEP

## 2024-02-14 ASSESSMENT — PAIN DESCRIPTION - PAIN TYPE: TYPE: ACUTE PAIN

## 2024-02-14 ASSESSMENT — PAIN DESCRIPTION - DESCRIPTORS: DESCRIPTORS: ACHING

## 2024-02-14 ASSESSMENT — PAIN DESCRIPTION - ORIENTATION: ORIENTATION: ANTERIOR

## 2024-02-14 ASSESSMENT — PAIN SCALES - GENERAL
PAINLEVEL_OUTOF10: 3
PAINLEVEL_OUTOF10: 7

## 2024-02-14 ASSESSMENT — PAIN - FUNCTIONAL ASSESSMENT: PAIN_FUNCTIONAL_ASSESSMENT: ACTIVITIES ARE NOT PREVENTED

## 2024-02-14 ASSESSMENT — PAIN DESCRIPTION - FREQUENCY: FREQUENCY: CONTINUOUS

## 2024-02-14 ASSESSMENT — PAIN DESCRIPTION - LOCATION: LOCATION: HEAD

## 2024-02-14 NOTE — INTERDISCIPLINARY ROUNDS
Behavioral Health Interdisciplinary Rounds     Patient Name: Deepti Ayala  Age: 48 y.o.  Room/Bed:  [facility]  Primary Diagnosis: Depression   Admission Status: Voluntary    Readmission within 30 days: No  Power of  in place: No  Patient requires a blocked bed: No          Reason for blocked bed:   Sleep hours:       Participation in Care/Groups:  Yes  Medication Compliant?: Yes  PRNS (last 24 hours): atarax, trazodone   Restraints (last 24 hours):  No  __________________________________________________  OQ Admission Analysis Survey completed:  OQ Admission Analysis Survey score:  __________________________________________________     Alcohol screening (AUDIT) completed -     If applicable, date SBIRT discussed in treatment team AND documented:    Tobacco - patient is a smoker:    Illegal Drugs use:      24 hour chart check complete: Yes    _______________________________________________    Patient goal(s) for today:   Treatment team focus/goals:   Progress note:      Spiritual Care Consult:   Financial concerns/prescription coverage:    Family contact:                        Family requesting physician contact today:    Discharge plan:   Access to weapons :                                                              Outpatient provider(s):   Patient's preferred phone number for follow up call :   Patient's preferred e-mail address :    LOS:  4  Expected LOS:     Participating treatment team members: Deepti Ayala, * (assigned SW),

## 2024-02-14 NOTE — BH NOTE
Chief Complaint:  \"Maybe I'm seeing the landing strip!\"    Length of Stay: 4 Days    Interval History:  02/14/2024  Nursing report patient slept well. She says that she has a mild headache that tylenol helped with.  She feels that she is having a better day and feel supported, enjoyed the visit with her family last night.  Deepti feels that her anxiety is lifting. Today she reports feeling more hope and no suicidal thoughts this morning.  No AVH.    02/13/2024  Patient reports that she slept. However, she says that she is having a hard concentrating or focusing.  She says that she is ambivalent about whether she lives or dies.  Denies any plans or intents to harm or end her life.    02/12/2024  Nursing report patient is pleasant, adherent to medications and meals.  This morning she reports feeling quite hopeless. She is upset that her suicide attempt didn't work.  Deepti is tearful and sad.  Evaluation is positive for passive death wish.      02/11/2024  Nursing report patient has been pleasant, slept well and is engaged in the evaluation. She slept well overnight.  She saw her boyfriend and feels supported.  Says that at this time suicidal thoughts are fading in the background.    Past Medical History:  No past medical history on file.        Labs:  Lab Results   Component Value Date/Time    WBC 6.9 02/09/2024 02:51 AM    HGB 13.5 02/09/2024 02:51 AM    HCT 38.2 02/09/2024 02:51 AM     02/09/2024 02:51 AM    MCV 94.3 02/09/2024 02:51 AM      Lab Results   Component Value Date/Time     02/09/2024 02:51 AM    K 3.8 02/09/2024 02:51 AM     02/09/2024 02:51 AM    CO2 27 02/09/2024 02:51 AM    BUN 12 02/09/2024 02:51 AM    GLOB 3.1 02/07/2024 04:31 AM    ALT 19 02/07/2024 04:31 AM      Current Facility-Administered Medications   Medication Dose Route Frequency Provider Last Rate Last Admin    lurasidone (LATUDA) tablet 40 mg  40 mg Oral Dinner Briana Villalobos MD   40 mg at 02/13/24 6369

## 2024-02-14 NOTE — PLAN OF CARE
Problem: Anxiety  Goal: Will report anxiety at manageable levels  Description: INTERVENTIONS:  1. Administer medication as ordered  2. Teach and rehearse alternative coping skills  3. Provide emotional support with 1:1 interaction with staff  Outcome: Progressing     Problem: Depression  Goal: Will be euthymic at discharge  Description: INTERVENTIONS:  1. Administer medication as ordered  2. Provide emotional support via 1:1 interaction with staff  3. Encourage involvement in milieu/groups/activities  4. Monitor for social isolation  Outcome: Progressing

## 2024-02-14 NOTE — BH NOTE
GROUP THERAPY PROGRESS NOTE    Patient is participating in self-care group.    Group time: 30 minutes    Personal goal for participation: Develop an understanding of sleep hygiene    Goal orientation: Personal    Group therapy participation: Active      Therapeutic interventions reviewed and discussed: Group members were able to develop an understanding of how sleep patterns effect mental health. Members were guided through developing an understanding of sleep hygiene. Members gained insight through discussion about current maladaptive sleep habits and ways to improve sleep quality. Sleep hygiene guideline worksheet provided.    Impression of participation: Pt was present and engaged in group discussion. Pt added insight to group topic. Pt was calm, cooperative.      Katherine Gillies MSW, [facility]

## 2024-02-14 NOTE — BH NOTE
GROUP THERAPY PROGRESS NOTE    Patient is participating in psychoeducation group.    Group time: 60 minutes    Personal goal for participation: To complete a Needs assessment    Goal orientation: Personal    Group therapy participation: active     Therapeutic interventions reviewed and discussed:  Group members were guided through learning about Maslow's Hierarchy of needs and the importance of self-awareness and being able to identify needs. Members were asked to complete a needs assessment. Members participated in meaningful discussion. Handouts provided    Impression of participation:  Pt was present and engaged in group discussion. Pt added insight to group topic. Pt interacted with peers and SW. Pt was calm, cooperative.        Katherine Gillies, MSW, [facility]

## 2024-02-14 NOTE — PLAN OF CARE
Problem: Safety - Adult  Goal: Free from fall injury  2/14/2024 0034 by Deepti Samuel LPN  Outcome: Progressing   Pt recieved in bed with eyes closed, appears to be sleeping. Respirations even and unlabored, no respiratory distress noted. NAD. Pathways free of clutter. Q15 min safety checks in place.

## 2024-02-14 NOTE — BH NOTE
PRN Medication Documentation    Specific patient behavior that led to need for PRN medication: pt c/o anxiety  Staff interventions attempted prior to PRN being given: therapeutic communication  PRN medication given: atarax 50 mg  Patient response/effectiveness of PRN medication: will continue to monitor pt for effectiveness

## 2024-02-14 NOTE — BH NOTE
GROUP THERAPY PROGRESS NOTE    Patient is participating in recreational therapy group.    Group time: 30 minutes    Personal goal for participation: To engage in “keep it going” ball game. To increase physical activity, promote socialization, improve hand/eye coordination.     Goal orientation: Personal    Group therapy participation: active    Therapeutic interventions reviewed and discussed:  Group members were given the opportunity to engage in the “keep it going” ball game. Members were able to participate in an activity that promotes healthy outlet while interacting and engaging in discussion.    Impression of participation: Pt was present and engaged in group activity.       Katherine Gillies, MSW, [facility]

## 2024-02-14 NOTE — PLAN OF CARE
Problem: Depression  Goal: Will be euthymic at discharge  Description: INTERVENTIONS:  1. Administer medication as ordered  2. Provide emotional support via 1:1 interaction with staff  3. Encourage involvement in milieu/groups/activities  4. Monitor for social isolation  2/14/2024 1605 by Laura Vail, RN  Outcome: Progressing     Problem: Anxiety  Goal: Will report anxiety at manageable levels  Description: INTERVENTIONS:  1. Administer medication as ordered  2. Teach and rehearse alternative coping skills  3. Provide emotional support with 1:1 interaction with staff  2/14/2024 1605 by Laura Vail, RN  Outcome: Progressing     Problem: Safety - Adult  Goal: Free from fall injury  Outcome: Progressing   Pt out on unit engaged with peers. Denies current SI/HI/AVH. Remains medication/meal compliant. Will continue to monitor q15 minutes for safety.

## 2024-02-15 PROCEDURE — 6370000000 HC RX 637 (ALT 250 FOR IP): Performed by: PSYCHIATRY & NEUROLOGY

## 2024-02-15 PROCEDURE — 6370000000 HC RX 637 (ALT 250 FOR IP)

## 2024-02-15 PROCEDURE — 1240000000 HC EMOTIONAL WELLNESS R&B

## 2024-02-15 RX ORDER — HYDROXYZINE 50 MG/1
50 TABLET, FILM COATED ORAL DAILY PRN
Qty: 7 TABLET | Refills: 3 | Status: SHIPPED | OUTPATIENT
Start: 2024-02-15 | End: 2024-03-16

## 2024-02-15 RX ORDER — VALACYCLOVIR HYDROCHLORIDE 500 MG/1
500 TABLET, FILM COATED ORAL DAILY
Qty: 7 TABLET | Refills: 3 | Status: SHIPPED | OUTPATIENT
Start: 2024-02-15 | End: 2024-03-16

## 2024-02-15 RX ORDER — IBUPROFEN 400 MG/1
800 TABLET ORAL EVERY 6 HOURS PRN
Status: DISCONTINUED | OUTPATIENT
Start: 2024-02-15 | End: 2024-02-16 | Stop reason: HOSPADM

## 2024-02-15 RX ORDER — LURASIDONE HYDROCHLORIDE 40 MG/1
40 TABLET, FILM COATED ORAL
Qty: 7 TABLET | Refills: 3 | Status: SHIPPED | OUTPATIENT
Start: 2024-02-15 | End: 2024-03-16

## 2024-02-15 RX ORDER — ACETAMINOPHEN 500 MG
1000 TABLET ORAL EVERY 6 HOURS PRN
Status: DISCONTINUED | OUTPATIENT
Start: 2024-02-15 | End: 2024-02-16 | Stop reason: HOSPADM

## 2024-02-15 RX ADMIN — LURASIDONE HYDROCHLORIDE 40 MG: 20 TABLET ORAL at 16:56

## 2024-02-15 RX ADMIN — ACETAMINOPHEN 650 MG: 325 TABLET ORAL at 08:46

## 2024-02-15 RX ADMIN — VALACYCLOVIR HYDROCHLORIDE 500 MG: 500 TABLET, FILM COATED ORAL at 08:32

## 2024-02-15 RX ADMIN — THERA TABS 1 TABLET: TAB at 08:32

## 2024-02-15 RX ADMIN — HYDROXYZINE HYDROCHLORIDE 50 MG: 50 TABLET, FILM COATED ORAL at 13:01

## 2024-02-15 RX ADMIN — Medication 100 MG: at 08:32

## 2024-02-15 RX ADMIN — IBUPROFEN 800 MG: 400 TABLET, FILM COATED ORAL at 20:44

## 2024-02-15 RX ADMIN — HALOPERIDOL 5 MG: 5 TABLET ORAL at 18:08

## 2024-02-15 RX ADMIN — Medication 1 MG: at 08:32

## 2024-02-15 RX ADMIN — IBUPROFEN 800 MG: 400 TABLET, FILM COATED ORAL at 10:46

## 2024-02-15 RX ADMIN — TRAZODONE HYDROCHLORIDE 50 MG: 50 TABLET ORAL at 20:45

## 2024-02-15 ASSESSMENT — PAIN DESCRIPTION - DESCRIPTORS
DESCRIPTORS: ACHING
DESCRIPTORS: THROBBING
DESCRIPTORS: ACHING

## 2024-02-15 ASSESSMENT — PAIN - FUNCTIONAL ASSESSMENT
PAIN_FUNCTIONAL_ASSESSMENT: 0-10
PAIN_FUNCTIONAL_ASSESSMENT: ACTIVITIES ARE NOT PREVENTED
PAIN_FUNCTIONAL_ASSESSMENT: NONE - DENIES PAIN

## 2024-02-15 ASSESSMENT — PAIN DESCRIPTION - ORIENTATION
ORIENTATION: LOWER
ORIENTATION: MID
ORIENTATION: LOWER

## 2024-02-15 ASSESSMENT — PAIN SCALES - GENERAL
PAINLEVEL_OUTOF10: 0
PAINLEVEL_OUTOF10: 5
PAINLEVEL_OUTOF10: 7
PAINLEVEL_OUTOF10: 5
PAINLEVEL_OUTOF10: 0
PAINLEVEL_OUTOF10: 3

## 2024-02-15 ASSESSMENT — PAIN DESCRIPTION - LOCATION
LOCATION: BACK

## 2024-02-15 NOTE — PLAN OF CARE
Problem: Discharge Planning  Goal: Discharge to home or other facility with appropriate resources  Outcome: Progressing  Flowsheets (Taken 2/14/2024 2359)  Discharge to home or other facility with appropriate resources:   Identify barriers to discharge with patient and caregiver   Identify discharge learning needs (meds, wound care, etc)     Problem: Anxiety  Goal: Will report anxiety at manageable levels  Description: INTERVENTIONS:  1. Administer medication as ordered  2. Teach and rehearse alternative coping skills  3. Provide emotional support with 1:1 interaction with staff  2/14/2024 2359 by Caleb Calvo, RN  Outcome: Progressing  Flowsheets (Taken 2/14/2024 2359)  Will report anxiety at manageable levels:   Administer medication as ordered   Teach and rehearse alternative coping skills

## 2024-02-15 NOTE — BH NOTE
PRN Medication Documentation    Specific patient behavior that led to need for PRN medication: pt requested for c/o increased anxiety   Staff interventions attempted prior to PRN being given: coping skills  PRN medication given: Atarax 50mg PO given @ 1301  Patient response/effectiveness of PRN medication: monitor and provide support as needed

## 2024-02-15 NOTE — INTERDISCIPLINARY ROUNDS
Behavioral Health Interdisciplinary Rounds     Patient Name: Deepti Ayala  Age: 48 y.o.  Room/Bed:  [facility]  Primary Diagnosis: Depression   Admission Status: Voluntary    Readmission within 30 days: No  Power of  in place: No  Patient requires a blocked bed: No          Reason for blocked bed:   Sleep hours:        Participation in Care/Groups:  Yes  Medication Compliant?: Yes  PRNS (last 24 hours): acetaminophen 650 mg, atarax 50 mg, trazodone 50 mg    Restraints (last 24 hours):  No  _____________________________________     24 hour chart check complete: Yes    _______________________________________________    Patient goal(s) for today: meet with treatment team   Treatment team focus/goals: Plan to set up her aftercare   Progress note: She denies SI , denies any issues with her medications.      Spiritual Care Consult:   Financial concerns/prescription coverage:  [facility]   Family contact:    parents                       Discharge plan: she will return home with her parents   Access to weapons : no                                                             Outpatient provider(s): [facility]       LOS:  5  Expected LOS: Tomorrow     Participating treatment team members: Deepti NIKA Jamie, DEBRA Muñoz- Dr. Griselda Matt RN

## 2024-02-15 NOTE — BH NOTE
GROUP THERAPY PROGRESS NOTE    Patient is participating in recreational therapy group.    Group time: 30 minutes    Personal goal for participation: To engage in “finish the phrase” and other puzzle activities.    Goal orientation: Personal    Group therapy participation: Passive    Therapeutic interventions reviewed and discussed:  Group members were given the opportunity to engage in the “finish the phrase” activity. Members were able to exercise socialization and memory skills. Members interacted with peers. Handout provided.     Impression of participation: Pt completed activity independently    Katherine Gillies, MSW, [facility]

## 2024-02-15 NOTE — PLAN OF CARE
Problem: Depression  Goal: Will be euthymic at discharge  Description: INTERVENTIONS:  1. Administer medication as ordered  2. Provide emotional support via 1:1 interaction with staff  3. Encourage involvement in milieu/groups/activities  4. Monitor for social isolation  2/15/2024 1605 by Maggie Ansari, RN  Outcome: Progressing  2/15/2024 1018 by Yvette Matt, RN  Outcome: Progressing  Note: Out on unit engaged and participating in groups/activities. Social w peers. Reports sleep adequate, reports back stiffness with pain. All these reports were discussed in tx team. Denies SI, no plan, no intent and no self harming behaviors. Describes decrease in hopelessness and feeling more confident in her ability to stay safe. States she is content with being alive and desire to be alive with family. Staff focus is on offering support     Problem: Anxiety  Goal: Will report anxiety at manageable levels  Description: INTERVENTIONS:  1. Administer medication as ordered  2. Teach and rehearse alternative coping skills  3. Provide emotional support with 1:1 interaction with staff  Outcome: Progressing  Flowsheets (Taken 2/15/2024 0750 by Yvette Matt, RN)  Will report anxiety at manageable levels:   Administer medication as ordered   Teach and rehearse alternative coping skills

## 2024-02-15 NOTE — PLAN OF CARE
Problem: Depression  Goal: Will be euthymic at discharge  Description: INTERVENTIONS:  1. Administer medication as ordered  2. Provide emotional support via 1:1 interaction with staff  3. Encourage involvement in milieu/groups/activities  4. Monitor for social isolation  Outcome: Progressing  Note: Out on unit engaged and participating in groups/activities. Social w peers. Reports sleep adequate, reports back stiffness with pain. All these reports were discussed in tx team. Denies SI, no plan, no intent and no self harming behaviors. Describes decrease in hopelessness and feeling more confident in her ability to stay safe. States she is content with being alive and desire to be alive with family. Staff focus is on offering support

## 2024-02-15 NOTE — BH NOTE
GROUP THERAPY PROGRESS NOTE    Patient is participating in Coping skills group.    Group time: 45 minutes    Personal goal for participation: To develop an understanding of Healthy verses unhealthy coping skills    Goal orientation: Personal    Group therapy participation:  active    Therapeutic interventions reviewed and discussed:  Group members were able to gain an understanding of healthy and unhealthy coping skills through video and discussion. Members were given the opportunity to engage in conversation about their experiences with coping and how it was helpful or hurtful.    Impression of participation: Pt was present and engaged in group discussion. Pt was calm, cooperative.     Katherine Gillies, MSW, [facility]

## 2024-02-15 NOTE — BH NOTE
PRN Medication Documentation    Specific patient behavior that led to need for PRN medication: c/o racing agitated thoughts,crying  Staff interventions attempted prior to PRN being given: coping skills  PRN medication given: haldol  Patient response/effectiveness of PRN medication: silas montez

## 2024-02-15 NOTE — BH NOTE
Chief Complaint:   \"I'm improving.\"    Length of Stay: 5 Days    Interval History:  12/15/2024  Patient reports feeling better with improving mood lability. She is still experiencing headache, but prn tylenol helps.  She denies having suicidal thoughts or low mood.    Had a good conversation with her mother and feels she could safely return home tomorrow. We discussed rx of 1 week at a time refill.    02/14/2024  Nursing report patient slept well. She says that she has a mild headache that tylenol helped with.  She feels that she is having a better day and feel supported, enjoyed the visit with her family last night.  Deepti feels that her anxiety is lifting. Today she reports feeling more hope and no suicidal thoughts this morning.  No AVH.    02/13/2024  Patient reports that she slept. However, she says that she is having a hard concentrating or focusing.  She says that she is ambivalent about whether she lives or dies.  Denies any plans or intents to harm or end her life.    02/12/2024  Nursing report patient is pleasant, adherent to medications and meals.  This morning she reports feeling quite hopeless. She is upset that her suicide attempt didn't work.  Deepti is tearful and sad.  Evaluation is positive for passive death wish.      02/11/2024  Nursing report patient has been pleasant, slept well and is engaged in the evaluation. She slept well overnight.  She saw her boyfriend and feels supported.  Says that at this time suicidal thoughts are fading in the background.    Past Medical History:  No past medical history on file.        Labs:  Lab Results   Component Value Date/Time    WBC 6.9 02/09/2024 02:51 AM    HGB 13.5 02/09/2024 02:51 AM    HCT 38.2 02/09/2024 02:51 AM     02/09/2024 02:51 AM    MCV 94.3 02/09/2024 02:51 AM      Lab Results   Component Value Date/Time     02/09/2024 02:51 AM    K 3.8 02/09/2024 02:51 AM     02/09/2024 02:51 AM    CO2 27 02/09/2024 02:51 AM    BUN 12  \"okay\"  Affect:euthymic and congruent  No passive death wish. No suicidal intent or plan.  No AVH  Insight/judgment are both poor    Vitals:    02/15/24 0750   BP: 99/64   Pulse: 58   Resp: 16   Temp: 98.4 °F (36.9 °C)   SpO2: 100%      Physical Exam:  Body habitus: Body mass index is 24.82 kg/m².  Musculoskeletal system: normal gait  Tremor - neg  Cog wheeling - neg    Assessment and Plan:  Deepti Ayala meets criteria for a diagnosis of   Bipolar Disorder 1, most recent episode depressed, severe, without psychotic features.     02/15/2024  Continue same dosages  Provided increase of tylenol from 650mg to 1000mg po q6h and added ibuprofen 800mg po q6h.  D/c tomorrow with 7 days of meds.    02/14/2024  Continue current medication regimen.  Pt interested in PHP upon d/c.    02/13/2024  Continue current dose of medication.    02/12/2024  Increase latuda from 20 to 40mg po qDinner.    02/11/2024  Will provide for once dulcolax supporistory and prn  Continue the medication regimen as prescribed  Disposition planning to continue.     A coordinated, multidisplinary treatment team round was conducted with the patient, nurses, pharmcist,  and writer present. Discussions held with , and/or with family members; Complete current electronic health record for patient was reviewed in full including consultant notes, ancillary staff notes, nurses and tech notes, labs and vitals.    I certify that this patients inpatient psychiatric hospital services furnished since the previous certification were, and continue to be, required for treatment that could reasonably be expected to improve the patient's condition, or for diagnostic study, and that the patient continues to need, on a daily basis, active treatment furnished directly by or requiring the supervision of inpatient psychiatric facility personnel. In addition, the hospital records show that services furnished were intensive treatment services,

## 2024-02-15 NOTE — BH NOTE
GROUP THERAPY PROGRESS NOTE    Patient is participating in psychoeducation group.    Group time: 60 minutes    Personal goal for participation: to gain an understanding of boundaries in our relationships    Goal orientation: Personal    Group therapy participation: Active     Therapeutic interventions reviewed and discussed:  Group members were guided through learning about the importance of boundaries. Members gained an understanding of what boundaries are, how to set them, and the differences between healthy and unhealthy boundaries through watching a video. Handouts provided.    Impression of participation: Pt was present and engaged in group discussion. Pt answered questions on handout and asked relevant questions. Pt was calm, cooperative.      Katherine Gillies, MSW, QMHP-A

## 2024-02-16 VITALS
HEIGHT: 63 IN | WEIGHT: 140.1 LBS | SYSTOLIC BLOOD PRESSURE: 103 MMHG | RESPIRATION RATE: 16 BRPM | TEMPERATURE: 98.1 F | DIASTOLIC BLOOD PRESSURE: 66 MMHG | OXYGEN SATURATION: 95 % | HEART RATE: 82 BPM | BODY MASS INDEX: 24.82 KG/M2

## 2024-02-16 PROCEDURE — 6370000000 HC RX 637 (ALT 250 FOR IP)

## 2024-02-16 PROCEDURE — 6370000000 HC RX 637 (ALT 250 FOR IP): Performed by: PSYCHIATRY & NEUROLOGY

## 2024-02-16 RX ADMIN — THERA TABS 1 TABLET: TAB at 08:31

## 2024-02-16 RX ADMIN — Medication 1 MG: at 08:31

## 2024-02-16 RX ADMIN — VALACYCLOVIR HYDROCHLORIDE 500 MG: 500 TABLET, FILM COATED ORAL at 08:31

## 2024-02-16 RX ADMIN — Medication 100 MG: at 08:31

## 2024-02-16 ASSESSMENT — PAIN SCALES - GENERAL: PAINLEVEL_OUTOF10: 0

## 2024-02-16 NOTE — TRANSITION OF CARE
Behavioral Health Transition Record to Provider    Patient Name: Deepti Ayala  YOB: 1975  Medical Record Number: [id number]  Date of Admission: 2/10/2024  Date of Discharge: 2/16/24    Attending Provider: No att. providers found  Discharging Provider: Dr. Villalobos  To contact this individual call [phone] and ask the  to page.  If unavailable, ask to be transferred to Behavioral Health Provider on call.  A Behavioral Health Provider will be available on call 24/7 and during holidays.    Primary Care Provider: Ally Sears MD    Allergies   Allergen Reactions    Latex Itching       Reason for Admission: Deepti Ayala is a 48 y.o. White (non-) female who is currently admitted to the general side of 7th floor behavioral health Unit at [facility].   She says that her living situation is not great and that she is stressed with overnight stays for . She's been under the care of Orion Webb and trying different medications. She complains that at times she is anxious she can't sleep, other times she sleeps for 4 days.     She reports that she's been feeling low and overwhelmed for the past 2 months and attempted to end her life impulsively by taking many of her medications.  She decided to overdose on medications, including wellbutrin, lamictal, and clonazepam. She said she 'laid there for a while,' as her boyfriend was making dinner for the kids. She then felt like nothing was effective. She felt that nothing right was happening to end her life. At the same time she doesn't recall exactly what was going on. Her boyfriend was quite concerned and drove her to the [facility].     Upon my evaluation, patient says that she regrets living. She feels nothing has gives her hope.  Patient denies experiencing hallucinations of any type.  There are fire-arms at home.       Admission Diagnosis: Depression [F32.A]    * No surgery found *    Results for

## 2024-02-16 NOTE — PROGRESS NOTES
Laboratory Monitoring for Antipsychotics:    This patient is currently prescribed the following medication(s):   Current Facility-Administered Medications: ibuprofen (ADVIL;MOTRIN) tablet 800 mg, 800 mg, Oral, Q6H PRN  acetaminophen (TYLENOL) tablet 1,000 mg, 1,000 mg, Oral, Q6H PRN  lurasidone (LATUDA) tablet 40 mg, 40 mg, Oral, Dinner  bisacodyl (DULCOLAX) suppository 10 mg, 10 mg, Rectal, Daily PRN  polyethylene glycol (GLYCOLAX) packet 17 g, 17 g, Oral, Daily PRN  senna (SENOKOT) tablet 8.6 mg, 1 tablet, Oral, Daily PRN  aluminum & magnesium hydroxide-simethicone (MAALOX) 200-200-20 MG/5ML suspension 30 mL, 30 mL, Oral, Q6H PRN  hydrOXYzine HCl (ATARAX) tablet 50 mg, 50 mg, Oral, TID PRN  haloperidol (HALDOL) tablet 5 mg, 5 mg, Oral, Q4H PRN **OR** haloperidol lactate (HALDOL) injection 5 mg, 5 mg, IntraMUSCular, Q4H PRN  diphenhydrAMINE (BENADRYL) injection 50 mg, 50 mg, IntraMUSCular, Q4H PRN  traZODone (DESYREL) tablet 50 mg, 50 mg, Oral, Nightly PRN  thiamine tablet 100 mg, 100 mg, Oral, Daily  folic acid (FOLVITE) tablet 1 mg, 1 mg, Oral, Daily  multivitamin 1 tablet, 1 tablet, Oral, Daily  valACYclovir (VALTREX) tablet 500 mg, 500 mg, Oral, Daily    The following labs have been completed for monitoring of antipsychotics and/or mood stabilizers:    Height, Weight, BMI Estimation  Estimated body mass index is 24.82 kg/m² as calculated from the following:    Height as of this encounter: 1.6 m (5' 3\").    Weight as of this encounter: 63.5 kg (140 lb 1.6 oz).     Vital Signs/Blood Pressure  /66   Pulse 82   Temp 98.1 °F (36.7 °C) (Oral)   Resp 16   Ht 1.6 m (5' 3\")   Wt 63.5 kg (140 lb 1.6 oz)   SpO2 95%   BMI 24.82 kg/m²     Renal Function, Hepatic Function and Chemistry  Estimated Creatinine Clearance: 79 mL/min (based on SCr of 0.8 mg/dL).    Lab Results   Component Value Date/Time     02/09/2024 02:51 AM    K 3.8 02/09/2024 02:51 AM     02/09/2024 02:51 AM    CO2 27 02/09/2024 02:51

## 2024-02-16 NOTE — DISCHARGE SUMMARY
DISCHARGE SUMMARY    Some parts of the discharge summary are from the initial Psychiatric interview that was done on admission by the admitting psychiatrist.     Date of Admission: 2/10/2024    Date of Discharge: 2/16/2024     TYPE OF DISCHARGE:   REGULAR -  YES    ADMISSION EVALUATION:  CHIEF COMPLAINT:  \"They said I should come here.\"     HISTORY OF PRESENTING COMPLAINT:  Deepti Ayala is a 48 y.o. White (non-) female who is currently admitted to the general side of 7th floor behavioral health Unit at [facility].   She says that her living situation is not great and that she is stressed with overnight stays for . She's been under the care of Orion Webb and trying different medications. She complains that at times she is anxious she can't sleep, other times she sleeps for 4 days.     She reports that she's been feeling low and overwhelmed for the past 2 months and attempted to end her life impulsively by taking many of her medications.  She decided to overdose on medications, including wellbutrin, lamictal, and clonazepam. She said she 'laid there for a while,' as her boyfriend was making dinner for the kids. She then felt like nothing was effective. She felt that nothing right was happening to end her life. At the same time she doesn't recall exactly what was going on. Her boyfriend was quite concerned and drove her to the [facility].     Upon my evaluation, patient says that she regrets living. She feels nothing has gives her hope.  Patient denies experiencing hallucinations of any type.  There are fire-arms at home.     PAST PSYCHIATRIC HISTORY   4 past inpatient psychiatric admissions, last being 5 years ago.  1 suicide attempts, last being 5 years ago.     NP Orion Webb, prescribes              Latuda from 60 to 20mg              Have been on prozac, worked., started wellbutrin 1 week              Lamictal 200mg po qday              Clonazepam 0.5mg                Vyvanse 60mg po qday.     Was on zoloft for some time.     Patient says that she experienced 1 year of significant disinhibition, bought paitings worth tens of thousands of dollars, flew to different cities, got piercings and tatoos.                SUBSTANCE USE HISTORY:  Tobacco: intermittent smoking.  Cannabis: none  Alcohol: social  IVD: none  No Cocaine/Heroin/amphetamines/LSD/PCP/Ketamine     LMP  Finished         Allergies:       Allergies   Allergen Reactions    Latex Itching         PAST MEDICAL HISTORY:     Please see H&P for details.      Past Medical History   No past medical history on file.     Home Medications           Prior to Admission medications    Medication Sig Start Date End Date Taking? Authorizing Provider   folic acid (FOLVITE) 1 MG tablet Take 1 tablet by mouth daily  Patient not taking: Reported on 2/10/2024 2/10/24     Koki Doshi DO   PHENobarbital 32.4 MG tablet Take 1 tablet by mouth 2 times daily for 1 dose. Max Daily Amount: 64.8 mg  Patient not taking: Reported on 2/10/2024 2/9/24 2/10/24   Koki Doshi DO   PHENobarbital 16.2 MG tablet Take 1 tablet by mouth 2 times daily for 2 doses. Max Daily Amount: 32.4 mg  Patient not taking: Reported on 2/10/2024 2/10/24 2/11/24   Koki Doshi DO   Multiple Vitamin (MULTIVITAMIN) TABS tablet Take 1 tablet by mouth daily  Patient not taking: Reported on 2/10/2024 2/10/24     Koki Doshi DO   thiamine mononitrate (THIAMINE) 100 MG tablet Take 1 tablet by mouth daily  Patient not taking: Reported on 2/10/2024 2/10/24     Koki Doshi DO   lurasidone (LATUDA) 40 MG TABS tablet Take 0.5 tablets by mouth Daily with supper  Patient not taking: Reported on 2/10/2024       Elbert Franco MD   buPROPion (WELLBUTRIN XL) 150 MG extended release tablet Take 1 tablet by mouth every morning       Elbert Franco MD   Lisdexamfetamine Dimesylate (VYVANSE) 60 MG CAPS Take 60 mg by mouth daily. Max Daily Amount: 60 mg       Joan

## 2024-02-16 NOTE — PLAN OF CARE
Problem: Safety - Adult  Goal: Free from fall injury  Outcome: Progressing   Recieved pt in room with eyes closed in bed resting, appears to be sleeping. No respiratory distress noted, respirations even and unlabored. NAD. Q15 min safety checks in place.

## 2024-02-16 NOTE — PLAN OF CARE
Problem: Discharge Planning  Goal: Discharge to home or other facility with appropriate resources  Flowsheets (Taken 2/16/2024 0892)  Discharge to home or other facility with appropriate resources:   Identify barriers to discharge with patient and caregiver   Arrange for needed discharge resources and transportation as appropriate   Identify discharge learning needs (meds, wound care, etc)   Refer to discharge planning if patient needs post-hospital services based on physician order or complex needs related to functional status, cognitive ability or social support system

## 2024-02-16 NOTE — INTERDISCIPLINARY ROUNDS
Behavioral Health Interdisciplinary Rounds     Patient Name: Deepti Ayala  Age: 48 y.o.  Room/Bed:  [facility]  Primary Diagnosis: Depression   Admission Status: Voluntary    Readmission within 30 days: No  Power of  in place: No  Patient requires a blocked bed: No          Reason for blocked bed:   Sleep hours:       Participation in Care/Groups:  Yes  Medication Compliant?: Yes  PRNS (last 24 hours): haldol, atarax, ibuprofen, trazodone   Restraints (last 24 hours):  No  __________________________________________________  OQ Admission Analysis Survey completed:  OQ Admission Analysis Survey score:  __________________________________________________     Alcohol screening (AUDIT) completed -     If applicable, date SBIRT discussed in treatment team AND documented:    Tobacco - patient is a smoker:    Illegal Drugs use:      24 hour chart check complete: Yes    _______________________________________________    Patient goal(s) for today:   Treatment team focus/goals:   Progress note:      Spiritual Care Consult:   Financial concerns/prescription coverage:    Family contact:                        Family requesting physician contact today:    Discharge plan:   Access to weapons :                                                              Outpatient provider(s):   Patient's preferred phone number for follow up call :   Patient's preferred e-mail address :    LOS:  6  Expected LOS:     Participating treatment team members: Deepti Ayala, * (assigned SW),

## 2024-02-16 NOTE — PROGRESS NOTES
Pharmacist Discharge Medication Reconciliation    Discharging Provider: Dr. Villalobos    Significant PMH: No past medical history on file.    Chief Complaint for this Admission: No chief complaint on file.    Allergies: Latex    Discharge Medications:      Medication List        START taking these medications      hydrOXYzine HCl 50 MG tablet  Commonly known as: ATARAX  Take 1 tablet by mouth daily as needed for Anxiety            CHANGE how you take these medications      lurasidone 40 MG Tabs tablet  Commonly known as: LATUDA  Take 1 tablet by mouth Daily with supper  What changed: how much to take            CONTINUE taking these medications      valACYclovir 500 MG tablet  Commonly known as: VALTREX  Take 1 tablet by mouth daily            STOP taking these medications      buPROPion 150 MG extended release tablet  Commonly known as: WELLBUTRIN XL     folic acid 1 MG tablet  Commonly known as: FOLVITE     lamoTRIgine 200 MG tablet  Commonly known as: LAMICTAL     multivitamin Tabs tablet     PHENobarbital 16.2 MG tablet     PHENobarbital 32.4 MG tablet     vitamin B-1 100 MG tablet  Commonly known as: THIAMINE     Vyvanse 60 MG Caps  Generic drug: Lisdexamfetamine Dimesylate               Where to Get Your Medications        These medications were sent to [id number] - [place], [place] - [address] - P [phone] - F [phone]  [address] [address]      Phone: [phone]   hydrOXYzine HCl 50 MG tablet  lurasidone 40 MG Tabs tablet  valACYclovir 500 MG tablet         The patient's chart, MAR and AVS were reviewed by Katy Reis RPH.

## 2024-09-12 ENCOUNTER — ANESTHESIA (OUTPATIENT)
Facility: HOSPITAL | Age: 49
End: 2024-09-12
Payer: COMMERCIAL

## 2024-09-12 ENCOUNTER — HOSPITAL ENCOUNTER (INPATIENT)
Facility: HOSPITAL | Age: 49
LOS: 6 days | Discharge: HOME | DRG: 330 | End: 2024-09-18
Attending: EMERGENCY MEDICINE | Admitting: SURGERY
Payer: COMMERCIAL

## 2024-09-12 ENCOUNTER — ANESTHESIA EVENT (OUTPATIENT)
Facility: HOSPITAL | Age: 49
End: 2024-09-12
Payer: COMMERCIAL

## 2024-09-12 ENCOUNTER — APPOINTMENT (OUTPATIENT)
Facility: HOSPITAL | Age: 49
DRG: 330 | End: 2024-09-12
Payer: COMMERCIAL

## 2024-09-12 DIAGNOSIS — K66.8: Primary | ICD-10-CM

## 2024-09-12 DIAGNOSIS — R52 ACUTE PAIN: ICD-10-CM

## 2024-09-12 DIAGNOSIS — R10.30 LOWER ABDOMINAL PAIN: ICD-10-CM

## 2024-09-12 LAB
ALBUMIN SERPL-MCNC: 3.8 G/DL (ref 3.5–5)
ALBUMIN/GLOB SERPL: 1.2 (ref 1.1–2.2)
ALP SERPL-CCNC: 71 U/L (ref 45–117)
ALT SERPL-CCNC: 16 U/L (ref 12–78)
ANION GAP SERPL CALC-SCNC: 9 MMOL/L (ref 2–12)
APPEARANCE UR: CLEAR
AST SERPL-CCNC: 13 U/L (ref 15–37)
BACTERIA URNS QL MICRO: NEGATIVE /HPF
BASOPHILS # BLD: 0 K/UL (ref 0–0.1)
BASOPHILS NFR BLD: 0 % (ref 0–1)
BILIRUB SERPL-MCNC: 0.5 MG/DL (ref 0.2–1)
BILIRUB UR QL: NEGATIVE
BUN SERPL-MCNC: 21 MG/DL (ref 6–20)
BUN/CREAT SERPL: 27 (ref 12–20)
CALCIUM SERPL-MCNC: 8.8 MG/DL (ref 8.5–10.1)
CHLORIDE SERPL-SCNC: 104 MMOL/L (ref 97–108)
CO2 SERPL-SCNC: 27 MMOL/L (ref 21–32)
COLOR UR: NORMAL
CREAT SERPL-MCNC: 0.77 MG/DL (ref 0.55–1.02)
DIFFERENTIAL METHOD BLD: ABNORMAL
EOSINOPHIL # BLD: 0 K/UL (ref 0–0.4)
EOSINOPHIL NFR BLD: 0 % (ref 0–7)
EPITH CASTS URNS QL MICRO: NORMAL /LPF
ERYTHROCYTE [DISTWIDTH] IN BLOOD BY AUTOMATED COUNT: 11.6 % (ref 11.5–14.5)
GLOBULIN SER CALC-MCNC: 3.2 G/DL (ref 2–4)
GLUCOSE SERPL-MCNC: 105 MG/DL (ref 65–100)
GLUCOSE UR STRIP.AUTO-MCNC: NEGATIVE MG/DL
HCG UR QL: NEGATIVE
HCT VFR BLD AUTO: 42.9 % (ref 35–47)
HGB BLD-MCNC: 14.5 G/DL (ref 11.5–16)
HGB UR QL STRIP: NEGATIVE
IMM GRANULOCYTES # BLD AUTO: 0.1 K/UL (ref 0–0.04)
IMM GRANULOCYTES NFR BLD AUTO: 0 % (ref 0–0.5)
KETONES UR QL STRIP.AUTO: NEGATIVE MG/DL
LEUKOCYTE ESTERASE UR QL STRIP.AUTO: NEGATIVE
LIPASE SERPL-CCNC: 24 U/L (ref 13–75)
LYMPHOCYTES # BLD: 0.9 K/UL (ref 0.8–3.5)
LYMPHOCYTES NFR BLD: 6 % (ref 12–49)
MAGNESIUM SERPL-MCNC: 1.4 MG/DL (ref 1.6–2.4)
MCH RBC QN AUTO: 32.4 PG (ref 26–34)
MCHC RBC AUTO-ENTMCNC: 33.8 G/DL (ref 30–36.5)
MCV RBC AUTO: 96 FL (ref 80–99)
MONOCYTES # BLD: 0.5 K/UL (ref 0–1)
MONOCYTES NFR BLD: 3 % (ref 5–13)
NEUTS SEG # BLD: 12.9 K/UL (ref 1.8–8)
NEUTS SEG NFR BLD: 91 % (ref 32–75)
NITRITE UR QL STRIP.AUTO: NEGATIVE
NRBC # BLD: 0 K/UL (ref 0–0.01)
NRBC BLD-RTO: 0 PER 100 WBC
PH UR STRIP: 6 (ref 5–8)
PLATELET # BLD AUTO: 202 K/UL (ref 150–400)
PMV BLD AUTO: 10.7 FL (ref 8.9–12.9)
POTASSIUM SERPL-SCNC: 4 MMOL/L (ref 3.5–5.1)
PROT SERPL-MCNC: 7 G/DL (ref 6.4–8.2)
PROT UR STRIP-MCNC: NEGATIVE MG/DL
RBC # BLD AUTO: 4.47 M/UL (ref 3.8–5.2)
RBC #/AREA URNS HPF: NORMAL /HPF (ref 0–5)
SODIUM SERPL-SCNC: 140 MMOL/L (ref 136–145)
SP GR UR REFRACTOMETRY: 1.02 (ref 1–1.03)
URINE CULTURE IF INDICATED: NORMAL
UROBILINOGEN UR QL STRIP.AUTO: 0.2 EU/DL (ref 0.2–1)
WBC # BLD AUTO: 14.3 K/UL (ref 3.6–11)
WBC URNS QL MICRO: NORMAL /HPF (ref 0–4)

## 2024-09-12 PROCEDURE — 96374 THER/PROPH/DIAG INJ IV PUSH: CPT

## 2024-09-12 PROCEDURE — 6360000004 HC RX CONTRAST MEDICATION: Performed by: EMERGENCY MEDICINE

## 2024-09-12 PROCEDURE — 3700000001 HC ADD 15 MINUTES (ANESTHESIA): Performed by: SURGERY

## 2024-09-12 PROCEDURE — 99285 EMERGENCY DEPT VISIT HI MDM: CPT

## 2024-09-12 PROCEDURE — 6360000002 HC RX W HCPCS: Performed by: ANESTHESIOLOGY

## 2024-09-12 PROCEDURE — 81001 URINALYSIS AUTO W/SCOPE: CPT

## 2024-09-12 PROCEDURE — 6370000000 HC RX 637 (ALT 250 FOR IP): Performed by: EMERGENCY MEDICINE

## 2024-09-12 PROCEDURE — 74177 CT ABD & PELVIS W/CONTRAST: CPT

## 2024-09-12 PROCEDURE — 83735 ASSAY OF MAGNESIUM: CPT

## 2024-09-12 PROCEDURE — 2580000003 HC RX 258

## 2024-09-12 PROCEDURE — 7100000001 HC PACU RECOVERY - ADDTL 15 MIN: Performed by: SURGERY

## 2024-09-12 PROCEDURE — 81025 URINE PREGNANCY TEST: CPT

## 2024-09-12 PROCEDURE — 6360000002 HC RX W HCPCS: Performed by: SURGERY

## 2024-09-12 PROCEDURE — 1100000000 HC RM PRIVATE

## 2024-09-12 PROCEDURE — 80053 COMPREHEN METABOLIC PANEL: CPT

## 2024-09-12 PROCEDURE — 94761 N-INVAS EAR/PLS OXIMETRY MLT: CPT

## 2024-09-12 PROCEDURE — 96375 TX/PRO/DX INJ NEW DRUG ADDON: CPT

## 2024-09-12 PROCEDURE — 6360000002 HC RX W HCPCS: Performed by: EMERGENCY MEDICINE

## 2024-09-12 PROCEDURE — 83690 ASSAY OF LIPASE: CPT

## 2024-09-12 PROCEDURE — 96376 TX/PRO/DX INJ SAME DRUG ADON: CPT

## 2024-09-12 PROCEDURE — 88307 TISSUE EXAM BY PATHOLOGIST: CPT

## 2024-09-12 PROCEDURE — 2580000003 HC RX 258: Performed by: ANESTHESIOLOGY

## 2024-09-12 PROCEDURE — 2700000000 HC OXYGEN THERAPY PER DAY

## 2024-09-12 PROCEDURE — 2709999900 HC NON-CHARGEABLE SUPPLY: Performed by: SURGERY

## 2024-09-12 PROCEDURE — 3600000014 HC SURGERY LEVEL 4 ADDTL 15MIN: Performed by: SURGERY

## 2024-09-12 PROCEDURE — 2500000003 HC RX 250 WO HCPCS: Performed by: SURGERY

## 2024-09-12 PROCEDURE — 3600000004 HC SURGERY LEVEL 4 BASE: Performed by: SURGERY

## 2024-09-12 PROCEDURE — 6370000000 HC RX 637 (ALT 250 FOR IP): Performed by: SURGERY

## 2024-09-12 PROCEDURE — 2500000003 HC RX 250 WO HCPCS: Performed by: NURSE ANESTHETIST, CERTIFIED REGISTERED

## 2024-09-12 PROCEDURE — 7100000000 HC PACU RECOVERY - FIRST 15 MIN: Performed by: SURGERY

## 2024-09-12 PROCEDURE — 36415 COLL VENOUS BLD VENIPUNCTURE: CPT

## 2024-09-12 PROCEDURE — 2580000003 HC RX 258: Performed by: SURGERY

## 2024-09-12 PROCEDURE — 6360000002 HC RX W HCPCS: Performed by: NURSE ANESTHETIST, CERTIFIED REGISTERED

## 2024-09-12 PROCEDURE — 85025 COMPLETE CBC W/AUTO DIFF WBC: CPT

## 2024-09-12 PROCEDURE — 3700000000 HC ANESTHESIA ATTENDED CARE: Performed by: SURGERY

## 2024-09-12 PROCEDURE — 2720000010 HC SURG SUPPLY STERILE: Performed by: SURGERY

## 2024-09-12 RX ORDER — FENTANYL CITRATE 50 UG/ML
100 INJECTION, SOLUTION INTRAMUSCULAR; INTRAVENOUS
Status: DISCONTINUED | OUTPATIENT
Start: 2024-09-12 | End: 2024-09-12 | Stop reason: HOSPADM

## 2024-09-12 RX ORDER — NALOXONE HYDROCHLORIDE 0.4 MG/ML
INJECTION, SOLUTION INTRAMUSCULAR; INTRAVENOUS; SUBCUTANEOUS PRN
Status: DISCONTINUED | OUTPATIENT
Start: 2024-09-12 | End: 2024-09-12 | Stop reason: HOSPADM

## 2024-09-12 RX ORDER — METRONIDAZOLE 500 MG/100ML
500 INJECTION, SOLUTION INTRAVENOUS EVERY 8 HOURS
Status: CANCELLED | OUTPATIENT
Start: 2024-09-12 | End: 2024-09-12

## 2024-09-12 RX ORDER — METRONIDAZOLE 500 MG/100ML
INJECTION, SOLUTION INTRAVENOUS
Status: DISCONTINUED | OUTPATIENT
Start: 2024-09-12 | End: 2024-09-12 | Stop reason: SDUPTHER

## 2024-09-12 RX ORDER — ONDANSETRON 2 MG/ML
4 INJECTION INTRAMUSCULAR; INTRAVENOUS EVERY 6 HOURS PRN
Status: DISCONTINUED | OUTPATIENT
Start: 2024-09-12 | End: 2024-09-18 | Stop reason: HOSPADM

## 2024-09-12 RX ORDER — ROCURONIUM BROMIDE 10 MG/ML
INJECTION, SOLUTION INTRAVENOUS
Status: DISCONTINUED | OUTPATIENT
Start: 2024-09-12 | End: 2024-09-12 | Stop reason: SDUPTHER

## 2024-09-12 RX ORDER — IOPAMIDOL 755 MG/ML
100 INJECTION, SOLUTION INTRAVASCULAR
Status: COMPLETED | OUTPATIENT
Start: 2024-09-12 | End: 2024-09-12

## 2024-09-12 RX ORDER — ACETAMINOPHEN 500 MG
1000 TABLET ORAL EVERY 6 HOURS SCHEDULED
Status: DISCONTINUED | OUTPATIENT
Start: 2024-09-12 | End: 2024-09-18 | Stop reason: HOSPADM

## 2024-09-12 RX ORDER — SODIUM CHLORIDE 0.9 % (FLUSH) 0.9 %
5-40 SYRINGE (ML) INJECTION PRN
Status: DISCONTINUED | OUTPATIENT
Start: 2024-09-12 | End: 2024-09-18 | Stop reason: HOSPADM

## 2024-09-12 RX ORDER — BUPIVACAINE HYDROCHLORIDE AND EPINEPHRINE 5; 5 MG/ML; UG/ML
INJECTION, SOLUTION EPIDURAL; INTRACAUDAL; PERINEURAL PRN
Status: DISCONTINUED | OUTPATIENT
Start: 2024-09-12 | End: 2024-09-12 | Stop reason: ALTCHOICE

## 2024-09-12 RX ORDER — SODIUM CHLORIDE 9 MG/ML
INJECTION, SOLUTION INTRAVENOUS PRN
Status: DISCONTINUED | OUTPATIENT
Start: 2024-09-12 | End: 2024-09-18 | Stop reason: HOSPADM

## 2024-09-12 RX ORDER — MORPHINE SULFATE 4 MG/ML
4 INJECTION, SOLUTION INTRAMUSCULAR; INTRAVENOUS
Status: COMPLETED | OUTPATIENT
Start: 2024-09-12 | End: 2024-09-12

## 2024-09-12 RX ORDER — LIDOCAINE HYDROCHLORIDE 10 MG/ML
1 INJECTION, SOLUTION EPIDURAL; INFILTRATION; INTRACAUDAL; PERINEURAL
Status: DISCONTINUED | OUTPATIENT
Start: 2024-09-12 | End: 2024-09-12 | Stop reason: HOSPADM

## 2024-09-12 RX ORDER — ONDANSETRON 2 MG/ML
4 INJECTION INTRAMUSCULAR; INTRAVENOUS
Status: DISCONTINUED | OUTPATIENT
Start: 2024-09-12 | End: 2024-09-12 | Stop reason: HOSPADM

## 2024-09-12 RX ORDER — SODIUM CHLORIDE, SODIUM LACTATE, POTASSIUM CHLORIDE, CALCIUM CHLORIDE 600; 310; 30; 20 MG/100ML; MG/100ML; MG/100ML; MG/100ML
INJECTION, SOLUTION INTRAVENOUS CONTINUOUS
Status: DISCONTINUED | OUTPATIENT
Start: 2024-09-12 | End: 2024-09-12 | Stop reason: HOSPADM

## 2024-09-12 RX ORDER — PROPOFOL 10 MG/ML
INJECTION, EMULSION INTRAVENOUS
Status: DISCONTINUED | OUTPATIENT
Start: 2024-09-12 | End: 2024-09-12 | Stop reason: SDUPTHER

## 2024-09-12 RX ORDER — ACETAMINOPHEN 500 MG
1000 TABLET ORAL
Status: COMPLETED | OUTPATIENT
Start: 2024-09-12 | End: 2024-09-12

## 2024-09-12 RX ORDER — ONDANSETRON 2 MG/ML
4 INJECTION INTRAMUSCULAR; INTRAVENOUS ONCE
Status: COMPLETED | OUTPATIENT
Start: 2024-09-12 | End: 2024-09-12

## 2024-09-12 RX ORDER — OXYCODONE HYDROCHLORIDE 5 MG/1
5 TABLET ORAL EVERY 4 HOURS PRN
Status: DISCONTINUED | OUTPATIENT
Start: 2024-09-12 | End: 2024-09-18 | Stop reason: HOSPADM

## 2024-09-12 RX ORDER — ONDANSETRON 2 MG/ML
INJECTION INTRAMUSCULAR; INTRAVENOUS
Status: DISCONTINUED | OUTPATIENT
Start: 2024-09-12 | End: 2024-09-12 | Stop reason: SDUPTHER

## 2024-09-12 RX ORDER — MIDAZOLAM HYDROCHLORIDE 2 MG/2ML
2 INJECTION, SOLUTION INTRAMUSCULAR; INTRAVENOUS
Status: DISCONTINUED | OUTPATIENT
Start: 2024-09-12 | End: 2024-09-12 | Stop reason: HOSPADM

## 2024-09-12 RX ORDER — DEXAMETHASONE SODIUM PHOSPHATE 4 MG/ML
INJECTION, SOLUTION INTRA-ARTICULAR; INTRALESIONAL; INTRAMUSCULAR; INTRAVENOUS; SOFT TISSUE
Status: DISCONTINUED | OUTPATIENT
Start: 2024-09-12 | End: 2024-09-12 | Stop reason: SDUPTHER

## 2024-09-12 RX ORDER — PHENYLEPHRINE HCL IN 0.9% NACL 0.4MG/10ML
SYRINGE (ML) INTRAVENOUS
Status: DISCONTINUED | OUTPATIENT
Start: 2024-09-12 | End: 2024-09-12 | Stop reason: SDUPTHER

## 2024-09-12 RX ORDER — SUCCINYLCHOLINE/SOD CL,ISO/PF 100 MG/5ML
SYRINGE (ML) INTRAVENOUS
Status: DISCONTINUED | OUTPATIENT
Start: 2024-09-12 | End: 2024-09-12 | Stop reason: SDUPTHER

## 2024-09-12 RX ORDER — FENTANYL CITRATE 50 UG/ML
INJECTION, SOLUTION INTRAMUSCULAR; INTRAVENOUS
Status: DISCONTINUED | OUTPATIENT
Start: 2024-09-12 | End: 2024-09-12 | Stop reason: SDUPTHER

## 2024-09-12 RX ORDER — POTASSIUM CHLORIDE 750 MG/1
40 TABLET, EXTENDED RELEASE ORAL PRN
Status: DISCONTINUED | OUTPATIENT
Start: 2024-09-12 | End: 2024-09-18 | Stop reason: HOSPADM

## 2024-09-12 RX ORDER — DIPHENHYDRAMINE HYDROCHLORIDE 50 MG/ML
12.5 INJECTION INTRAMUSCULAR; INTRAVENOUS
Status: DISCONTINUED | OUTPATIENT
Start: 2024-09-12 | End: 2024-09-12 | Stop reason: HOSPADM

## 2024-09-12 RX ORDER — KETOROLAC TROMETHAMINE 30 MG/ML
15 INJECTION, SOLUTION INTRAMUSCULAR; INTRAVENOUS ONCE
Status: COMPLETED | OUTPATIENT
Start: 2024-09-12 | End: 2024-09-12

## 2024-09-12 RX ORDER — OXYCODONE HYDROCHLORIDE 10 MG/1
10 TABLET ORAL EVERY 4 HOURS PRN
Status: DISCONTINUED | OUTPATIENT
Start: 2024-09-12 | End: 2024-09-18 | Stop reason: HOSPADM

## 2024-09-12 RX ORDER — CYCLOBENZAPRINE HCL 10 MG
10 TABLET ORAL
Status: COMPLETED | OUTPATIENT
Start: 2024-09-12 | End: 2024-09-12

## 2024-09-12 RX ORDER — LUMATEPERONE 21 MG/1
CAPSULE ORAL
COMMUNITY

## 2024-09-12 RX ORDER — SODIUM CHLORIDE, SODIUM LACTATE, POTASSIUM CHLORIDE, CALCIUM CHLORIDE 600; 310; 30; 20 MG/100ML; MG/100ML; MG/100ML; MG/100ML
INJECTION, SOLUTION INTRAVENOUS CONTINUOUS
Status: DISCONTINUED | OUTPATIENT
Start: 2024-09-12 | End: 2024-09-14

## 2024-09-12 RX ORDER — POTASSIUM CHLORIDE 7.45 MG/ML
10 INJECTION INTRAVENOUS PRN
Status: DISCONTINUED | OUTPATIENT
Start: 2024-09-12 | End: 2024-09-18 | Stop reason: HOSPADM

## 2024-09-12 RX ORDER — MAGNESIUM SULFATE IN WATER 40 MG/ML
INJECTION, SOLUTION INTRAVENOUS
Status: DISCONTINUED | OUTPATIENT
Start: 2024-09-12 | End: 2024-09-12 | Stop reason: SDUPTHER

## 2024-09-12 RX ORDER — SODIUM CHLORIDE 0.9 % (FLUSH) 0.9 %
5-40 SYRINGE (ML) INJECTION EVERY 12 HOURS SCHEDULED
Status: DISCONTINUED | OUTPATIENT
Start: 2024-09-12 | End: 2024-09-18 | Stop reason: HOSPADM

## 2024-09-12 RX ORDER — CEFAZOLIN SODIUM 1 G/3ML
INJECTION, POWDER, FOR SOLUTION INTRAMUSCULAR; INTRAVENOUS
Status: DISCONTINUED | OUTPATIENT
Start: 2024-09-12 | End: 2024-09-12 | Stop reason: SDUPTHER

## 2024-09-12 RX ORDER — MIDAZOLAM HYDROCHLORIDE 1 MG/ML
INJECTION INTRAMUSCULAR; INTRAVENOUS
Status: DISCONTINUED | OUTPATIENT
Start: 2024-09-12 | End: 2024-09-12 | Stop reason: SDUPTHER

## 2024-09-12 RX ORDER — MAGNESIUM SULFATE IN WATER 40 MG/ML
2000 INJECTION, SOLUTION INTRAVENOUS PRN
Status: DISCONTINUED | OUTPATIENT
Start: 2024-09-12 | End: 2024-09-18 | Stop reason: HOSPADM

## 2024-09-12 RX ADMIN — FENTANYL CITRATE 50 MCG: 50 INJECTION, SOLUTION INTRAMUSCULAR; INTRAVENOUS at 17:00

## 2024-09-12 RX ADMIN — SUGAMMADEX 150 MG: 100 INJECTION, SOLUTION INTRAVENOUS at 16:58

## 2024-09-12 RX ADMIN — ONDANSETRON 4 MG: 2 INJECTION INTRAMUSCULAR; INTRAVENOUS at 16:42

## 2024-09-12 RX ADMIN — FENTANYL CITRATE 25 MCG: 50 INJECTION, SOLUTION INTRAMUSCULAR; INTRAVENOUS at 16:46

## 2024-09-12 RX ADMIN — MORPHINE SULFATE 4 MG: 4 INJECTION INTRAVENOUS at 13:52

## 2024-09-12 RX ADMIN — FENTANYL CITRATE 50 MCG: 50 INJECTION, SOLUTION INTRAMUSCULAR; INTRAVENOUS at 15:44

## 2024-09-12 RX ADMIN — ROCURONIUM BROMIDE 15 MG: 10 INJECTION, SOLUTION INTRAVENOUS at 16:03

## 2024-09-12 RX ADMIN — ROCURONIUM BROMIDE 15 MG: 10 INJECTION, SOLUTION INTRAVENOUS at 15:54

## 2024-09-12 RX ADMIN — DEXAMETHASONE SODIUM PHOSPHATE 4 MG: 4 INJECTION INTRA-ARTICULAR; INTRALESIONAL; INTRAMUSCULAR; INTRAVENOUS; SOFT TISSUE at 15:59

## 2024-09-12 RX ADMIN — PIPERACILLIN AND TAZOBACTAM 4500 MG: 4; .5 INJECTION, POWDER, FOR SOLUTION INTRAVENOUS at 17:31

## 2024-09-12 RX ADMIN — CEFAZOLIN 2 G: 1 INJECTION, POWDER, FOR SOLUTION INTRAMUSCULAR; INTRAVENOUS at 15:56

## 2024-09-12 RX ADMIN — MIDAZOLAM HYDROCHLORIDE 2 MG: 1 INJECTION, SOLUTION INTRAMUSCULAR; INTRAVENOUS at 15:38

## 2024-09-12 RX ADMIN — HYDROMORPHONE HYDROCHLORIDE 0.5 MG: 1 INJECTION, SOLUTION INTRAMUSCULAR; INTRAVENOUS; SUBCUTANEOUS at 17:30

## 2024-09-12 RX ADMIN — IOPAMIDOL 100 ML: 755 INJECTION, SOLUTION INTRAVENOUS at 11:27

## 2024-09-12 RX ADMIN — ROCURONIUM BROMIDE 5 MG: 10 INJECTION, SOLUTION INTRAVENOUS at 15:45

## 2024-09-12 RX ADMIN — SODIUM CHLORIDE, POTASSIUM CHLORIDE, SODIUM LACTATE AND CALCIUM CHLORIDE: 600; 310; 30; 20 INJECTION, SOLUTION INTRAVENOUS at 15:30

## 2024-09-12 RX ADMIN — ONDANSETRON 4 MG: 2 INJECTION INTRAMUSCULAR; INTRAVENOUS at 13:52

## 2024-09-12 RX ADMIN — ONDANSETRON 4 MG: 2 INJECTION INTRAMUSCULAR; INTRAVENOUS at 10:49

## 2024-09-12 RX ADMIN — FENTANYL CITRATE 50 MCG: 50 INJECTION, SOLUTION INTRAMUSCULAR; INTRAVENOUS at 15:40

## 2024-09-12 RX ADMIN — HYDROMORPHONE HYDROCHLORIDE 0.5 MG: 1 INJECTION, SOLUTION INTRAMUSCULAR; INTRAVENOUS; SUBCUTANEOUS at 18:08

## 2024-09-12 RX ADMIN — KETOROLAC TROMETHAMINE 15 MG: 30 INJECTION, SOLUTION INTRAMUSCULAR at 10:48

## 2024-09-12 RX ADMIN — HYDROMORPHONE HYDROCHLORIDE 0.5 MG: 1 INJECTION, SOLUTION INTRAMUSCULAR; INTRAVENOUS; SUBCUTANEOUS at 21:07

## 2024-09-12 RX ADMIN — Medication 80 MCG: at 15:52

## 2024-09-12 RX ADMIN — OXYCODONE HYDROCHLORIDE 10 MG: 10 TABLET ORAL at 18:47

## 2024-09-12 RX ADMIN — FENTANYL CITRATE 50 MCG: 50 INJECTION, SOLUTION INTRAMUSCULAR; INTRAVENOUS at 17:07

## 2024-09-12 RX ADMIN — ACETAMINOPHEN 1000 MG: 500 TABLET ORAL at 12:23

## 2024-09-12 RX ADMIN — Medication 80 MCG: at 15:56

## 2024-09-12 RX ADMIN — MAGNESIUM SULFATE HEPTAHYDRATE 2000 MG: 40 INJECTION, SOLUTION INTRAVENOUS at 15:58

## 2024-09-12 RX ADMIN — Medication 100 MG: at 15:46

## 2024-09-12 RX ADMIN — ACETAMINOPHEN 1000 MG: 500 TABLET ORAL at 18:47

## 2024-09-12 RX ADMIN — SODIUM CHLORIDE, POTASSIUM CHLORIDE, SODIUM LACTATE AND CALCIUM CHLORIDE: 600; 310; 30; 20 INJECTION, SOLUTION INTRAVENOUS at 15:57

## 2024-09-12 RX ADMIN — FENTANYL CITRATE 25 MCG: 50 INJECTION, SOLUTION INTRAMUSCULAR; INTRAVENOUS at 16:35

## 2024-09-12 RX ADMIN — SODIUM CHLORIDE, POTASSIUM CHLORIDE, SODIUM LACTATE AND CALCIUM CHLORIDE: 600; 310; 30; 20 INJECTION, SOLUTION INTRAVENOUS at 15:08

## 2024-09-12 RX ADMIN — PROPOFOL 190 MG: 10 INJECTION, EMULSION INTRAVENOUS at 15:46

## 2024-09-12 RX ADMIN — HYDROMORPHONE HYDROCHLORIDE 0.5 MG: 1 INJECTION, SOLUTION INTRAMUSCULAR; INTRAVENOUS; SUBCUTANEOUS at 17:38

## 2024-09-12 RX ADMIN — CYCLOBENZAPRINE 10 MG: 10 TABLET, FILM COATED ORAL at 12:24

## 2024-09-12 RX ADMIN — METRONIDAZOLE 500 MG: 500 INJECTION, SOLUTION INTRAVENOUS at 15:54

## 2024-09-12 ASSESSMENT — PAIN DESCRIPTION - DESCRIPTORS
DESCRIPTORS: SHARP
DESCRIPTORS: ACHING;SORE;ITCHING
DESCRIPTORS: ACHING;SORE
DESCRIPTORS: SHARP

## 2024-09-12 ASSESSMENT — PAIN - FUNCTIONAL ASSESSMENT
PAIN_FUNCTIONAL_ASSESSMENT: ACTIVITIES ARE NOT PREVENTED
PAIN_FUNCTIONAL_ASSESSMENT: PREVENTS OR INTERFERES WITH MANY ACTIVE NOT PASSIVE ACTIVITIES
PAIN_FUNCTIONAL_ASSESSMENT: 0-10
PAIN_FUNCTIONAL_ASSESSMENT: ACTIVITIES ARE NOT PREVENTED
PAIN_FUNCTIONAL_ASSESSMENT: PREVENTS OR INTERFERES WITH MANY ACTIVE NOT PASSIVE ACTIVITIES
PAIN_FUNCTIONAL_ASSESSMENT: ACTIVITIES ARE NOT PREVENTED
PAIN_FUNCTIONAL_ASSESSMENT: ACTIVITIES ARE NOT PREVENTED

## 2024-09-12 ASSESSMENT — PAIN DESCRIPTION - ORIENTATION
ORIENTATION: MID
ORIENTATION: MID
ORIENTATION: LEFT
ORIENTATION: MID
ORIENTATION: MID

## 2024-09-12 ASSESSMENT — PAIN SCALES - GENERAL
PAINLEVEL_OUTOF10: 3
PAINLEVEL_OUTOF10: 2
PAINLEVEL_OUTOF10: 8
PAINLEVEL_OUTOF10: 7
PAINLEVEL_OUTOF10: 10
PAINLEVEL_OUTOF10: 6
PAINLEVEL_OUTOF10: 7
PAINLEVEL_OUTOF10: 4
PAINLEVEL_OUTOF10: 7

## 2024-09-12 ASSESSMENT — PAIN DESCRIPTION - LOCATION
LOCATION: ABDOMEN
LOCATION: ABDOMEN
LOCATION: PELVIS
LOCATION: ABDOMEN

## 2024-09-12 ASSESSMENT — LIFESTYLE VARIABLES: HOW OFTEN DO YOU HAVE A DRINK CONTAINING ALCOHOL: NEVER

## 2024-09-12 ASSESSMENT — PAIN DESCRIPTION - FREQUENCY: FREQUENCY: CONTINUOUS

## 2024-09-12 ASSESSMENT — PAIN DESCRIPTION - PAIN TYPE: TYPE: ACUTE PAIN

## 2024-09-13 LAB
ANION GAP SERPL CALC-SCNC: 4 MMOL/L (ref 2–12)
BUN SERPL-MCNC: 18 MG/DL (ref 6–20)
BUN/CREAT SERPL: 24 (ref 12–20)
CALCIUM SERPL-MCNC: 8.6 MG/DL (ref 8.5–10.1)
CHLORIDE SERPL-SCNC: 104 MMOL/L (ref 97–108)
CO2 SERPL-SCNC: 28 MMOL/L (ref 21–32)
CREAT SERPL-MCNC: 0.74 MG/DL (ref 0.55–1.02)
ERYTHROCYTE [DISTWIDTH] IN BLOOD BY AUTOMATED COUNT: 11.7 % (ref 11.5–14.5)
GLUCOSE SERPL-MCNC: 117 MG/DL (ref 65–100)
HCT VFR BLD AUTO: 33.1 % (ref 35–47)
HGB BLD-MCNC: 11.1 G/DL (ref 11.5–16)
MCH RBC QN AUTO: 32.8 PG (ref 26–34)
MCHC RBC AUTO-ENTMCNC: 33.5 G/DL (ref 30–36.5)
MCV RBC AUTO: 97.9 FL (ref 80–99)
NRBC # BLD: 0 K/UL (ref 0–0.01)
NRBC BLD-RTO: 0 PER 100 WBC
PLATELET # BLD AUTO: 188 K/UL (ref 150–400)
PMV BLD AUTO: 11 FL (ref 8.9–12.9)
POTASSIUM SERPL-SCNC: 4.6 MMOL/L (ref 3.5–5.1)
RBC # BLD AUTO: 3.38 M/UL (ref 3.8–5.2)
SODIUM SERPL-SCNC: 136 MMOL/L (ref 136–145)
WBC # BLD AUTO: 15.6 K/UL (ref 3.6–11)

## 2024-09-13 PROCEDURE — 2580000003 HC RX 258: Performed by: SURGERY

## 2024-09-13 PROCEDURE — 6360000002 HC RX W HCPCS: Performed by: SURGERY

## 2024-09-13 PROCEDURE — 6370000000 HC RX 637 (ALT 250 FOR IP): Performed by: SURGERY

## 2024-09-13 PROCEDURE — 2580000003 HC RX 258

## 2024-09-13 PROCEDURE — 36415 COLL VENOUS BLD VENIPUNCTURE: CPT

## 2024-09-13 PROCEDURE — 80048 BASIC METABOLIC PNL TOTAL CA: CPT

## 2024-09-13 PROCEDURE — 85027 COMPLETE CBC AUTOMATED: CPT

## 2024-09-13 PROCEDURE — 1100000000 HC RM PRIVATE

## 2024-09-13 RX ORDER — KETOROLAC TROMETHAMINE 30 MG/ML
30 INJECTION, SOLUTION INTRAMUSCULAR; INTRAVENOUS EVERY 6 HOURS
Status: COMPLETED | OUTPATIENT
Start: 2024-09-13 | End: 2024-09-18

## 2024-09-13 RX ADMIN — SODIUM CHLORIDE, PRESERVATIVE FREE 10 ML: 5 INJECTION INTRAVENOUS at 20:28

## 2024-09-13 RX ADMIN — KETOROLAC TROMETHAMINE 30 MG: 30 INJECTION, SOLUTION INTRAMUSCULAR at 15:09

## 2024-09-13 RX ADMIN — OXYCODONE HYDROCHLORIDE 10 MG: 10 TABLET ORAL at 16:40

## 2024-09-13 RX ADMIN — ACETAMINOPHEN 1000 MG: 500 TABLET ORAL at 00:49

## 2024-09-13 RX ADMIN — PIPERACILLIN AND TAZOBACTAM 3375 MG: 3; .375 INJECTION, POWDER, LYOPHILIZED, FOR SOLUTION INTRAVENOUS at 07:59

## 2024-09-13 RX ADMIN — KETOROLAC TROMETHAMINE 30 MG: 30 INJECTION, SOLUTION INTRAMUSCULAR at 20:28

## 2024-09-13 RX ADMIN — ACETAMINOPHEN 1000 MG: 500 TABLET ORAL at 12:38

## 2024-09-13 RX ADMIN — PIPERACILLIN AND TAZOBACTAM 3375 MG: 3; .375 INJECTION, POWDER, LYOPHILIZED, FOR SOLUTION INTRAVENOUS at 00:55

## 2024-09-13 RX ADMIN — ACETAMINOPHEN 1000 MG: 500 TABLET ORAL at 05:50

## 2024-09-13 RX ADMIN — PIPERACILLIN AND TAZOBACTAM 3375 MG: 3; .375 INJECTION, POWDER, LYOPHILIZED, FOR SOLUTION INTRAVENOUS at 15:13

## 2024-09-13 RX ADMIN — SODIUM CHLORIDE, POTASSIUM CHLORIDE, SODIUM LACTATE AND CALCIUM CHLORIDE: 600; 310; 30; 20 INJECTION, SOLUTION INTRAVENOUS at 21:40

## 2024-09-13 RX ADMIN — HYDROMORPHONE HYDROCHLORIDE 0.5 MG: 1 INJECTION, SOLUTION INTRAMUSCULAR; INTRAVENOUS; SUBCUTANEOUS at 07:53

## 2024-09-13 RX ADMIN — ACETAMINOPHEN 1000 MG: 500 TABLET ORAL at 18:31

## 2024-09-13 RX ADMIN — SODIUM CHLORIDE, PRESERVATIVE FREE 10 ML: 5 INJECTION INTRAVENOUS at 08:02

## 2024-09-13 RX ADMIN — OXYCODONE HYDROCHLORIDE 10 MG: 10 TABLET ORAL at 10:50

## 2024-09-13 ASSESSMENT — PAIN DESCRIPTION - LOCATION
LOCATION: ABDOMEN

## 2024-09-13 ASSESSMENT — PAIN SCALES - GENERAL
PAINLEVEL_OUTOF10: 4
PAINLEVEL_OUTOF10: 8
PAINLEVEL_OUTOF10: 4
PAINLEVEL_OUTOF10: 7
PAINLEVEL_OUTOF10: 4
PAINLEVEL_OUTOF10: 5
PAINLEVEL_OUTOF10: 7

## 2024-09-13 ASSESSMENT — PAIN DESCRIPTION - DESCRIPTORS
DESCRIPTORS: SORE
DESCRIPTORS: SORE
DESCRIPTORS: PRESSURE;SORE
DESCRIPTORS: SHARP;SORE;PRESSURE

## 2024-09-13 ASSESSMENT — PAIN DESCRIPTION - ORIENTATION
ORIENTATION: LOWER
ORIENTATION: LOWER
ORIENTATION: LOWER;UPPER

## 2024-09-14 PROCEDURE — 94761 N-INVAS EAR/PLS OXIMETRY MLT: CPT

## 2024-09-14 PROCEDURE — 2580000003 HC RX 258: Performed by: SURGERY

## 2024-09-14 PROCEDURE — 6360000002 HC RX W HCPCS: Performed by: SURGERY

## 2024-09-14 PROCEDURE — 1100000000 HC RM PRIVATE

## 2024-09-14 PROCEDURE — 2580000003 HC RX 258

## 2024-09-14 PROCEDURE — 6370000000 HC RX 637 (ALT 250 FOR IP): Performed by: SURGERY

## 2024-09-14 RX ORDER — SIMETHICONE 80 MG
80 TABLET,CHEWABLE ORAL EVERY 6 HOURS PRN
Status: DISCONTINUED | OUTPATIENT
Start: 2024-09-14 | End: 2024-09-18 | Stop reason: HOSPADM

## 2024-09-14 RX ADMIN — ACETAMINOPHEN 1000 MG: 500 TABLET ORAL at 07:01

## 2024-09-14 RX ADMIN — KETOROLAC TROMETHAMINE 30 MG: 30 INJECTION, SOLUTION INTRAMUSCULAR at 00:51

## 2024-09-14 RX ADMIN — ACETAMINOPHEN 1000 MG: 500 TABLET ORAL at 18:32

## 2024-09-14 RX ADMIN — PIPERACILLIN AND TAZOBACTAM 3375 MG: 3; .375 INJECTION, POWDER, LYOPHILIZED, FOR SOLUTION INTRAVENOUS at 07:57

## 2024-09-14 RX ADMIN — SODIUM CHLORIDE, PRESERVATIVE FREE 10 ML: 5 INJECTION INTRAVENOUS at 20:12

## 2024-09-14 RX ADMIN — KETOROLAC TROMETHAMINE 30 MG: 30 INJECTION, SOLUTION INTRAMUSCULAR at 07:57

## 2024-09-14 RX ADMIN — KETOROLAC TROMETHAMINE 30 MG: 30 INJECTION, SOLUTION INTRAMUSCULAR at 15:44

## 2024-09-14 RX ADMIN — KETOROLAC TROMETHAMINE 30 MG: 30 INJECTION, SOLUTION INTRAMUSCULAR at 20:12

## 2024-09-14 RX ADMIN — OXYCODONE HYDROCHLORIDE 10 MG: 10 TABLET ORAL at 00:50

## 2024-09-14 RX ADMIN — ONDANSETRON 4 MG: 2 INJECTION INTRAMUSCULAR; INTRAVENOUS at 07:02

## 2024-09-14 RX ADMIN — PIPERACILLIN AND TAZOBACTAM 3375 MG: 3; .375 INJECTION, POWDER, LYOPHILIZED, FOR SOLUTION INTRAVENOUS at 00:30

## 2024-09-14 RX ADMIN — ACETAMINOPHEN 1000 MG: 500 TABLET ORAL at 13:03

## 2024-09-14 RX ADMIN — ACETAMINOPHEN 1000 MG: 500 TABLET ORAL at 00:51

## 2024-09-14 RX ADMIN — PIPERACILLIN AND TAZOBACTAM 3375 MG: 3; .375 INJECTION, POWDER, LYOPHILIZED, FOR SOLUTION INTRAVENOUS at 15:49

## 2024-09-14 RX ADMIN — OXYCODONE HYDROCHLORIDE 10 MG: 10 TABLET ORAL at 07:01

## 2024-09-14 RX ADMIN — OXYCODONE HYDROCHLORIDE 5 MG: 5 TABLET ORAL at 20:11

## 2024-09-14 RX ADMIN — OXYCODONE HYDROCHLORIDE 5 MG: 5 TABLET ORAL at 13:03

## 2024-09-14 RX ADMIN — SODIUM CHLORIDE, PRESERVATIVE FREE 10 ML: 5 INJECTION INTRAVENOUS at 07:59

## 2024-09-14 ASSESSMENT — PAIN SCALES - GENERAL
PAINLEVEL_OUTOF10: 5
PAINLEVEL_OUTOF10: 2
PAINLEVEL_OUTOF10: 3
PAINLEVEL_OUTOF10: 2
PAINLEVEL_OUTOF10: 8
PAINLEVEL_OUTOF10: 4
PAINLEVEL_OUTOF10: 6

## 2024-09-14 ASSESSMENT — PAIN DESCRIPTION - LOCATION
LOCATION: ABDOMEN
LOCATION: ABDOMEN
LOCATION: ABDOMEN;HEAD

## 2024-09-14 ASSESSMENT — PAIN DESCRIPTION - ORIENTATION
ORIENTATION: LOWER
ORIENTATION: LOWER

## 2024-09-14 ASSESSMENT — PAIN DESCRIPTION - DESCRIPTORS
DESCRIPTORS: ACHING;PRESSURE
DESCRIPTORS: ACHING
DESCRIPTORS: SHOOTING
DESCRIPTORS: PRESSURE;ACHING

## 2024-09-14 ASSESSMENT — PAIN - FUNCTIONAL ASSESSMENT: PAIN_FUNCTIONAL_ASSESSMENT: ACTIVITIES ARE NOT PREVENTED

## 2024-09-15 PROCEDURE — 1100000000 HC RM PRIVATE

## 2024-09-15 PROCEDURE — 2580000003 HC RX 258

## 2024-09-15 PROCEDURE — 6360000002 HC RX W HCPCS: Performed by: SURGERY

## 2024-09-15 PROCEDURE — 2580000003 HC RX 258: Performed by: SURGERY

## 2024-09-15 PROCEDURE — 94761 N-INVAS EAR/PLS OXIMETRY MLT: CPT

## 2024-09-15 PROCEDURE — 6370000000 HC RX 637 (ALT 250 FOR IP): Performed by: SURGERY

## 2024-09-15 RX ADMIN — ACETAMINOPHEN 1000 MG: 500 TABLET ORAL at 12:00

## 2024-09-15 RX ADMIN — PIPERACILLIN AND TAZOBACTAM 3375 MG: 3; .375 INJECTION, POWDER, LYOPHILIZED, FOR SOLUTION INTRAVENOUS at 16:20

## 2024-09-15 RX ADMIN — ACETAMINOPHEN 1000 MG: 500 TABLET ORAL at 06:10

## 2024-09-15 RX ADMIN — OXYCODONE HYDROCHLORIDE 10 MG: 10 TABLET ORAL at 12:00

## 2024-09-15 RX ADMIN — KETOROLAC TROMETHAMINE 30 MG: 30 INJECTION, SOLUTION INTRAMUSCULAR at 20:58

## 2024-09-15 RX ADMIN — PIPERACILLIN AND TAZOBACTAM 3375 MG: 3; .375 INJECTION, POWDER, LYOPHILIZED, FOR SOLUTION INTRAVENOUS at 00:24

## 2024-09-15 RX ADMIN — KETOROLAC TROMETHAMINE 30 MG: 30 INJECTION, SOLUTION INTRAMUSCULAR at 10:10

## 2024-09-15 RX ADMIN — OXYCODONE HYDROCHLORIDE 5 MG: 5 TABLET ORAL at 23:39

## 2024-09-15 RX ADMIN — SODIUM CHLORIDE, PRESERVATIVE FREE 10 ML: 5 INJECTION INTRAVENOUS at 21:04

## 2024-09-15 RX ADMIN — KETOROLAC TROMETHAMINE 30 MG: 30 INJECTION, SOLUTION INTRAMUSCULAR at 14:29

## 2024-09-15 RX ADMIN — PIPERACILLIN AND TAZOBACTAM 3375 MG: 3; .375 INJECTION, POWDER, LYOPHILIZED, FOR SOLUTION INTRAVENOUS at 10:17

## 2024-09-15 RX ADMIN — ACETAMINOPHEN 1000 MG: 500 TABLET ORAL at 18:13

## 2024-09-15 RX ADMIN — SIMETHICONE 80 MG: 80 TABLET, CHEWABLE ORAL at 00:19

## 2024-09-15 RX ADMIN — KETOROLAC TROMETHAMINE 30 MG: 30 INJECTION, SOLUTION INTRAMUSCULAR at 02:11

## 2024-09-15 RX ADMIN — OXYCODONE HYDROCHLORIDE 5 MG: 5 TABLET ORAL at 00:35

## 2024-09-15 RX ADMIN — ACETAMINOPHEN 1000 MG: 500 TABLET ORAL at 23:39

## 2024-09-15 RX ADMIN — SIMETHICONE 80 MG: 80 TABLET, CHEWABLE ORAL at 06:10

## 2024-09-15 RX ADMIN — PIPERACILLIN AND TAZOBACTAM 3375 MG: 3; .375 INJECTION, POWDER, LYOPHILIZED, FOR SOLUTION INTRAVENOUS at 23:44

## 2024-09-15 RX ADMIN — OXYCODONE HYDROCHLORIDE 10 MG: 10 TABLET ORAL at 16:16

## 2024-09-15 ASSESSMENT — PAIN SCALES - GENERAL
PAINLEVEL_OUTOF10: 2
PAINLEVEL_OUTOF10: 7
PAINLEVEL_OUTOF10: 7
PAINLEVEL_OUTOF10: 5
PAINLEVEL_OUTOF10: 6
PAINLEVEL_OUTOF10: 5

## 2024-09-15 ASSESSMENT — PAIN DESCRIPTION - DESCRIPTORS
DESCRIPTORS: ACHING
DESCRIPTORS: ACHING;THROBBING;JABBING

## 2024-09-15 ASSESSMENT — PAIN DESCRIPTION - ORIENTATION
ORIENTATION: MID
ORIENTATION: MID

## 2024-09-15 ASSESSMENT — PAIN DESCRIPTION - LOCATION
LOCATION: ABDOMEN;HEAD
LOCATION: ABDOMEN;HEAD

## 2024-09-16 LAB
ANION GAP SERPL CALC-SCNC: 5 MMOL/L (ref 2–12)
BASOPHILS # BLD: 0.1 K/UL (ref 0–0.1)
BASOPHILS NFR BLD: 1 % (ref 0–1)
BUN SERPL-MCNC: 19 MG/DL (ref 6–20)
BUN/CREAT SERPL: 15 (ref 12–20)
CALCIUM SERPL-MCNC: 8.8 MG/DL (ref 8.5–10.1)
CHLORIDE SERPL-SCNC: 103 MMOL/L (ref 97–108)
CO2 SERPL-SCNC: 29 MMOL/L (ref 21–32)
CREAT SERPL-MCNC: 1.27 MG/DL (ref 0.55–1.02)
DIFFERENTIAL METHOD BLD: ABNORMAL
EOSINOPHIL # BLD: 0.3 K/UL (ref 0–0.4)
EOSINOPHIL NFR BLD: 3 % (ref 0–7)
ERYTHROCYTE [DISTWIDTH] IN BLOOD BY AUTOMATED COUNT: 11.6 % (ref 11.5–14.5)
GLUCOSE SERPL-MCNC: 111 MG/DL (ref 65–100)
HCT VFR BLD AUTO: 33.4 % (ref 35–47)
HGB BLD-MCNC: 11.1 G/DL (ref 11.5–16)
IMM GRANULOCYTES # BLD AUTO: 0.1 K/UL (ref 0–0.04)
IMM GRANULOCYTES NFR BLD AUTO: 1 % (ref 0–0.5)
LYMPHOCYTES # BLD: 1.7 K/UL (ref 0.8–3.5)
LYMPHOCYTES NFR BLD: 17 % (ref 12–49)
MCH RBC QN AUTO: 32.1 PG (ref 26–34)
MCHC RBC AUTO-ENTMCNC: 33.2 G/DL (ref 30–36.5)
MCV RBC AUTO: 96.5 FL (ref 80–99)
MONOCYTES # BLD: 0.7 K/UL (ref 0–1)
MONOCYTES NFR BLD: 7 % (ref 5–13)
NEUTS SEG # BLD: 7.1 K/UL (ref 1.8–8)
NEUTS SEG NFR BLD: 71 % (ref 32–75)
NRBC # BLD: 0 K/UL (ref 0–0.01)
NRBC BLD-RTO: 0 PER 100 WBC
PLATELET # BLD AUTO: 250 K/UL (ref 150–400)
PMV BLD AUTO: 10.4 FL (ref 8.9–12.9)
POTASSIUM SERPL-SCNC: 4 MMOL/L (ref 3.5–5.1)
RBC # BLD AUTO: 3.46 M/UL (ref 3.8–5.2)
SODIUM SERPL-SCNC: 137 MMOL/L (ref 136–145)
WBC # BLD AUTO: 9.9 K/UL (ref 3.6–11)

## 2024-09-16 PROCEDURE — 2580000003 HC RX 258

## 2024-09-16 PROCEDURE — 6360000002 HC RX W HCPCS: Performed by: SURGERY

## 2024-09-16 PROCEDURE — 2580000003 HC RX 258: Performed by: SURGERY

## 2024-09-16 PROCEDURE — 6370000000 HC RX 637 (ALT 250 FOR IP): Performed by: SURGERY

## 2024-09-16 PROCEDURE — 85025 COMPLETE CBC W/AUTO DIFF WBC: CPT

## 2024-09-16 PROCEDURE — 94761 N-INVAS EAR/PLS OXIMETRY MLT: CPT

## 2024-09-16 PROCEDURE — 6360000002 HC RX W HCPCS

## 2024-09-16 PROCEDURE — 80048 BASIC METABOLIC PNL TOTAL CA: CPT

## 2024-09-16 PROCEDURE — 36415 COLL VENOUS BLD VENIPUNCTURE: CPT

## 2024-09-16 PROCEDURE — 1100000000 HC RM PRIVATE

## 2024-09-16 PROCEDURE — 6370000000 HC RX 637 (ALT 250 FOR IP)

## 2024-09-16 RX ADMIN — KETOROLAC TROMETHAMINE 30 MG: 30 INJECTION, SOLUTION INTRAMUSCULAR at 20:28

## 2024-09-16 RX ADMIN — PIPERACILLIN AND TAZOBACTAM 3375 MG: 3; .375 INJECTION, POWDER, LYOPHILIZED, FOR SOLUTION INTRAVENOUS at 15:59

## 2024-09-16 RX ADMIN — ACETAMINOPHEN 1000 MG: 500 TABLET ORAL at 06:39

## 2024-09-16 RX ADMIN — KETOROLAC TROMETHAMINE 30 MG: 30 INJECTION, SOLUTION INTRAMUSCULAR at 14:00

## 2024-09-16 RX ADMIN — PIPERACILLIN AND TAZOBACTAM 3375 MG: 3; .375 INJECTION, POWDER, LYOPHILIZED, FOR SOLUTION INTRAVENOUS at 09:12

## 2024-09-16 RX ADMIN — KETOROLAC TROMETHAMINE 30 MG: 30 INJECTION, SOLUTION INTRAMUSCULAR at 02:38

## 2024-09-16 RX ADMIN — KETOROLAC TROMETHAMINE 30 MG: 30 INJECTION, SOLUTION INTRAMUSCULAR at 09:09

## 2024-09-16 RX ADMIN — ACETAMINOPHEN 1000 MG: 500 TABLET ORAL at 12:54

## 2024-09-16 RX ADMIN — ACETAMINOPHEN 1000 MG: 500 TABLET ORAL at 18:04

## 2024-09-16 RX ADMIN — FLUOXETINE HYDROCHLORIDE 20 MG: 20 CAPSULE ORAL at 14:01

## 2024-09-16 RX ADMIN — OXYCODONE HYDROCHLORIDE 5 MG: 5 TABLET ORAL at 13:59

## 2024-09-16 ASSESSMENT — PAIN DESCRIPTION - ORIENTATION: ORIENTATION: ANTERIOR

## 2024-09-16 ASSESSMENT — PAIN DESCRIPTION - LOCATION: LOCATION: ABDOMEN

## 2024-09-16 ASSESSMENT — PAIN DESCRIPTION - DESCRIPTORS: DESCRIPTORS: SHARP

## 2024-09-16 ASSESSMENT — PAIN SCALES - GENERAL
PAINLEVEL_OUTOF10: 5
PAINLEVEL_OUTOF10: 6

## 2024-09-17 ENCOUNTER — APPOINTMENT (OUTPATIENT)
Facility: HOSPITAL | Age: 49
DRG: 330 | End: 2024-09-17
Payer: COMMERCIAL

## 2024-09-17 PROCEDURE — 6360000002 HC RX W HCPCS: Performed by: SURGERY

## 2024-09-17 PROCEDURE — 6360000002 HC RX W HCPCS

## 2024-09-17 PROCEDURE — 6370000000 HC RX 637 (ALT 250 FOR IP)

## 2024-09-17 PROCEDURE — 6370000000 HC RX 637 (ALT 250 FOR IP): Performed by: SURGERY

## 2024-09-17 PROCEDURE — 1100000000 HC RM PRIVATE

## 2024-09-17 PROCEDURE — 2580000003 HC RX 258

## 2024-09-17 PROCEDURE — 74018 RADEX ABDOMEN 1 VIEW: CPT

## 2024-09-17 PROCEDURE — 94761 N-INVAS EAR/PLS OXIMETRY MLT: CPT

## 2024-09-17 RX ORDER — DICYCLOMINE HCL 20 MG
20 TABLET ORAL 3 TIMES DAILY
Status: DISCONTINUED | OUTPATIENT
Start: 2024-09-17 | End: 2024-09-18 | Stop reason: HOSPADM

## 2024-09-17 RX ORDER — DIAZEPAM 2 MG
2 TABLET ORAL EVERY 6 HOURS PRN
Status: DISCONTINUED | OUTPATIENT
Start: 2024-09-17 | End: 2024-09-18 | Stop reason: HOSPADM

## 2024-09-17 RX ORDER — POLYETHYLENE GLYCOL 3350 17 G/17G
17 POWDER, FOR SOLUTION ORAL DAILY
Status: DISCONTINUED | OUTPATIENT
Start: 2024-09-17 | End: 2024-09-18 | Stop reason: HOSPADM

## 2024-09-17 RX ORDER — DOCUSATE SODIUM 100 MG/1
100 CAPSULE, LIQUID FILLED ORAL 2 TIMES DAILY
Status: DISCONTINUED | OUTPATIENT
Start: 2024-09-17 | End: 2024-09-18 | Stop reason: HOSPADM

## 2024-09-17 RX ADMIN — PIPERACILLIN AND TAZOBACTAM 3375 MG: 3; .375 INJECTION, POWDER, LYOPHILIZED, FOR SOLUTION INTRAVENOUS at 17:15

## 2024-09-17 RX ADMIN — PIPERACILLIN AND TAZOBACTAM 3375 MG: 3; .375 INJECTION, POWDER, LYOPHILIZED, FOR SOLUTION INTRAVENOUS at 08:41

## 2024-09-17 RX ADMIN — DICYCLOMINE HYDROCHLORIDE 20 MG: 20 TABLET ORAL at 14:34

## 2024-09-17 RX ADMIN — FLUOXETINE HYDROCHLORIDE 20 MG: 20 CAPSULE ORAL at 08:37

## 2024-09-17 RX ADMIN — DIAZEPAM 2 MG: 2 TABLET ORAL at 14:40

## 2024-09-17 RX ADMIN — SIMETHICONE 80 MG: 80 TABLET, CHEWABLE ORAL at 12:40

## 2024-09-17 RX ADMIN — SIMETHICONE 80 MG: 80 TABLET, CHEWABLE ORAL at 20:10

## 2024-09-17 RX ADMIN — ACETAMINOPHEN 1000 MG: 500 TABLET ORAL at 12:41

## 2024-09-17 RX ADMIN — DIAZEPAM 2 MG: 2 TABLET ORAL at 20:10

## 2024-09-17 RX ADMIN — KETOROLAC TROMETHAMINE 30 MG: 30 INJECTION, SOLUTION INTRAMUSCULAR at 08:38

## 2024-09-17 RX ADMIN — KETOROLAC TROMETHAMINE 30 MG: 30 INJECTION, SOLUTION INTRAMUSCULAR at 14:34

## 2024-09-17 RX ADMIN — KETOROLAC TROMETHAMINE 30 MG: 30 INJECTION, SOLUTION INTRAMUSCULAR at 02:07

## 2024-09-17 RX ADMIN — OXYCODONE HYDROCHLORIDE 5 MG: 5 TABLET ORAL at 05:51

## 2024-09-17 RX ADMIN — PIPERACILLIN AND TAZOBACTAM 3375 MG: 3; .375 INJECTION, POWDER, LYOPHILIZED, FOR SOLUTION INTRAVENOUS at 00:33

## 2024-09-17 RX ADMIN — KETOROLAC TROMETHAMINE 30 MG: 30 INJECTION, SOLUTION INTRAMUSCULAR at 20:10

## 2024-09-17 RX ADMIN — ACETAMINOPHEN 1000 MG: 500 TABLET ORAL at 17:13

## 2024-09-17 RX ADMIN — SODIUM CHLORIDE, PRESERVATIVE FREE 10 ML: 5 INJECTION INTRAVENOUS at 20:15

## 2024-09-17 RX ADMIN — DICYCLOMINE HYDROCHLORIDE 20 MG: 20 TABLET ORAL at 20:11

## 2024-09-17 RX ADMIN — ACETAMINOPHEN 1000 MG: 500 TABLET ORAL at 00:31

## 2024-09-17 RX ADMIN — OXYCODONE HYDROCHLORIDE 10 MG: 10 TABLET ORAL at 00:31

## 2024-09-17 RX ADMIN — SIMETHICONE 80 MG: 80 TABLET, CHEWABLE ORAL at 05:51

## 2024-09-17 RX ADMIN — SIMETHICONE 80 MG: 80 TABLET, CHEWABLE ORAL at 00:31

## 2024-09-17 ASSESSMENT — PAIN DESCRIPTION - ORIENTATION
ORIENTATION: ANTERIOR

## 2024-09-17 ASSESSMENT — PAIN SCALES - GENERAL
PAINLEVEL_OUTOF10: 9
PAINLEVEL_OUTOF10: 4
PAINLEVEL_OUTOF10: 6
PAINLEVEL_OUTOF10: 6

## 2024-09-17 ASSESSMENT — PAIN DESCRIPTION - LOCATION
LOCATION: ABDOMEN

## 2024-09-17 ASSESSMENT — PAIN DESCRIPTION - DESCRIPTORS
DESCRIPTORS: CRAMPING

## 2024-09-18 ENCOUNTER — APPOINTMENT (OUTPATIENT)
Facility: HOSPITAL | Age: 49
DRG: 330 | End: 2024-09-18
Payer: COMMERCIAL

## 2024-09-18 VITALS
TEMPERATURE: 98.6 F | HEIGHT: 63 IN | DIASTOLIC BLOOD PRESSURE: 73 MMHG | WEIGHT: 140 LBS | OXYGEN SATURATION: 96 % | SYSTOLIC BLOOD PRESSURE: 116 MMHG | BODY MASS INDEX: 24.8 KG/M2 | RESPIRATION RATE: 18 BRPM | HEART RATE: 69 BPM

## 2024-09-18 PROCEDURE — 6370000000 HC RX 637 (ALT 250 FOR IP): Performed by: SURGERY

## 2024-09-18 PROCEDURE — 0DBN0ZZ EXCISION OF SIGMOID COLON, OPEN APPROACH: ICD-10-PCS | Performed by: SURGERY

## 2024-09-18 PROCEDURE — 6370000000 HC RX 637 (ALT 250 FOR IP)

## 2024-09-18 PROCEDURE — 6360000002 HC RX W HCPCS: Performed by: SURGERY

## 2024-09-18 PROCEDURE — 74018 RADEX ABDOMEN 1 VIEW: CPT

## 2024-09-18 PROCEDURE — 2580000003 HC RX 258

## 2024-09-18 PROCEDURE — 94761 N-INVAS EAR/PLS OXIMETRY MLT: CPT

## 2024-09-18 RX ORDER — DICYCLOMINE HCL 20 MG
20 TABLET ORAL 3 TIMES DAILY PRN
Qty: 42 TABLET | Refills: 0 | Status: SHIPPED | OUTPATIENT
Start: 2024-09-18 | End: 2024-10-02

## 2024-09-18 RX ORDER — OXYCODONE HYDROCHLORIDE 5 MG/1
5 TABLET ORAL EVERY 6 HOURS PRN
Qty: 12 TABLET | Refills: 0 | Status: SHIPPED | OUTPATIENT
Start: 2024-09-18 | End: 2024-09-21

## 2024-09-18 RX ORDER — ONDANSETRON 4 MG/1
4 TABLET, FILM COATED ORAL 3 TIMES DAILY PRN
Qty: 15 TABLET | Refills: 0 | Status: SHIPPED | OUTPATIENT
Start: 2024-09-18

## 2024-09-18 RX ADMIN — ACETAMINOPHEN 1000 MG: 500 TABLET ORAL at 05:56

## 2024-09-18 RX ADMIN — DICYCLOMINE HYDROCHLORIDE 20 MG: 20 TABLET ORAL at 08:12

## 2024-09-18 RX ADMIN — SODIUM CHLORIDE, PRESERVATIVE FREE 10 ML: 5 INJECTION INTRAVENOUS at 08:14

## 2024-09-18 RX ADMIN — KETOROLAC TROMETHAMINE 30 MG: 30 INJECTION, SOLUTION INTRAMUSCULAR at 08:13

## 2024-09-18 RX ADMIN — FLUOXETINE HYDROCHLORIDE 20 MG: 20 CAPSULE ORAL at 08:13

## 2024-09-18 RX ADMIN — SIMETHICONE 80 MG: 80 TABLET, CHEWABLE ORAL at 05:57

## 2024-09-18 RX ADMIN — ACETAMINOPHEN 1000 MG: 500 TABLET ORAL at 12:19

## 2024-09-18 RX ADMIN — KETOROLAC TROMETHAMINE 30 MG: 30 INJECTION, SOLUTION INTRAMUSCULAR at 01:01

## 2024-10-14 ENCOUNTER — APPOINTMENT (OUTPATIENT)
Facility: HOSPITAL | Age: 49
End: 2024-10-14
Payer: COMMERCIAL

## 2024-10-14 ENCOUNTER — HOSPITAL ENCOUNTER (EMERGENCY)
Facility: HOSPITAL | Age: 49
Discharge: HOME | End: 2024-10-14
Attending: EMERGENCY MEDICINE
Payer: COMMERCIAL

## 2024-10-14 VITALS
HEART RATE: 100 BPM | SYSTOLIC BLOOD PRESSURE: 149 MMHG | DIASTOLIC BLOOD PRESSURE: 91 MMHG | OXYGEN SATURATION: 99 % | RESPIRATION RATE: 20 BRPM | TEMPERATURE: 97.6 F

## 2024-10-14 DIAGNOSIS — G89.18 POSTOPERATIVE ABDOMINAL PAIN: Primary | ICD-10-CM

## 2024-10-14 DIAGNOSIS — R10.9 POSTOPERATIVE ABDOMINAL PAIN: Primary | ICD-10-CM

## 2024-10-14 DIAGNOSIS — R07.9 CHEST PAIN, UNSPECIFIED TYPE: ICD-10-CM

## 2024-10-14 DIAGNOSIS — R19.7 DIARRHEA, UNSPECIFIED TYPE: ICD-10-CM

## 2024-10-14 LAB
ALBUMIN SERPL-MCNC: 3.9 G/DL (ref 3.5–5)
ALBUMIN/GLOB SERPL: 1.1 (ref 1.1–2.2)
ALP SERPL-CCNC: 78 U/L (ref 45–117)
ALT SERPL-CCNC: 13 U/L (ref 12–78)
ANION GAP SERPL CALC-SCNC: 4 MMOL/L (ref 2–12)
AST SERPL-CCNC: 11 U/L (ref 15–37)
BASOPHILS # BLD: 0.1 K/UL (ref 0–0.1)
BASOPHILS NFR BLD: 1 % (ref 0–1)
BILIRUB SERPL-MCNC: 0.3 MG/DL (ref 0.2–1)
BUN SERPL-MCNC: 19 MG/DL (ref 6–20)
BUN/CREAT SERPL: 19 (ref 12–20)
CALCIUM SERPL-MCNC: 10 MG/DL (ref 8.5–10.1)
CHLORIDE SERPL-SCNC: 109 MMOL/L (ref 97–108)
CO2 SERPL-SCNC: 27 MMOL/L (ref 21–32)
CREAT SERPL-MCNC: 0.98 MG/DL (ref 0.55–1.02)
D DIMER PPP FEU-MCNC: 1.19 MG/L FEU (ref 0–0.65)
DIFFERENTIAL METHOD BLD: NORMAL
EKG ATRIAL RATE: 95 BPM
EKG DIAGNOSIS: NORMAL
EKG P AXIS: 83 DEGREES
EKG P-R INTERVAL: 142 MS
EKG Q-T INTERVAL: 344 MS
EKG QRS DURATION: 76 MS
EKG QTC CALCULATION (BAZETT): 432 MS
EKG R AXIS: 86 DEGREES
EKG T AXIS: 89 DEGREES
EKG VENTRICULAR RATE: 95 BPM
EOSINOPHIL # BLD: 0.2 K/UL (ref 0–0.4)
EOSINOPHIL NFR BLD: 2 % (ref 0–7)
ERYTHROCYTE [DISTWIDTH] IN BLOOD BY AUTOMATED COUNT: 13.2 % (ref 11.5–14.5)
GLOBULIN SER CALC-MCNC: 3.6 G/DL (ref 2–4)
GLUCOSE SERPL-MCNC: 131 MG/DL (ref 65–100)
HCG, URINE, POC: NEGATIVE
HCT VFR BLD AUTO: 37.7 % (ref 35–47)
HGB BLD-MCNC: 12.7 G/DL (ref 11.5–16)
IMM GRANULOCYTES # BLD AUTO: 0 K/UL (ref 0–0.04)
IMM GRANULOCYTES NFR BLD AUTO: 0 % (ref 0–0.5)
LIPASE SERPL-CCNC: 50 U/L (ref 13–75)
LYMPHOCYTES # BLD: 2.2 K/UL (ref 0.8–3.5)
LYMPHOCYTES NFR BLD: 27 % (ref 12–49)
Lab: NORMAL
MCH RBC QN AUTO: 32.5 PG (ref 26–34)
MCHC RBC AUTO-ENTMCNC: 33.7 G/DL (ref 30–36.5)
MCV RBC AUTO: 96.4 FL (ref 80–99)
MONOCYTES # BLD: 0.7 K/UL (ref 0–1)
MONOCYTES NFR BLD: 8 % (ref 5–13)
NEGATIVE QC PASS/FAIL: NORMAL
NEUTS SEG # BLD: 5.2 K/UL (ref 1.8–8)
NEUTS SEG NFR BLD: 62 % (ref 32–75)
NRBC # BLD: 0 K/UL (ref 0–0.01)
NRBC BLD-RTO: 0 PER 100 WBC
PLATELET # BLD AUTO: 304 K/UL (ref 150–400)
PMV BLD AUTO: 11 FL (ref 8.9–12.9)
POSITIVE QC PASS/FAIL: NORMAL
POTASSIUM SERPL-SCNC: 3.5 MMOL/L (ref 3.5–5.1)
PROT SERPL-MCNC: 7.5 G/DL (ref 6.4–8.2)
RBC # BLD AUTO: 3.91 M/UL (ref 3.8–5.2)
SODIUM SERPL-SCNC: 140 MMOL/L (ref 136–145)
TROPONIN I SERPL HS-MCNC: <4 NG/L (ref 0–51)
WBC # BLD AUTO: 8.3 K/UL (ref 3.6–11)

## 2024-10-14 PROCEDURE — 2580000003 HC RX 258: Performed by: STUDENT IN AN ORGANIZED HEALTH CARE EDUCATION/TRAINING PROGRAM

## 2024-10-14 PROCEDURE — 84484 ASSAY OF TROPONIN QUANT: CPT

## 2024-10-14 PROCEDURE — 85379 FIBRIN DEGRADATION QUANT: CPT

## 2024-10-14 PROCEDURE — 6360000004 HC RX CONTRAST MEDICATION: Performed by: NURSE PRACTITIONER

## 2024-10-14 PROCEDURE — 94761 N-INVAS EAR/PLS OXIMETRY MLT: CPT

## 2024-10-14 PROCEDURE — 85025 COMPLETE CBC W/AUTO DIFF WBC: CPT

## 2024-10-14 PROCEDURE — 83690 ASSAY OF LIPASE: CPT

## 2024-10-14 PROCEDURE — 6360000002 HC RX W HCPCS: Performed by: STUDENT IN AN ORGANIZED HEALTH CARE EDUCATION/TRAINING PROGRAM

## 2024-10-14 PROCEDURE — 96374 THER/PROPH/DIAG INJ IV PUSH: CPT

## 2024-10-14 PROCEDURE — 74177 CT ABD & PELVIS W/CONTRAST: CPT

## 2024-10-14 PROCEDURE — 80053 COMPREHEN METABOLIC PANEL: CPT

## 2024-10-14 PROCEDURE — 36415 COLL VENOUS BLD VENIPUNCTURE: CPT

## 2024-10-14 PROCEDURE — 93010 ELECTROCARDIOGRAM REPORT: CPT | Performed by: SPECIALIST

## 2024-10-14 PROCEDURE — 71275 CT ANGIOGRAPHY CHEST: CPT

## 2024-10-14 PROCEDURE — 93005 ELECTROCARDIOGRAM TRACING: CPT | Performed by: NURSE PRACTITIONER

## 2024-10-14 PROCEDURE — 99285 EMERGENCY DEPT VISIT HI MDM: CPT

## 2024-10-14 RX ORDER — 0.9 % SODIUM CHLORIDE 0.9 %
1000 INTRAVENOUS SOLUTION INTRAVENOUS ONCE
Status: COMPLETED | OUTPATIENT
Start: 2024-10-14 | End: 2024-10-14

## 2024-10-14 RX ORDER — IOPAMIDOL 755 MG/ML
100 INJECTION, SOLUTION INTRAVASCULAR
Status: COMPLETED | OUTPATIENT
Start: 2024-10-14 | End: 2024-10-14

## 2024-10-14 RX ORDER — KETOROLAC TROMETHAMINE 30 MG/ML
30 INJECTION, SOLUTION INTRAMUSCULAR; INTRAVENOUS ONCE
Status: COMPLETED | OUTPATIENT
Start: 2024-10-14 | End: 2024-10-14

## 2024-10-14 RX ADMIN — IOPAMIDOL 100 ML: 755 INJECTION, SOLUTION INTRAVENOUS at 17:19

## 2024-10-14 RX ADMIN — SODIUM CHLORIDE 1000 ML: 9 INJECTION, SOLUTION INTRAVENOUS at 17:55

## 2024-10-14 RX ADMIN — KETOROLAC TROMETHAMINE 30 MG: 30 INJECTION, SOLUTION INTRAMUSCULAR at 17:55

## 2024-10-14 ASSESSMENT — ENCOUNTER SYMPTOMS
EYE PAIN: 0
COLOR CHANGE: 0
ABDOMINAL DISTENTION: 0
COUGH: 0
SHORTNESS OF BREATH: 1
EYE REDNESS: 0
SINUS PAIN: 0
ABDOMINAL PAIN: 1
NAUSEA: 0
VOMITING: 0
SINUS PRESSURE: 0

## 2024-10-14 ASSESSMENT — PAIN - FUNCTIONAL ASSESSMENT: PAIN_FUNCTIONAL_ASSESSMENT: 0-10

## 2024-10-14 ASSESSMENT — PAIN DESCRIPTION - LOCATION: LOCATION: CHEST

## 2024-10-14 ASSESSMENT — PAIN DESCRIPTION - DESCRIPTORS: DESCRIPTORS: PRESSURE

## 2024-10-14 ASSESSMENT — PAIN SCALES - GENERAL: PAINLEVEL_OUTOF10: 4

## 2024-10-14 ASSESSMENT — PAIN DESCRIPTION - ORIENTATION: ORIENTATION: MID

## 2024-10-14 NOTE — ED NOTES
Pt ambulatory upon dc. IV removed. Instructed to return if s/s worsen and follow up w surgery, PCP.

## 2024-10-14 NOTE — ED TRIAGE NOTES
Pt arrives to ED via POV ambulatory c/o rectal bleeding, diarrhea, SOB, and elevated HR starting today. Pt reports colon resection 4 weeks ago. Provider in triage.

## 2024-10-14 NOTE — ED PROVIDER NOTES
ED SIGN OUT NOTE  Care assumed at [facility] 4:39 PM EDT    Patient was signed out to me by Milana Sylvester NP     Patient is a 50 yo female with history of exploratory laparotomy with sigmoid colectomy by Dr. Templeton on 9/12/24 who presents with multiple complaints. Reports she was initially doing well post operatively. Last week developed decreased appetite, fatigue, diarrhea 6-7 episodes per day. Reports today she noticed bright red blood when she wiped. She also developed chest pain described as tightness, shortness of breath and palpitations, HR 120BPM while sitting on couch.     BP (!) 149/91   Pulse 100   Temp 97.6 °F (36.4 °C) (Oral)   Resp 20   SpO2 99%     Labs Reviewed   COMPREHENSIVE METABOLIC PANEL - Abnormal; Notable for the following components:       Result Value    Chloride 109 (*)     Glucose 131 (*)     AST 11 (*)     All other components within normal limits   POC PREGNANCY UR-QUAL - Normal   CBC WITH AUTO DIFFERENTIAL   LIPASE   TROPONIN   URINALYSIS WITH MICROSCOPIC   D-DIMER, QUANTITATIVE     CT ABDOMEN PELVIS W IV CONTRAST Additional Contrast? None   Final Result   Status post partial colon resection. No unusual postsurgical findings.    No evidence of pulmonary embolism.   No aortic aneurysm or dissection.      No acute process is identified in the chest, abdomen or pelvis.   Additional incidental/nonemergent findings are as described above.      Electronically signed by SABINE MOBLEY      CTA CHEST W WO CONTRAST   Final Result   Status post partial colon resection. No unusual postsurgical findings.    No evidence of pulmonary embolism.   No aortic aneurysm or dissection.      No acute process is identified in the chest, abdomen or pelvis.   Additional incidental/nonemergent findings are as described above.      Electronically signed by SABINE MOBLEY          ED Course as of 10/14/24 1818   Mon Oct 14, 2024   1620 EKG interpreted by me. Shows a normal sinus rhythm with a heart 
unremarkable.  No leukocytosis.  Hemoglobin within normal limits.  Lipase negative.  Troponin is negative.  CT of the abdomen and pelvis is pending.    Amount and/or Complexity of Data Reviewed  Labs: ordered. Decision-making details documented in ED Course.  Radiology: ordered.  ECG/medicine tests: ordered.    Risk  Prescription drug management.        REASSESSMENT     ED Course as of 10/14/24 1647   Mon Oct 14, 2024   1620 EKG interpreted by me. Shows a normal sinus rhythm with a heart rate of 95. No ST elevations or depressions concerning for ischemia. Normal intervals.     [CK]   1638 WBC: 8.3 [KG]   1638 Hemoglobin Quant: 12.7 [KG]   1638 Hematocrit: 37.7 [KG]      ED Course User Index  [CK] Char Gonzalez S, DO  [KG] Denisse Rosario PA         CONSULTS:  None    PROCEDURES:     Procedures    1640:   Change of shift.  Care of patient signed over to AYSHA Davila.  Bedside handoff complete. Awaiting imaging.       (Please note that portions of this note were completed with a voice recognition program.  Efforts were made to edit the dictations but occasionally words are mis-transcribed.)             Milana Sylvester, APRN - NP  10/14/24 1706

## 2024-10-14 NOTE — DISCHARGE INSTRUCTIONS
Your workup today was reassuring.  Recommend close follow-up with your primary care physician as well as your surgeon.  Take Bentyl to help with abdominal pain as needed.  If you develop new or worsening symptoms return to the ER.

## 2025-04-05 ENCOUNTER — APPOINTMENT (OUTPATIENT)
Facility: HOSPITAL | Age: 50
End: 2025-04-05
Payer: COMMERCIAL

## 2025-04-05 ENCOUNTER — HOSPITAL ENCOUNTER (INPATIENT)
Facility: HOSPITAL | Age: 50
LOS: 7 days | Discharge: HOME | End: 2025-04-12
Attending: EMERGENCY MEDICINE | Admitting: INTERNAL MEDICINE
Payer: COMMERCIAL

## 2025-04-05 DIAGNOSIS — R10.9 INTRACTABLE ABDOMINAL PAIN: Primary | ICD-10-CM

## 2025-04-05 LAB
ALBUMIN SERPL-MCNC: 3.7 G/DL (ref 3.5–5)
ALBUMIN/GLOB SERPL: 1.2 (ref 1.1–2.2)
ALP SERPL-CCNC: 68 U/L (ref 45–117)
ALT SERPL-CCNC: 16 U/L (ref 12–78)
ANION GAP SERPL CALC-SCNC: 8 MMOL/L (ref 2–12)
APPEARANCE UR: CLEAR
AST SERPL-CCNC: 12 U/L (ref 15–37)
BACTERIA URNS QL MICRO: ABNORMAL /HPF
BASOPHILS # BLD: 0.01 K/UL (ref 0–0.1)
BASOPHILS NFR BLD: 0.1 % (ref 0–1)
BILIRUB SERPL-MCNC: 0.3 MG/DL (ref 0.2–1)
BILIRUB UR QL: NEGATIVE
BUN SERPL-MCNC: 21 MG/DL (ref 6–20)
BUN/CREAT SERPL: 21 (ref 12–20)
CALCIUM SERPL-MCNC: 9 MG/DL (ref 8.5–10.1)
CAOX CRY URNS QL MICRO: ABNORMAL
CHLORIDE SERPL-SCNC: 109 MMOL/L (ref 97–108)
CO2 SERPL-SCNC: 25 MMOL/L (ref 21–32)
COLOR UR: ABNORMAL
COMMENT:: NORMAL
CREAT SERPL-MCNC: 1.02 MG/DL (ref 0.55–1.02)
DIFFERENTIAL METHOD BLD: NORMAL
EOSINOPHIL # BLD: 0.05 K/UL (ref 0–0.4)
EOSINOPHIL NFR BLD: 0.7 % (ref 0–7)
EPITH CASTS URNS QL MICRO: ABNORMAL /LPF
ERYTHROCYTE [DISTWIDTH] IN BLOOD BY AUTOMATED COUNT: 12.4 % (ref 11.5–14.5)
GLOBULIN SER CALC-MCNC: 3.2 G/DL (ref 2–4)
GLUCOSE SERPL-MCNC: 108 MG/DL (ref 65–100)
GLUCOSE UR STRIP.AUTO-MCNC: NEGATIVE MG/DL
HCT VFR BLD AUTO: 40.6 % (ref 35–47)
HGB BLD-MCNC: 13.7 G/DL (ref 11.5–16)
HGB UR QL STRIP: NEGATIVE
IMM GRANULOCYTES # BLD AUTO: 0.02 K/UL (ref 0–0.04)
IMM GRANULOCYTES NFR BLD AUTO: 0.3 % (ref 0–0.5)
KETONES UR QL STRIP.AUTO: NEGATIVE MG/DL
LEUKOCYTE ESTERASE UR QL STRIP.AUTO: NEGATIVE
LYMPHOCYTES # BLD: 3.44 K/UL (ref 0.8–3.5)
LYMPHOCYTES NFR BLD: 47.5 % (ref 12–49)
MCH RBC QN AUTO: 32.2 PG (ref 26–34)
MCHC RBC AUTO-ENTMCNC: 33.7 G/DL (ref 30–36.5)
MCV RBC AUTO: 95.3 FL (ref 80–99)
MONOCYTES # BLD: 0.58 K/UL (ref 0–1)
MONOCYTES NFR BLD: 8 % (ref 5–13)
MUCOUS THREADS URNS QL MICRO: ABNORMAL /LPF
NEUTS SEG # BLD: 3.14 K/UL (ref 1.8–8)
NEUTS SEG NFR BLD: 43.4 % (ref 32–75)
NITRITE UR QL STRIP.AUTO: NEGATIVE
NRBC # BLD: 0 K/UL (ref 0–0.01)
NRBC BLD-RTO: 0 PER 100 WBC
PH UR STRIP: 5.5 (ref 5–8)
PLATELET # BLD AUTO: 252 K/UL (ref 150–400)
PMV BLD AUTO: 10.9 FL (ref 8.9–12.9)
POTASSIUM SERPL-SCNC: 4 MMOL/L (ref 3.5–5.1)
PROT SERPL-MCNC: 6.9 G/DL (ref 6.4–8.2)
PROT UR STRIP-MCNC: NEGATIVE MG/DL
RBC # BLD AUTO: 4.26 M/UL (ref 3.8–5.2)
RBC #/AREA URNS HPF: ABNORMAL /HPF (ref 0–5)
SODIUM SERPL-SCNC: 142 MMOL/L (ref 136–145)
SP GR UR REFRACTOMETRY: 1.01 (ref 1–1.03)
SPECIMEN HOLD: NORMAL
UROBILINOGEN UR QL STRIP.AUTO: 0.2 EU/DL (ref 0.2–1)
WBC # BLD AUTO: 7.2 K/UL (ref 3.6–11)
WBC URNS QL MICRO: ABNORMAL /HPF (ref 0–4)

## 2025-04-05 PROCEDURE — 1100000000 HC RM PRIVATE

## 2025-04-05 PROCEDURE — 36415 COLL VENOUS BLD VENIPUNCTURE: CPT

## 2025-04-05 PROCEDURE — 2580000003 HC RX 258: Performed by: EMERGENCY MEDICINE

## 2025-04-05 PROCEDURE — 96375 TX/PRO/DX INJ NEW DRUG ADDON: CPT

## 2025-04-05 PROCEDURE — 6360000002 HC RX W HCPCS: Performed by: EMERGENCY MEDICINE

## 2025-04-05 PROCEDURE — 81001 URINALYSIS AUTO W/SCOPE: CPT

## 2025-04-05 PROCEDURE — 85025 COMPLETE CBC W/AUTO DIFF WBC: CPT

## 2025-04-05 PROCEDURE — 93005 ELECTROCARDIOGRAM TRACING: CPT | Performed by: INTERNAL MEDICINE

## 2025-04-05 PROCEDURE — 6360000004 HC RX CONTRAST MEDICATION: Performed by: RADIOLOGY

## 2025-04-05 PROCEDURE — 99285 EMERGENCY DEPT VISIT HI MDM: CPT

## 2025-04-05 PROCEDURE — 80053 COMPREHEN METABOLIC PANEL: CPT

## 2025-04-05 PROCEDURE — 83690 ASSAY OF LIPASE: CPT

## 2025-04-05 PROCEDURE — 82077 ASSAY SPEC XCP UR&BREATH IA: CPT

## 2025-04-05 PROCEDURE — 96376 TX/PRO/DX INJ SAME DRUG ADON: CPT

## 2025-04-05 PROCEDURE — 96374 THER/PROPH/DIAG INJ IV PUSH: CPT

## 2025-04-05 PROCEDURE — 74177 CT ABD & PELVIS W/CONTRAST: CPT

## 2025-04-05 RX ORDER — 0.9 % SODIUM CHLORIDE 0.9 %
1000 INTRAVENOUS SOLUTION INTRAVENOUS ONCE
Status: COMPLETED | OUTPATIENT
Start: 2025-04-05 | End: 2025-04-05

## 2025-04-05 RX ORDER — ONDANSETRON 2 MG/ML
4 INJECTION INTRAMUSCULAR; INTRAVENOUS ONCE
Status: COMPLETED | OUTPATIENT
Start: 2025-04-05 | End: 2025-04-05

## 2025-04-05 RX ORDER — SODIUM CHLORIDE 9 MG/ML
INJECTION, SOLUTION INTRAVENOUS PRN
Status: DISCONTINUED | OUTPATIENT
Start: 2025-04-05 | End: 2025-04-12 | Stop reason: HOSPADM

## 2025-04-05 RX ORDER — ONDANSETRON 2 MG/ML
4 INJECTION INTRAMUSCULAR; INTRAVENOUS EVERY 6 HOURS PRN
Status: DISCONTINUED | OUTPATIENT
Start: 2025-04-05 | End: 2025-04-12 | Stop reason: HOSPADM

## 2025-04-05 RX ORDER — SODIUM CHLORIDE 0.9 % (FLUSH) 0.9 %
5-40 SYRINGE (ML) INJECTION PRN
Status: DISCONTINUED | OUTPATIENT
Start: 2025-04-05 | End: 2025-04-12 | Stop reason: HOSPADM

## 2025-04-05 RX ORDER — POTASSIUM CHLORIDE 750 MG/1
40 TABLET, EXTENDED RELEASE ORAL PRN
Status: DISCONTINUED | OUTPATIENT
Start: 2025-04-05 | End: 2025-04-12 | Stop reason: HOSPADM

## 2025-04-05 RX ORDER — ACETAMINOPHEN 650 MG/1
650 SUPPOSITORY RECTAL EVERY 6 HOURS PRN
Status: DISCONTINUED | OUTPATIENT
Start: 2025-04-05 | End: 2025-04-12 | Stop reason: HOSPADM

## 2025-04-05 RX ORDER — HYDROMORPHONE HYDROCHLORIDE 1 MG/ML
1 INJECTION, SOLUTION INTRAMUSCULAR; INTRAVENOUS; SUBCUTANEOUS
Refills: 0 | Status: COMPLETED | OUTPATIENT
Start: 2025-04-05 | End: 2025-04-05

## 2025-04-05 RX ORDER — POTASSIUM CHLORIDE 7.45 MG/ML
10 INJECTION INTRAVENOUS PRN
Status: DISCONTINUED | OUTPATIENT
Start: 2025-04-05 | End: 2025-04-12 | Stop reason: HOSPADM

## 2025-04-05 RX ORDER — ACETAMINOPHEN 325 MG/1
650 TABLET ORAL EVERY 6 HOURS PRN
Status: DISCONTINUED | OUTPATIENT
Start: 2025-04-05 | End: 2025-04-12 | Stop reason: HOSPADM

## 2025-04-05 RX ORDER — IOPAMIDOL 755 MG/ML
100 INJECTION, SOLUTION INTRAVASCULAR
Status: COMPLETED | OUTPATIENT
Start: 2025-04-05 | End: 2025-04-05

## 2025-04-05 RX ORDER — SODIUM CHLORIDE 0.9 % (FLUSH) 0.9 %
5-40 SYRINGE (ML) INJECTION EVERY 12 HOURS SCHEDULED
Status: DISCONTINUED | OUTPATIENT
Start: 2025-04-05 | End: 2025-04-12 | Stop reason: HOSPADM

## 2025-04-05 RX ORDER — ENOXAPARIN SODIUM 100 MG/ML
40 INJECTION SUBCUTANEOUS DAILY
Status: DISCONTINUED | OUTPATIENT
Start: 2025-04-06 | End: 2025-04-12 | Stop reason: HOSPADM

## 2025-04-05 RX ORDER — POLYETHYLENE GLYCOL 3350 17 G/17G
17 POWDER, FOR SOLUTION ORAL DAILY PRN
Status: DISCONTINUED | OUTPATIENT
Start: 2025-04-05 | End: 2025-04-12 | Stop reason: HOSPADM

## 2025-04-05 RX ORDER — ONDANSETRON 4 MG/1
4 TABLET, ORALLY DISINTEGRATING ORAL EVERY 8 HOURS PRN
Status: DISCONTINUED | OUTPATIENT
Start: 2025-04-05 | End: 2025-04-12 | Stop reason: HOSPADM

## 2025-04-05 RX ORDER — MAGNESIUM SULFATE IN WATER 40 MG/ML
2000 INJECTION, SOLUTION INTRAVENOUS PRN
Status: DISCONTINUED | OUTPATIENT
Start: 2025-04-05 | End: 2025-04-12 | Stop reason: HOSPADM

## 2025-04-05 RX ADMIN — ONDANSETRON 4 MG: 2 INJECTION, SOLUTION INTRAMUSCULAR; INTRAVENOUS at 21:03

## 2025-04-05 RX ADMIN — IOPAMIDOL 100 ML: 755 INJECTION, SOLUTION INTRAVENOUS at 21:07

## 2025-04-05 RX ADMIN — SODIUM CHLORIDE 1000 ML: 0.9 INJECTION, SOLUTION INTRAVENOUS at 21:03

## 2025-04-05 RX ADMIN — HYDROMORPHONE HYDROCHLORIDE 1 MG: 1 INJECTION, SOLUTION INTRAMUSCULAR; INTRAVENOUS; SUBCUTANEOUS at 22:09

## 2025-04-05 RX ADMIN — HYDROMORPHONE HYDROCHLORIDE 1 MG: 1 INJECTION, SOLUTION INTRAMUSCULAR; INTRAVENOUS; SUBCUTANEOUS at 21:02

## 2025-04-05 ASSESSMENT — PAIN SCALES - GENERAL
PAINLEVEL_OUTOF10: 10
PAINLEVEL_OUTOF10: 10

## 2025-04-05 ASSESSMENT — PAIN - FUNCTIONAL ASSESSMENT: PAIN_FUNCTIONAL_ASSESSMENT: 0-10

## 2025-04-05 ASSESSMENT — PAIN DESCRIPTION - LOCATION
LOCATION: ABDOMEN
LOCATION: ABDOMEN

## 2025-04-06 ENCOUNTER — ANESTHESIA EVENT (OUTPATIENT)
Facility: HOSPITAL | Age: 50
End: 2025-04-06
Payer: COMMERCIAL

## 2025-04-06 ENCOUNTER — APPOINTMENT (OUTPATIENT)
Facility: HOSPITAL | Age: 50
End: 2025-04-06
Payer: COMMERCIAL

## 2025-04-06 ENCOUNTER — ANESTHESIA (OUTPATIENT)
Facility: HOSPITAL | Age: 50
End: 2025-04-06
Payer: COMMERCIAL

## 2025-04-06 LAB
EKG ATRIAL RATE: 70 BPM
EKG DIAGNOSIS: NORMAL
EKG P AXIS: 64 DEGREES
EKG P-R INTERVAL: 146 MS
EKG Q-T INTERVAL: 420 MS
EKG QRS DURATION: 76 MS
EKG QTC CALCULATION (BAZETT): 453 MS
EKG R AXIS: 56 DEGREES
EKG T AXIS: 71 DEGREES
EKG VENTRICULAR RATE: 70 BPM
ETHANOL SERPL-MCNC: <10 MG/DL (ref 0–0.08)
LACTATE SERPL-SCNC: 1.7 MMOL/L (ref 0.4–2)
LIPASE SERPL-CCNC: 45 U/L (ref 13–75)

## 2025-04-06 PROCEDURE — 2580000003 HC RX 258: Performed by: NURSE ANESTHETIST, CERTIFIED REGISTERED

## 2025-04-06 PROCEDURE — 36415 COLL VENOUS BLD VENIPUNCTURE: CPT

## 2025-04-06 PROCEDURE — 74018 RADEX ABDOMEN 1 VIEW: CPT

## 2025-04-06 PROCEDURE — 7100000000 HC PACU RECOVERY - FIRST 15 MIN: Performed by: SURGERY

## 2025-04-06 PROCEDURE — 6360000002 HC RX W HCPCS: Performed by: NURSE ANESTHETIST, CERTIFIED REGISTERED

## 2025-04-06 PROCEDURE — 88307 TISSUE EXAM BY PATHOLOGIST: CPT

## 2025-04-06 PROCEDURE — 2500000003 HC RX 250 WO HCPCS: Performed by: NURSE ANESTHETIST, CERTIFIED REGISTERED

## 2025-04-06 PROCEDURE — 3600000014 HC SURGERY LEVEL 4 ADDTL 15MIN: Performed by: SURGERY

## 2025-04-06 PROCEDURE — 2500000003 HC RX 250 WO HCPCS: Performed by: INTERNAL MEDICINE

## 2025-04-06 PROCEDURE — 6360000002 HC RX W HCPCS: Performed by: SURGERY

## 2025-04-06 PROCEDURE — 6360000002 HC RX W HCPCS: Performed by: STUDENT IN AN ORGANIZED HEALTH CARE EDUCATION/TRAINING PROGRAM

## 2025-04-06 PROCEDURE — P9045 ALBUMIN (HUMAN), 5%, 250 ML: HCPCS | Performed by: NURSE ANESTHETIST, CERTIFIED REGISTERED

## 2025-04-06 PROCEDURE — 1100000000 HC RM PRIVATE

## 2025-04-06 PROCEDURE — 6360000004 HC RX CONTRAST MEDICATION: Performed by: STUDENT IN AN ORGANIZED HEALTH CARE EDUCATION/TRAINING PROGRAM

## 2025-04-06 PROCEDURE — 2580000003 HC RX 258: Performed by: SURGERY

## 2025-04-06 PROCEDURE — 3700000001 HC ADD 15 MINUTES (ANESTHESIA): Performed by: SURGERY

## 2025-04-06 PROCEDURE — 3700000000 HC ANESTHESIA ATTENDED CARE: Performed by: SURGERY

## 2025-04-06 PROCEDURE — 3600000004 HC SURGERY LEVEL 4 BASE: Performed by: SURGERY

## 2025-04-06 PROCEDURE — 74174 CTA ABD&PLVS W/CONTRAST: CPT

## 2025-04-06 PROCEDURE — 93010 ELECTROCARDIOGRAM REPORT: CPT | Performed by: INTERNAL MEDICINE

## 2025-04-06 PROCEDURE — 0DBN0ZZ EXCISION OF SIGMOID COLON, OPEN APPROACH: ICD-10-PCS | Performed by: SURGERY

## 2025-04-06 PROCEDURE — 6370000000 HC RX 637 (ALT 250 FOR IP): Performed by: INTERNAL MEDICINE

## 2025-04-06 PROCEDURE — 7100000001 HC PACU RECOVERY - ADDTL 15 MIN: Performed by: SURGERY

## 2025-04-06 PROCEDURE — 6360000002 HC RX W HCPCS: Performed by: INTERNAL MEDICINE

## 2025-04-06 PROCEDURE — 83605 ASSAY OF LACTIC ACID: CPT

## 2025-04-06 PROCEDURE — 94761 N-INVAS EAR/PLS OXIMETRY MLT: CPT

## 2025-04-06 PROCEDURE — 2720000010 HC SURG SUPPLY STERILE: Performed by: SURGERY

## 2025-04-06 PROCEDURE — 2709999900 HC NON-CHARGEABLE SUPPLY: Performed by: SURGERY

## 2025-04-06 PROCEDURE — 6370000000 HC RX 637 (ALT 250 FOR IP): Performed by: STUDENT IN AN ORGANIZED HEALTH CARE EDUCATION/TRAINING PROGRAM

## 2025-04-06 RX ORDER — ONDANSETRON 2 MG/ML
INJECTION INTRAMUSCULAR; INTRAVENOUS
Status: DISCONTINUED | OUTPATIENT
Start: 2025-04-06 | End: 2025-04-06 | Stop reason: SDUPTHER

## 2025-04-06 RX ORDER — ROCURONIUM BROMIDE 10 MG/ML
INJECTION, SOLUTION INTRAVENOUS
Status: DISCONTINUED | OUTPATIENT
Start: 2025-04-06 | End: 2025-04-06 | Stop reason: SDUPTHER

## 2025-04-06 RX ORDER — SODIUM CHLORIDE, SODIUM LACTATE, POTASSIUM CHLORIDE, CALCIUM CHLORIDE 600; 310; 30; 20 MG/100ML; MG/100ML; MG/100ML; MG/100ML
INJECTION, SOLUTION INTRAVENOUS CONTINUOUS
Status: DISCONTINUED | OUTPATIENT
Start: 2025-04-06 | End: 2025-04-09

## 2025-04-06 RX ORDER — IOPAMIDOL 755 MG/ML
100 INJECTION, SOLUTION INTRAVASCULAR
Status: COMPLETED | OUTPATIENT
Start: 2025-04-06 | End: 2025-04-06

## 2025-04-06 RX ORDER — DROPERIDOL 2.5 MG/ML
0.62 INJECTION, SOLUTION INTRAMUSCULAR; INTRAVENOUS ONCE
Status: COMPLETED | OUTPATIENT
Start: 2025-04-06 | End: 2025-04-06

## 2025-04-06 RX ORDER — OXYCODONE HYDROCHLORIDE 5 MG/1
5 TABLET ORAL EVERY 4 HOURS PRN
Refills: 0 | Status: DISCONTINUED | OUTPATIENT
Start: 2025-04-06 | End: 2025-04-06

## 2025-04-06 RX ORDER — NALOXONE HYDROCHLORIDE 0.4 MG/ML
0.4 INJECTION, SOLUTION INTRAMUSCULAR; INTRAVENOUS; SUBCUTANEOUS PRN
Status: DISCONTINUED | OUTPATIENT
Start: 2025-04-06 | End: 2025-04-12 | Stop reason: HOSPADM

## 2025-04-06 RX ORDER — OXYCODONE HYDROCHLORIDE 5 MG/1
10 TABLET ORAL EVERY 4 HOURS PRN
Refills: 0 | Status: DISCONTINUED | OUTPATIENT
Start: 2025-04-06 | End: 2025-04-06

## 2025-04-06 RX ORDER — FENTANYL 12.5 UG/1
1 PATCH TRANSDERMAL
Status: DISCONTINUED | OUTPATIENT
Start: 2025-04-06 | End: 2025-04-06

## 2025-04-06 RX ORDER — DEXMEDETOMIDINE HYDROCHLORIDE 100 UG/ML
INJECTION, SOLUTION INTRAVENOUS
Status: DISCONTINUED | OUTPATIENT
Start: 2025-04-06 | End: 2025-04-06 | Stop reason: SDUPTHER

## 2025-04-06 RX ORDER — MORPHINE SULFATE 2 MG/ML
2 INJECTION, SOLUTION INTRAMUSCULAR; INTRAVENOUS EVERY 4 HOURS PRN
Status: DISCONTINUED | OUTPATIENT
Start: 2025-04-06 | End: 2025-04-12 | Stop reason: HOSPADM

## 2025-04-06 RX ORDER — DIAZEPAM 10 MG/2ML
10 INJECTION, SOLUTION INTRAMUSCULAR; INTRAVENOUS EVERY 4 HOURS PRN
Status: DISCONTINUED | OUTPATIENT
Start: 2025-04-06 | End: 2025-04-12 | Stop reason: HOSPADM

## 2025-04-06 RX ORDER — DEXAMETHASONE SODIUM PHOSPHATE 4 MG/ML
INJECTION, SOLUTION INTRA-ARTICULAR; INTRALESIONAL; INTRAMUSCULAR; INTRAVENOUS; SOFT TISSUE
Status: DISCONTINUED | OUTPATIENT
Start: 2025-04-06 | End: 2025-04-06 | Stop reason: SDUPTHER

## 2025-04-06 RX ORDER — BUPIVACAINE HYDROCHLORIDE 5 MG/ML
INJECTION, SOLUTION EPIDURAL; INTRACAUDAL PRN
Status: DISCONTINUED | OUTPATIENT
Start: 2025-04-06 | End: 2025-04-06 | Stop reason: ALTCHOICE

## 2025-04-06 RX ORDER — SODIUM CHLORIDE, SODIUM LACTATE, POTASSIUM CHLORIDE, CALCIUM CHLORIDE 600; 310; 30; 20 MG/100ML; MG/100ML; MG/100ML; MG/100ML
INJECTION, SOLUTION INTRAVENOUS
Status: DISCONTINUED | OUTPATIENT
Start: 2025-04-06 | End: 2025-04-06 | Stop reason: SDUPTHER

## 2025-04-06 RX ORDER — NALOXONE HYDROCHLORIDE 0.4 MG/ML
INJECTION, SOLUTION INTRAMUSCULAR; INTRAVENOUS; SUBCUTANEOUS PRN
Status: DISCONTINUED | OUTPATIENT
Start: 2025-04-06 | End: 2025-04-06

## 2025-04-06 RX ORDER — NALTREXONE HYDROCHLORIDE 50 MG/1
50 TABLET, FILM COATED ORAL DAILY
Status: ON HOLD | COMMUNITY
End: 2025-04-08 | Stop reason: HOSPADM

## 2025-04-06 RX ORDER — SODIUM CHLORIDE, SODIUM LACTATE, POTASSIUM CHLORIDE, CALCIUM CHLORIDE 600; 310; 30; 20 MG/100ML; MG/100ML; MG/100ML; MG/100ML
INJECTION, SOLUTION INTRAVENOUS CONTINUOUS
Status: DISCONTINUED | OUTPATIENT
Start: 2025-04-06 | End: 2025-04-06 | Stop reason: HOSPADM

## 2025-04-06 RX ORDER — DIPHENHYDRAMINE HYDROCHLORIDE 50 MG/ML
12.5 INJECTION, SOLUTION INTRAMUSCULAR; INTRAVENOUS
Status: DISCONTINUED | OUTPATIENT
Start: 2025-04-06 | End: 2025-04-06

## 2025-04-06 RX ORDER — DICYCLOMINE HCL 20 MG
20 TABLET ORAL 3 TIMES DAILY PRN
Status: DISCONTINUED | OUTPATIENT
Start: 2025-04-06 | End: 2025-04-12 | Stop reason: HOSPADM

## 2025-04-06 RX ORDER — PHENYLEPHRINE HCL IN 0.9% NACL 0.4MG/10ML
SYRINGE (ML) INTRAVENOUS
Status: DISCONTINUED | OUTPATIENT
Start: 2025-04-06 | End: 2025-04-06 | Stop reason: SDUPTHER

## 2025-04-06 RX ORDER — FAMOTIDINE 10 MG/ML
INJECTION, SOLUTION INTRAVENOUS
Status: DISCONTINUED | OUTPATIENT
Start: 2025-04-06 | End: 2025-04-06 | Stop reason: SDUPTHER

## 2025-04-06 RX ORDER — ONDANSETRON 2 MG/ML
4 INJECTION INTRAMUSCULAR; INTRAVENOUS
Status: DISCONTINUED | OUTPATIENT
Start: 2025-04-06 | End: 2025-04-06 | Stop reason: HOSPADM

## 2025-04-06 RX ORDER — LIDOCAINE HYDROCHLORIDE 20 MG/ML
INJECTION, SOLUTION EPIDURAL; INFILTRATION; INTRACAUDAL; PERINEURAL
Status: DISCONTINUED | OUTPATIENT
Start: 2025-04-06 | End: 2025-04-06 | Stop reason: SDUPTHER

## 2025-04-06 RX ORDER — SODIUM CHLORIDE, SODIUM LACTATE, POTASSIUM CHLORIDE, AND CALCIUM CHLORIDE .6; .31; .03; .02 G/100ML; G/100ML; G/100ML; G/100ML
INJECTION, SOLUTION INTRAVENOUS
Status: DISCONTINUED | OUTPATIENT
Start: 2025-04-06 | End: 2025-04-06 | Stop reason: SDUPTHER

## 2025-04-06 RX ORDER — ALBUMIN HUMAN 50 G/1000ML
SOLUTION INTRAVENOUS
Status: DISCONTINUED | OUTPATIENT
Start: 2025-04-06 | End: 2025-04-06 | Stop reason: SDUPTHER

## 2025-04-06 RX ORDER — HYDROMORPHONE HYDROCHLORIDE 2 MG/ML
INJECTION, SOLUTION INTRAMUSCULAR; INTRAVENOUS; SUBCUTANEOUS
Status: DISCONTINUED | OUTPATIENT
Start: 2025-04-06 | End: 2025-04-06

## 2025-04-06 RX ORDER — PROPOFOL 10 MG/ML
INJECTION, EMULSION INTRAVENOUS
Status: DISCONTINUED | OUTPATIENT
Start: 2025-04-06 | End: 2025-04-06 | Stop reason: SDUPTHER

## 2025-04-06 RX ORDER — TOPIRAMATE 100 MG/1
100 TABLET, FILM COATED ORAL DAILY
COMMUNITY

## 2025-04-06 RX ORDER — MIDAZOLAM HYDROCHLORIDE 1 MG/ML
INJECTION, SOLUTION INTRAMUSCULAR; INTRAVENOUS
Status: DISCONTINUED | OUTPATIENT
Start: 2025-04-06 | End: 2025-04-06 | Stop reason: SDUPTHER

## 2025-04-06 RX ORDER — HYDROMORPHONE HYDROCHLORIDE 1 MG/ML
1 INJECTION, SOLUTION INTRAMUSCULAR; INTRAVENOUS; SUBCUTANEOUS
Status: DISCONTINUED | OUTPATIENT
Start: 2025-04-06 | End: 2025-04-06

## 2025-04-06 RX ORDER — SUCCINYLCHOLINE/SOD CL,ISO/PF 100 MG/5ML
SYRINGE (ML) INTRAVENOUS
Status: DISCONTINUED | OUTPATIENT
Start: 2025-04-06 | End: 2025-04-06 | Stop reason: SDUPTHER

## 2025-04-06 RX ORDER — NALOXONE HYDROCHLORIDE 0.4 MG/ML
INJECTION, SOLUTION INTRAMUSCULAR; INTRAVENOUS; SUBCUTANEOUS PRN
Status: DISCONTINUED | OUTPATIENT
Start: 2025-04-06 | End: 2025-04-06 | Stop reason: HOSPADM

## 2025-04-06 RX ORDER — DIPHENHYDRAMINE HYDROCHLORIDE 50 MG/ML
12.5 INJECTION, SOLUTION INTRAMUSCULAR; INTRAVENOUS
Status: DISCONTINUED | OUTPATIENT
Start: 2025-04-06 | End: 2025-04-06 | Stop reason: HOSPADM

## 2025-04-06 RX ORDER — TOPIRAMATE 100 MG/1
100 TABLET, FILM COATED ORAL DAILY
Status: DISCONTINUED | OUTPATIENT
Start: 2025-04-06 | End: 2025-04-12 | Stop reason: HOSPADM

## 2025-04-06 RX ORDER — ONDANSETRON 2 MG/ML
4 INJECTION INTRAMUSCULAR; INTRAVENOUS
Status: DISCONTINUED | OUTPATIENT
Start: 2025-04-06 | End: 2025-04-06

## 2025-04-06 RX ADMIN — OXYCODONE HYDROCHLORIDE 10 MG: 5 TABLET ORAL at 04:36

## 2025-04-06 RX ADMIN — Medication 40 MCG: at 10:40

## 2025-04-06 RX ADMIN — HYDROMORPHONE HYDROCHLORIDE 0.5 MG: 1 INJECTION, SOLUTION INTRAMUSCULAR; INTRAVENOUS; SUBCUTANEOUS at 13:18

## 2025-04-06 RX ADMIN — ROCURONIUM BROMIDE 10 MG: 10 INJECTION, SOLUTION INTRAVENOUS at 11:02

## 2025-04-06 RX ADMIN — Medication 80 MCG: at 10:32

## 2025-04-06 RX ADMIN — SODIUM CHLORIDE, SODIUM LACTATE, POTASSIUM CHLORIDE, AND CALCIUM CHLORIDE 250 ML: 600; 310; 30; 20 INJECTION, SOLUTION INTRAVENOUS at 10:47

## 2025-04-06 RX ADMIN — HYDROMORPHONE HYDROCHLORIDE 0.5 MG: 1 INJECTION, SOLUTION INTRAMUSCULAR; INTRAVENOUS; SUBCUTANEOUS at 16:48

## 2025-04-06 RX ADMIN — HYDROMORPHONE HYDROCHLORIDE 0.5 MG: 1 INJECTION, SOLUTION INTRAMUSCULAR; INTRAVENOUS; SUBCUTANEOUS at 11:56

## 2025-04-06 RX ADMIN — ROCURONIUM BROMIDE 40 MG: 10 INJECTION, SOLUTION INTRAVENOUS at 10:38

## 2025-04-06 RX ADMIN — FLUOXETINE HYDROCHLORIDE 20 MG: 20 CAPSULE ORAL at 08:28

## 2025-04-06 RX ADMIN — OXYCODONE HYDROCHLORIDE 10 MG: 5 TABLET ORAL at 08:28

## 2025-04-06 RX ADMIN — ONDANSETRON 4 MG: 2 INJECTION, SOLUTION INTRAMUSCULAR; INTRAVENOUS at 10:32

## 2025-04-06 RX ADMIN — HYDROMORPHONE HYDROCHLORIDE 0.5 MG: 1 INJECTION, SOLUTION INTRAMUSCULAR; INTRAVENOUS; SUBCUTANEOUS at 12:38

## 2025-04-06 RX ADMIN — DEXAMETHASONE SODIUM PHOSPHATE 4 MG: 4 INJECTION INTRA-ARTICULAR; INTRALESIONAL; INTRAMUSCULAR; INTRAVENOUS; SOFT TISSUE at 10:40

## 2025-04-06 RX ADMIN — Medication 40 MCG: at 10:37

## 2025-04-06 RX ADMIN — PROPOFOL 75 MCG/KG/MIN: 10 INJECTION, EMULSION INTRAVENOUS at 10:32

## 2025-04-06 RX ADMIN — LIDOCAINE HYDROCHLORIDE 80 MG: 20 INJECTION, SOLUTION EPIDURAL; INFILTRATION; INTRACAUDAL; PERINEURAL at 10:25

## 2025-04-06 RX ADMIN — SODIUM CHLORIDE, PRESERVATIVE FREE 10 ML: 5 INJECTION INTRAVENOUS at 01:58

## 2025-04-06 RX ADMIN — HYDROMORPHONE HYDROCHLORIDE 0.5 MG: 1 INJECTION, SOLUTION INTRAMUSCULAR; INTRAVENOUS; SUBCUTANEOUS at 12:10

## 2025-04-06 RX ADMIN — HYDROMORPHONE HYDROCHLORIDE 0.4 MG: 2 INJECTION, SOLUTION INTRAMUSCULAR; INTRAVENOUS; SUBCUTANEOUS at 10:19

## 2025-04-06 RX ADMIN — HYDROMORPHONE HYDROCHLORIDE 1 MG: 2 INJECTION, SOLUTION INTRAMUSCULAR; INTRAVENOUS; SUBCUTANEOUS at 11:19

## 2025-04-06 RX ADMIN — SODIUM CHLORIDE, SODIUM LACTATE, POTASSIUM CHLORIDE, AND CALCIUM CHLORIDE: .6; .31; .03; .02 INJECTION, SOLUTION INTRAVENOUS at 13:02

## 2025-04-06 RX ADMIN — DROPERIDOL 0.62 MG: 2.5 INJECTION, SOLUTION INTRAMUSCULAR; INTRAVENOUS at 12:03

## 2025-04-06 RX ADMIN — SUGAMMADEX 200 MG: 100 INJECTION, SOLUTION INTRAVENOUS at 11:17

## 2025-04-06 RX ADMIN — Medication 25 MG: at 10:22

## 2025-04-06 RX ADMIN — OXYCODONE HYDROCHLORIDE 10 MG: 5 TABLET ORAL at 01:01

## 2025-04-06 RX ADMIN — Medication 100 MG: at 10:28

## 2025-04-06 RX ADMIN — SODIUM CHLORIDE, POTASSIUM CHLORIDE, SODIUM LACTATE AND CALCIUM CHLORIDE: 600; 310; 30; 20 INJECTION, SOLUTION INTRAVENOUS at 10:42

## 2025-04-06 RX ADMIN — HYDROMORPHONE HYDROCHLORIDE 0.5 MG: 1 INJECTION, SOLUTION INTRAMUSCULAR; INTRAVENOUS; SUBCUTANEOUS at 12:03

## 2025-04-06 RX ADMIN — SODIUM CHLORIDE, POTASSIUM CHLORIDE, SODIUM LACTATE AND CALCIUM CHLORIDE: 600; 310; 30; 20 INJECTION, SOLUTION INTRAVENOUS at 10:54

## 2025-04-06 RX ADMIN — HYDROMORPHONE HYDROCHLORIDE 0.5 MG: 1 INJECTION, SOLUTION INTRAMUSCULAR; INTRAVENOUS; SUBCUTANEOUS at 23:34

## 2025-04-06 RX ADMIN — Medication 25 MG: at 10:27

## 2025-04-06 RX ADMIN — IOPAMIDOL 100 ML: 755 INJECTION, SOLUTION INTRAVENOUS at 08:50

## 2025-04-06 RX ADMIN — PROPOFOL 150 MG: 10 INJECTION, EMULSION INTRAVENOUS at 10:27

## 2025-04-06 RX ADMIN — SODIUM CHLORIDE, SODIUM LACTATE, POTASSIUM CHLORIDE, AND CALCIUM CHLORIDE 250 ML: 600; 310; 30; 20 INJECTION, SOLUTION INTRAVENOUS at 10:20

## 2025-04-06 RX ADMIN — DEXMEDETOMIDINE 8 MCG: 100 INJECTION, SOLUTION INTRAVENOUS at 10:26

## 2025-04-06 RX ADMIN — SODIUM CHLORIDE, PRESERVATIVE FREE 10 ML: 5 INJECTION INTRAVENOUS at 20:13

## 2025-04-06 RX ADMIN — HYDROMORPHONE HYDROCHLORIDE 0.5 MG: 1 INJECTION, SOLUTION INTRAMUSCULAR; INTRAVENOUS; SUBCUTANEOUS at 20:12

## 2025-04-06 RX ADMIN — HYDROMORPHONE HYDROCHLORIDE 0.5 MG: 1 INJECTION, SOLUTION INTRAMUSCULAR; INTRAVENOUS; SUBCUTANEOUS at 09:50

## 2025-04-06 RX ADMIN — FAMOTIDINE 20 MG: 10 INJECTION, SOLUTION INTRAVENOUS at 10:38

## 2025-04-06 RX ADMIN — ALBUMIN (HUMAN) 12.5 G: 12.5 SOLUTION INTRAVENOUS at 10:55

## 2025-04-06 RX ADMIN — HYDROMORPHONE HYDROCHLORIDE 0.6 MG: 2 INJECTION, SOLUTION INTRAMUSCULAR; INTRAVENOUS; SUBCUTANEOUS at 10:15

## 2025-04-06 RX ADMIN — MIDAZOLAM HYDROCHLORIDE 2 MG: 1 INJECTION, SOLUTION INTRAMUSCULAR; INTRAVENOUS at 10:19

## 2025-04-06 RX ADMIN — DIAZEPAM 10 MG: 5 INJECTION, SOLUTION INTRAMUSCULAR; INTRAVENOUS at 01:57

## 2025-04-06 RX ADMIN — PIPERACILLIN AND TAZOBACTAM 3375 MG: 3; .375 INJECTION, POWDER, FOR SOLUTION INTRAVENOUS at 10:37

## 2025-04-06 RX ADMIN — PIPERACILLIN AND TAZOBACTAM 3375 MG: 3; .375 INJECTION, POWDER, LYOPHILIZED, FOR SOLUTION INTRAVENOUS at 18:18

## 2025-04-06 RX ADMIN — HYDROMORPHONE HYDROCHLORIDE 0.5 MG: 1 INJECTION, SOLUTION INTRAMUSCULAR; INTRAVENOUS; SUBCUTANEOUS at 12:22

## 2025-04-06 ASSESSMENT — PAIN DESCRIPTION - LOCATION
LOCATION: ABDOMEN
LOCATION: ABDOMEN;MEDIASTINUM
LOCATION: ABDOMEN

## 2025-04-06 ASSESSMENT — PAIN SCALES - GENERAL
PAINLEVEL_OUTOF10: 8
PAINLEVEL_OUTOF10: 5
PAINLEVEL_OUTOF10: 10
PAINLEVEL_OUTOF10: 6
PAINLEVEL_OUTOF10: 8
PAINLEVEL_OUTOF10: 7
PAINLEVEL_OUTOF10: 3
PAINLEVEL_OUTOF10: 8
PAINLEVEL_OUTOF10: 7
PAINLEVEL_OUTOF10: 8
PAINLEVEL_OUTOF10: 10
PAINLEVEL_OUTOF10: 5
PAINLEVEL_OUTOF10: 6

## 2025-04-06 ASSESSMENT — PAIN - FUNCTIONAL ASSESSMENT
PAIN_FUNCTIONAL_ASSESSMENT: 0-10
PAIN_FUNCTIONAL_ASSESSMENT: PREVENTS OR INTERFERES WITH ALL ACTIVE AND SOME PASSIVE ACTIVITIES
PAIN_FUNCTIONAL_ASSESSMENT: 0-10

## 2025-04-06 ASSESSMENT — PAIN DESCRIPTION - DESCRIPTORS
DESCRIPTORS: ACHING;SORE;SHARP
DESCRIPTORS: SHARP
DESCRIPTORS: ACHING
DESCRIPTORS: CRAMPING;SHOOTING
DESCRIPTORS: ACHING;DISCOMFORT;SORE

## 2025-04-06 NOTE — H&P
Patient: Deepti Ayala   49 y.o. female   : 1975   MRN: [id number] Attending: Samy Moulton DO  Full Code  PRIMARY CARE MD: Ally Sears MD      ASSESSMENT & PLAN:  Abdominal pain.  Unclear etiology.  CT of the abdomen/pelvis is unremarkable except for small amount of ascites.  This does not appear to be a surgical issue.  Request gastroenterology consultation.  Chronic diarrhea.  Bowel habits are unchanged.  S/p sigmoid colectomy in .  Anxiety.  Valium 10 mg IV every 4 hours as needed.    Chief Complaint:   Chief Complaint   Patient presents with    Abdominal Pain       History Gathered From: patient and past medical records    History of Present Illness    Patient is a 49-year-old female who had sigmoid colectomy for perforated diverticulitis in . Patient states that she did not have issues with abdominal pain since then until the night of 2025, at about 7 PM. She states that the pain came about abruptly, and it was a stabbing pain that was all over her abdomen. The intensity of the pain would not be the most severe in any particular place, migrating at times to the left lower quadrant as well as the left upper quadrant. The pain can exacerbate to a sharp, stabbing quality, and then become more of a dull soreness. Patient states that she was nauseous but did not have any vomiting. Patient states that since the surgery in , she has been having diarrhea. She states that the diarrhea is not every day, but on more days, about 2 to 3 times a day on average, but can go up to 7 to 8 times a day. She has not followed up with a gastroenterologist regarding the diarrhea, but did speak to her surgeon, Dr. Destiny Templeton, who felt that it may have been as a result of Postoperative colon  changes.  She states that she never had diarrhea prior to her surgery.    Patient denies any blood in the bowel movements. Her last bowel movement was  not dilated.  SPLEEN: within normal limits.  PANCREAS: No mass or ductal dilatation.  ADRENALS: Unremarkable.  KIDNEYS: No mass, calculus, or hydronephrosis.  STOMACH: Unremarkable.  SMALL BOWEL: No dilatation or wall thickening.  COLON: Post resection of the distal descending colon..  APPENDIX: Not seen  PERITONEUM: Small volume of free fluid  RETROPERITONEUM: No lymphadenopathy or aortic aneurysm.  REPRODUCTIVE ORGANS: Not enlarged. Probable fibroid within the posterior right  lateral wall  URINARY BLADDER: No mass or calculus.  BONES: No destructive bone lesion.  ABDOMINAL WALL: No mass or hernia.  ADDITIONAL COMMENTS: N/A  Impression: Small volume of ascites. Cause is not identified.    Electronically signed by Vini Santillan        The purpose of this note is to communicate optimally with other physicians, advanced care practitioners and appropriate medical staff involved in the care of this patient and facilitate management.  It is written using standard medical terminology.    SIGNATURE:    Samy Moulton DO

## 2025-04-06 NOTE — ED NOTES
TRANSFER - OUT REPORT:    Verbal report given to Edita WISEMAN on Deepti Ayala  being transferred to [facility] for routine progression of patient care       Report consisted of patient's Situation, Background, Assessment and   Recommendations(SBAR).     Information from the following report(s) Nurse Handoff Report, ED Encounter Summary, ED SBAR, Adult Overview, MAR, Recent Results, and Neuro Assessment was reviewed with the receiving nurse.    [place] Fall Assessment:    Presents to emergency department  because of falls (Syncope, seizure, or loss of consciousness): No  Age > 70: No  Altered Mental Status, Intoxication with alcohol or substance confusion (Disorientation, impaired judgment, poor safety awaremess, or inability to follow instructions): No  Impaired Mobility: Ambulates or transfers with assistive devices or assistance; Unable to ambulate or transer.: Yes  Nursing Judgement: Yes          Lines:   Peripheral IV 04/05/25 Right Antecubital (Active)        Opportunity for questions and clarification was provided.      Patient transported with:  Monitor and Registered Nurse

## 2025-04-06 NOTE — PLAN OF CARE
Problem: Discharge Planning  Goal: Discharge to home or other facility with appropriate resources  4/6/2025 1610 by Jerel Desai, RN  Outcome: Progressing  4/6/2025 0245 by Margarito Esteban RN  Outcome: Progressing     Problem: Pain  Goal: Verbalizes/displays adequate comfort level or baseline comfort level  4/6/2025 1610 by Jerel Desai, RN  Outcome: Progressing  4/6/2025 0245 by Margarito Esteban RN  Outcome: Progressing     Problem: Safety - Adult  Goal: Free from fall injury  4/6/2025 1610 by Jerel Desai, RN  Outcome: Progressing  4/6/2025 0245 by Margarito Esteban RN  Outcome: Progressing

## 2025-04-06 NOTE — BRIEF OP NOTE
Brief Postoperative Note      Patient: Deepti Ayala  YOB: 1975  MRN: [id number]    Date of Procedure: 4/6/2025    Pre-Op Diagnosis Codes:      * Bowel perforation (HCC) [K63.1]    Post-Op Diagnosis: Same       Procedure(s):  LAPAROTOMY EXPLORATORY    Surgeon(s):  Josse Ni MD    Assistant:  Surgical Assistant: Jerel Kahn    Anesthesia: General    Estimated Blood Loss (mL): Minimal    Complications: None    Specimens:   * No specimens in log *    Implants:  * No implants in log *      Drains:   Closed/Suction Drain Left LLQ;Abdomen Bulb (Active)       Urinary Catheter 04/06/25 2 Way (Active)       Findings:  Infection Present At Time Of Surgery (PATOS) (choose all levels that have infection present):  - Organ Space infection (below fascia) present as evidenced by feculent peritonitis  Other Findings:  Small Colonic Perforation at Prior Anastamosis    Electronically signed by Josse Ni MD on 4/6/2025 at 11:17 AM

## 2025-04-06 NOTE — ED PROVIDER NOTES
[facility] EMERGENCY DEPARTMENT  EMERGENCY DEPARTMENT ENCOUNTER      Pt Name: Deepti Ayala  MRN: [id number]  Birthdate 1975  Date of evaluation: 4/5/2025  Provider: Hardik Parekh MD    CHIEF COMPLAINT       Chief Complaint   Patient presents with    Abdominal Pain    Vomiting         HISTORY OF PRESENT ILLNESS   (Location/Symptom, Timing/Onset, Context/Setting, Quality, Duration, Modifying Factors, Severity)  Note limiting factors.   49-year-old female with severe abdominal pain for 1 hour.  The pain is very severe and centrally located in the abdomen.  Patient is crying out in the ER due to the pain.  No vomiting and no diarrhea.  Patient has a history of diverticulitis with perforation requiring emergency surgery on September 12, 2024.  She feels her condition today is similar to that.    The history is provided by the patient.   Abdominal Pain  Pain location:  Generalized  Pain quality: gnawing, sharp and stabbing    Pain radiates to:  Does not radiate  Pain severity:  Severe  Onset quality:  Sudden  Duration:  1 hour  Timing:  Constant  Progression:  Worsening  Chronicity:  New  Context: not alcohol use, not awakening from sleep, not diet changes, not eating, not medication withdrawal, not previous surgeries, not recent illness, not recent sexual activity, not retching, not sick contacts and not suspicious food intake    Relieved by:  Nothing  Worsened by:  Nothing  Ineffective treatments:  None tried  Associated symptoms: no anorexia, no belching, no chest pain, no chills, no cough, no diarrhea, no dysuria, no fatigue, no fever, no hematemesis, no hematochezia, no hematuria, no melena, no nausea, no shortness of breath, no sore throat, no vaginal bleeding, no vaginal discharge and no vomiting          Review of External Medical Records:     Nursing Notes were reviewed.    REVIEW OF SYSTEMS    (2-9 systems for level 4, 10 or more for level 5)     Review of Systems  109 (*)     Glucose 108 (*)     BUN 21 (*)     BUN/Creatinine Ratio 21 (*)     AST 12 (*)     All other components within normal limits   URINALYSIS WITH MICROSCOPIC - Abnormal; Notable for the following components:    Epithelial Cells, UA MANY (*)     BACTERIA, URINE 1+ (*)     Mucus, UA 2+ (*)     Calcium Oxalate 1+ (*)     All other components within normal limits   CBC WITH AUTO DIFFERENTIAL   EXTRA TUBES HOLD   LIPASE       All other labs were within normal range or not returned as of this dictation.    EMERGENCY DEPARTMENT COURSE and DIFFERENTIAL DIAGNOSIS/MDM:   Vitals:    Vitals:    04/05/25 2026 04/05/25 2028 04/05/25 2153   BP: (!) 133/102  109/76   Pulse: 93     Resp: 18     Temp: 97.3 °F (36.3 °C)     TempSrc: Oral     SpO2:  95% 100%   Weight: 65.4 kg (144 lb 2.9 oz)     Height: 1.6 m (5' 3\")             Medical Decision Making  49-year-old female with severe abdominal pain for an hour.  Bowel resection was done in September 2024 due to perforated diverticulitis.  Will do labs and CT abdomen pelvis stat.  Dr. Templeton did her previous surgery at [facility].    Amount and/or Complexity of Data Reviewed  Labs: ordered.  Radiology: ordered.    Risk  Prescription drug management.  Decision regarding hospitalization.            REASSESSMENT     ED Course as of 04/05/25 2213   Sat Apr 05, 2025   2210 49-year-old female with severe abdominal pain intractable in nature.  Dilaudid helps temporarily.  No vomiting or diarrhea.  Patient has a history of perforated diverticulitis with surgery done in September of 2024.  White blood cell count today is normal.  CT shows some ascites but no free air or obstruction. [VM]      ED Course User Index  [VM] Hardik Parekh MD           CONSULTS:  None    PROCEDURES:  Unless otherwise noted below, none     Procedures    Perfect Serve Consult for Admission  10:13 PM    ED Room Number: ER20/20  Patient Name and age:  Deepti Ayala 49 y.o.  female  Working

## 2025-04-06 NOTE — PERIOP NOTE
TRANSFER - OUT REPORT:    Verbal report given to kevin resendez on Deepti Ayala  being transferred to [facility] for routine post-op       Report consisted of patient's Situation, Background, Assessment and   Recommendations(SBAR).     Information from the following report(s) Nurse Handoff Report, Surgery Report, and Intake/Output was reviewed with the receiving nurse.           Lines:   Peripheral IV 04/05/25 Right Antecubital (Active)   Site Assessment Clean, dry & intact 04/06/25 1149   Line Status Infusing 04/06/25 1149   Line Care Connections checked and tightened 04/06/25 1015   Phlebitis Assessment No symptoms 04/06/25 1149   Infiltration Assessment 0 04/06/25 1149   Alcohol Cap Used Yes 04/06/25 1149   Dressing Status Clean, dry & intact 04/06/25 1149   Dressing Type Transparent 04/06/25 1149       Peripheral IV 04/06/25 Distal;Left Forearm (Active)   Site Assessment Clean, dry & intact 04/06/25 1151   Line Status Infusing 04/06/25 1151   Phlebitis Assessment No symptoms 04/06/25 1151   Infiltration Assessment 0 04/06/25 1151   Alcohol Cap Used Yes 04/06/25 1151   Dressing Status Clean, dry & intact;New dressing applied 04/06/25 1151   Dressing Type Transparent 04/06/25 1151   Dressing Intervention New 04/06/25 1151        Opportunity for questions and clarification was provided.      Patient transported with:  Tech

## 2025-04-06 NOTE — ED NOTES
RN called into room for unrelieved pain. Patient was given an additional dose of 1mg dilaudid at 2210. RN informed patient and family that any additional narcotic would not be safe at this time. Family and patient verbalized understanding.

## 2025-04-06 NOTE — PROGRESS NOTES
Hospitalist Progress Note      NAME:  Deepti Ayala   :  1975  MRM:  [id number]    Date/Time: 2025  9:06 AM           Assessment / Plan:     49-year-old female who had sigmoid colectomy for perforated diverticulitis in , who is admitted with severe abdominal pain, CT in the ER was unremarkable for acute pathology    # Acute abdominal pain  Unclear etiology at this point  CT abdomen pelvis in the ED is unremarkable except for small amount of ascites  On physical exam patient continues to report severe abdominal pain this morning, severely tender to touch, concern for ischemic bowel  Lactic acid checked 1.7  Plan  Repeat CT abdomen pelvis rule out ischemic bowel  GI was consulted  Consider general surgery consult if needed    #Chronic diarrhea  S/p sigmoid colectomy in   -Unchanged from baseline    #ALEKSANDER  Continue home fluoxetine    #BMI (Calculated): 25.55    I have personally reviewed the radiographs, laboratory data in Epic and decisions and statements above are based partially on this personal interpretation.                 Care Plan discussed with: Patient    Discussed:  Care Plan    Prophylaxis:  Lovenox    Disposition:  Home w/Family           ___________________________________________________    Attending Physician: Andrew Ugarte MD        Subjective:     Chief Complaint: Abdominal pain    ROS:  (bold if positive, if negative)    Tolerating PT  Tolerating Diet          Objective:   Patient continues to report severe abdominal pain, generalized    Vitals:          Last 24hrs VS reviewed since prior progress note. Most recent are:    Vitals:    25 0828   BP:    Pulse:    Resp: 18   Temp:    SpO2:      SpO2 Readings from Last 6 Encounters:   25 97%   10/14/24 99%   24 96%   24 95%          Intake/Output Summary (Last 24 hours) at 2025 0906  Last data filed at 2025 2351  Gross per 24 hour   Intake 1000 ml   Output 707 ml   Net 293 ml           Exam:     Physical Exam:    Gen: Ill-looking, in no acute distress  HEENT:  Pink conjunctivae, PERRL, hearing intact to voice, moist mucous membranes  Neck:  Supple, without masses, thyroid non-tender  Resp:  No accessory muscle use, clear breath sounds without wheezes rales or rhonchi  Card:  No murmurs, normal S1, S2 without thrills, bruits or peripheral edema  Abd:  Soft, severely tender to touch   Musc:  No cyanosis or clubbing  Skin:  No rashes or ulcers, skin turgor is good  Neuro:  Cranial nerves 3-12 are grossly intact,  strength is 5/5 bilaterally and dorsi / plantarflexion is 5/5 bilaterally, follows commands appropriately  Psych: Fair insight, oriented to person, place and time, alert        Medications Reviewed: (see below)    Lab Data Reviewed: (see below)    ______________________________________________________________________    Medications:     Current Facility-Administered Medications   Medication Dose Route Frequency    oxyCODONE (ROXICODONE) immediate release tablet 10 mg  10 mg Oral Q4H PRN    diazePAM (VALIUM) injection 10 mg  10 mg IntraVENous Q4H PRN    topiramate (TOPAMAX) tablet 100 mg  100 mg Oral Daily    FLUoxetine (PROZAC) capsule 20 mg  20 mg Oral Daily    dicyclomine (BENTYL) tablet 20 mg  20 mg Oral TID PRN    Lumateperone Tosylate (CAPLYTA) capsule 42 mg (Patient Supplied)  42 mg Oral Daily    sodium chloride flush 0.9 % injection 5-40 mL  5-40 mL IntraVENous 2 times per day    sodium chloride flush 0.9 % injection 5-40 mL  5-40 mL IntraVENous PRN    0.9 % sodium chloride infusion   IntraVENous PRN    potassium chloride (KLOR-CON) extended release tablet 40 mEq  40 mEq Oral PRN    Or    potassium bicarb-citric acid (EFFER-K) effervescent tablet 40 mEq  40 mEq Oral PRN    Or    potassium chloride 10 mEq/100 mL IVPB (Peripheral Line)  10 mEq IntraVENous PRN    magnesium sulfate 2000 mg in 50 mL IVPB premix  2,000 mg IntraVENous PRN    ondansetron (ZOFRAN-ODT) disintegrating

## 2025-04-06 NOTE — PERIOP NOTE
Pt pain is controlled at the time , requiring frequent pain meds.  Updated the rn kevin and VS S and uneffected by the narcotics.

## 2025-04-06 NOTE — ED TRIAGE NOTES
Pt arrives via wheelchair with c/o abd pain x40 min ago along her lower abd.    Pt endorses nausea, denies vomiting    PMH bowel resection in 9/2024  Pt took 3 advil 40 min PTA.

## 2025-04-07 LAB
ANION GAP SERPL CALC-SCNC: 4 MMOL/L (ref 2–12)
BUN SERPL-MCNC: 13 MG/DL (ref 6–20)
BUN/CREAT SERPL: 20 (ref 12–20)
CALCIUM SERPL-MCNC: 8.9 MG/DL (ref 8.5–10.1)
CHLORIDE SERPL-SCNC: 109 MMOL/L (ref 97–108)
CO2 SERPL-SCNC: 26 MMOL/L (ref 21–32)
CREAT SERPL-MCNC: 0.66 MG/DL (ref 0.55–1.02)
ERYTHROCYTE [DISTWIDTH] IN BLOOD BY AUTOMATED COUNT: 12.7 % (ref 11.5–14.5)
GLUCOSE SERPL-MCNC: 104 MG/DL (ref 65–100)
HCT VFR BLD AUTO: 36 % (ref 35–47)
HGB BLD-MCNC: 12.5 G/DL (ref 11.5–16)
MCH RBC QN AUTO: 32.3 PG (ref 26–34)
MCHC RBC AUTO-ENTMCNC: 34.7 G/DL (ref 30–36.5)
MCV RBC AUTO: 93 FL (ref 80–99)
NRBC # BLD: 0 K/UL (ref 0–0.01)
NRBC BLD-RTO: 0 PER 100 WBC
PLATELET # BLD AUTO: 180 K/UL (ref 150–400)
PMV BLD AUTO: 11.1 FL (ref 8.9–12.9)
POTASSIUM SERPL-SCNC: 3.5 MMOL/L (ref 3.5–5.1)
RBC # BLD AUTO: 3.87 M/UL (ref 3.8–5.2)
SODIUM SERPL-SCNC: 139 MMOL/L (ref 136–145)
WBC # BLD AUTO: 12.1 K/UL (ref 3.6–11)

## 2025-04-07 PROCEDURE — 36415 COLL VENOUS BLD VENIPUNCTURE: CPT

## 2025-04-07 PROCEDURE — 6370000000 HC RX 637 (ALT 250 FOR IP): Performed by: STUDENT IN AN ORGANIZED HEALTH CARE EDUCATION/TRAINING PROGRAM

## 2025-04-07 PROCEDURE — 1100000000 HC RM PRIVATE

## 2025-04-07 PROCEDURE — 6360000002 HC RX W HCPCS: Performed by: STUDENT IN AN ORGANIZED HEALTH CARE EDUCATION/TRAINING PROGRAM

## 2025-04-07 PROCEDURE — 2580000003 HC RX 258: Performed by: SURGERY

## 2025-04-07 PROCEDURE — 6370000000 HC RX 637 (ALT 250 FOR IP)

## 2025-04-07 PROCEDURE — 6360000002 HC RX W HCPCS: Performed by: SURGERY

## 2025-04-07 PROCEDURE — 2500000003 HC RX 250 WO HCPCS: Performed by: INTERNAL MEDICINE

## 2025-04-07 PROCEDURE — 94761 N-INVAS EAR/PLS OXIMETRY MLT: CPT

## 2025-04-07 PROCEDURE — 6360000002 HC RX W HCPCS: Performed by: INTERNAL MEDICINE

## 2025-04-07 PROCEDURE — 85027 COMPLETE CBC AUTOMATED: CPT

## 2025-04-07 PROCEDURE — 80048 BASIC METABOLIC PNL TOTAL CA: CPT

## 2025-04-07 RX ORDER — OXYCODONE HYDROCHLORIDE 5 MG/1
10 TABLET ORAL EVERY 4 HOURS PRN
Refills: 0 | Status: DISCONTINUED | OUTPATIENT
Start: 2025-04-07 | End: 2025-04-12 | Stop reason: HOSPADM

## 2025-04-07 RX ORDER — KETOROLAC TROMETHAMINE 15 MG/ML
15 INJECTION, SOLUTION INTRAMUSCULAR; INTRAVENOUS EVERY 8 HOURS
Status: COMPLETED | OUTPATIENT
Start: 2025-04-07 | End: 2025-04-10

## 2025-04-07 RX ORDER — OXYCODONE HYDROCHLORIDE 5 MG/1
5 TABLET ORAL EVERY 4 HOURS PRN
Refills: 0 | Status: DISCONTINUED | OUTPATIENT
Start: 2025-04-07 | End: 2025-04-12 | Stop reason: HOSPADM

## 2025-04-07 RX ORDER — ACETAMINOPHEN 500 MG
1000 TABLET ORAL EVERY 8 HOURS SCHEDULED
Status: DISCONTINUED | OUTPATIENT
Start: 2025-04-07 | End: 2025-04-12 | Stop reason: HOSPADM

## 2025-04-07 RX ADMIN — HYDROMORPHONE HYDROCHLORIDE 0.5 MG: 1 INJECTION, SOLUTION INTRAMUSCULAR; INTRAVENOUS; SUBCUTANEOUS at 02:30

## 2025-04-07 RX ADMIN — KETOROLAC TROMETHAMINE 15 MG: 15 INJECTION, SOLUTION INTRAMUSCULAR; INTRAVENOUS at 16:36

## 2025-04-07 RX ADMIN — SODIUM CHLORIDE, SODIUM LACTATE, POTASSIUM CHLORIDE, AND CALCIUM CHLORIDE: .6; .31; .03; .02 INJECTION, SOLUTION INTRAVENOUS at 22:31

## 2025-04-07 RX ADMIN — SODIUM CHLORIDE, PRESERVATIVE FREE 10 ML: 5 INJECTION INTRAVENOUS at 09:46

## 2025-04-07 RX ADMIN — ACETAMINOPHEN 1000 MG: 500 TABLET ORAL at 14:14

## 2025-04-07 RX ADMIN — FLUOXETINE HYDROCHLORIDE 20 MG: 20 CAPSULE ORAL at 09:46

## 2025-04-07 RX ADMIN — TOPIRAMATE 100 MG: 100 TABLET, FILM COATED ORAL at 09:46

## 2025-04-07 RX ADMIN — ACETAMINOPHEN 1000 MG: 500 TABLET ORAL at 22:19

## 2025-04-07 RX ADMIN — PIPERACILLIN AND TAZOBACTAM 3375 MG: 3; .375 INJECTION, POWDER, LYOPHILIZED, FOR SOLUTION INTRAVENOUS at 01:29

## 2025-04-07 RX ADMIN — PIPERACILLIN AND TAZOBACTAM 3375 MG: 3; .375 INJECTION, POWDER, LYOPHILIZED, FOR SOLUTION INTRAVENOUS at 16:39

## 2025-04-07 RX ADMIN — KETOROLAC TROMETHAMINE 15 MG: 15 INJECTION, SOLUTION INTRAMUSCULAR; INTRAVENOUS at 09:45

## 2025-04-07 RX ADMIN — HYDROMORPHONE HYDROCHLORIDE 0.5 MG: 1 INJECTION, SOLUTION INTRAMUSCULAR; INTRAVENOUS; SUBCUTANEOUS at 05:35

## 2025-04-07 RX ADMIN — ENOXAPARIN SODIUM 40 MG: 100 INJECTION SUBCUTANEOUS at 09:46

## 2025-04-07 RX ADMIN — PIPERACILLIN AND TAZOBACTAM 3375 MG: 3; .375 INJECTION, POWDER, LYOPHILIZED, FOR SOLUTION INTRAVENOUS at 09:57

## 2025-04-07 RX ADMIN — SODIUM CHLORIDE, SODIUM LACTATE, POTASSIUM CHLORIDE, AND CALCIUM CHLORIDE: .6; .31; .03; .02 INJECTION, SOLUTION INTRAVENOUS at 05:45

## 2025-04-07 ASSESSMENT — PAIN DESCRIPTION - DESCRIPTORS
DESCRIPTORS: ACHING

## 2025-04-07 ASSESSMENT — PAIN DESCRIPTION - LOCATION
LOCATION: ABDOMEN

## 2025-04-07 ASSESSMENT — PAIN SCALES - GENERAL
PAINLEVEL_OUTOF10: 4
PAINLEVEL_OUTOF10: 6
PAINLEVEL_OUTOF10: 7

## 2025-04-07 NOTE — CONSULTS
[facility]          [address] [place]  [address]                              CONSULTATION      PATIENT NAME: MAURA MESSER         : 1975  MED REC NO: [id number]                       ROOM: 406  ACCOUNT NO: [id number]                       ADMIT DATE: 2025  PROVIDER: Josse Ni MD    DATE OF SERVICE:  2025    ATTENDING PHYSICIAN:  SHADE WILSON    REASON FOR CONSULTATION:  Perforated viscus.    HISTORY OF PRESENT ILLNESS:  The patient is a 49-year-old woman, who presents with obvious perforated viscus.  Dr. Wilson has requested surgical evaluation recommendations for therapy.  The patient has notable past medical history for bipolar disorder and diverticulitis.  She really has no other significant medical history.  She had an appendectomy in the past and most recently in September underwent sigmoid colectomy for perforated diverticulitis with perforated sigmoid colon.  This was performed by Dr. Templeton.  She has done well since that point in time.  She developed acute onset of stabbing significant abdominal pain beginning about 7 o'clock last night.  This was throughout and has gradually worsen.  She has been having some diarrhea.  She denies any fever, chills, sweats, or weight loss.  She presented to the Emergency Department and underwent laboratory data evaluation, which revealed white count of 7.2, hemoglobin was 13.7.  Her electrolytes looked fine.  She had no acidosis and preserved renal function.  She had an initial CT scan of the abdomen yesterday, which revealed a small volume of ascites.  There was some ascites over the liver.  She had significant worsening pain, so Dr. Wilson repeated her CT scan this morning.  There was now moderate amount of free air.  There was some noted wall thickening of the proximal small bowel, concerning for possible ischemia.  Emergent surgical evaluation has been requested.    PAST MEDICAL  HISTORY:  Bipolar disorder, diverticulitis.    PAST SURGICAL HISTORY:  Appendectomy, exploratory laparotomy, sigmoid colectomy, breast biopsy.    CHRONIC HOME MEDICATIONS:  Includes Topamax, Caplyta, Prozac, Bentyl, Zofran.    ALLERGIES:  LATEX.      SOCIAL HISTORY:  The patient denies tobacco, alcohol, or drug utilization.    FAMILY HISTORY:  Notable for melanoma in her mother.    REVIEW OF SYSTEMS:  CONSTITUTIONAL:  The patient denies fever, chills, sweats, or weight loss.  EYES:  Denies photophobia or blurred vision.  ENT:  She denies aphthous ulcers, sore throat, or earache.  PULMONARY:  She denies shortness of breath, cough, pneumonia, or TB.  CARDIAC:  She denies angina or chest pain.  GI:  She has significant pain, nausea, vomiting, and recent diarrhea.  She denies melena or bright red blood per rectum.  :  She denies dysuria, hematuria, or pyuria.  MUSCULOSKELETAL:  She denies arthralgias or myalgias.  SKIN:  She denies pigmented nevi, bruising, or rash.  NEURO:  She denies seizure or stroke.  PSYCH:  She does have anxiety and bipolar disorder.    PHYSICAL EXAMINATION:  GENERAL:  Reveals the patient to be a critically ill-appearing young lady, lying supine in bed.  HEENT:  Normocephalic, atraumatic.  Extraocular movements are intact.  Sclerae nonicteric.  NECK:  Supple.  BACK:  Nontender.  LUNGS:  Clear.  CARDIAC:  Tachycardic rhythm.  ABDOMEN:  Distended and diffusely tender throughout with diffuse peritoneal signs.   AND RECTAL EXAM:  Deferred.  EXTREMITIES:  Without cyanosis, clubbing, or edema.  NEUROLOGIC:  Nonfocal.  INTEGUMENTARY:  Survey reveals no suspicious pigmented nevi.  LYMPHATIC:  Survey reveals no suspicious adenopathy.  MUSCULOSKELETAL:  Exam reveals full range of motion of upper and lower extremities.    DATA REVIEWED:  Lab data, medical records, CT scan.  I have reviewed the CT myself as well as report.    IMPRESSION:  Perforated viscus.    DISCUSSION:  I had a long discussion with the

## 2025-04-07 NOTE — PROGRESS NOTES
Hospitalist Progress Note      NAME:  Deepti Ayala   :  1975  MRM:  [id number]    Date/Time: 2025  9:54 AM           Assessment / Plan:     49-year-old female who had sigmoid colectomy for perforated diverticulitis in , who is admitted with severe abdominal pain, CT in the ER was unremarkable for acute pathology    # Acute abdominal pain  # Perforated colon   S/P Exploratory laparotomy, lysis of adhesions, resection of prior colonic anastomosis with side-to-side functional end-to-end colocolostomy.   CT abdomen pelvis in the ED is unremarkable except for small amount of ascites  I saw the patient  and on physical exam severely tender to touch, concern for ischemic bowel  CTA done: Moderate volume free air. Wall thickening of proximal small bowel, could  represent bowel ischemia and Lactic acid checked 1.7  Gen surgery was consulted, and pt was taken to the OR immediately   Pt has tolerated surgery with no complications reported,  pt is reporting significant improvement of her pain  Plan  Pain control, will d/c scheduled dilaudid, and change to prn for breakthrough, will add Toradol x 3 days   Continue zosyn   FU gen surgery     #Chronic diarrhea  S/p sigmoid colectomy in   -Unchanged from baseline    #ALEKSANDER  Continue home fluoxetine    #BMI (Calculated): 25.55    I have personally reviewed the radiographs, laboratory data in Epic and decisions and statements above are based partially on this personal interpretation.                 Care Plan discussed with: Patient    Discussed:  Care Plan    Prophylaxis:  Lovenox    Disposition:  Home w/Family           ___________________________________________________    Attending Physician: Andrew Ugarte MD        Subjective:     Chief Complaint: Abdominal pain    ROS:  (bold if positive, if negative)    Tolerating PT  Tolerating Diet          Objective:   Abd pain improved today     Vitals:          Last 24hrs VS reviewed since  Continuous    piperacillin-tazobactam (ZOSYN) 3,375 mg in sodium chloride 0.9 % 50 mL IVPB (addEASE)  3,375 mg IntraVENous Q8H    morphine (PF) injection 2 mg  2 mg IntraVENous Q4H PRN    naloxone (NARCAN) injection 0.4 mg  0.4 mg IntraVENous PRN    sodium chloride flush 0.9 % injection 5-40 mL  5-40 mL IntraVENous 2 times per day    sodium chloride flush 0.9 % injection 5-40 mL  5-40 mL IntraVENous PRN    0.9 % sodium chloride infusion   IntraVENous PRN    potassium chloride (KLOR-CON) extended release tablet 40 mEq  40 mEq Oral PRN    Or    potassium bicarb-citric acid (EFFER-K) effervescent tablet 40 mEq  40 mEq Oral PRN    Or    potassium chloride 10 mEq/100 mL IVPB (Peripheral Line)  10 mEq IntraVENous PRN    magnesium sulfate 2000 mg in 50 mL IVPB premix  2,000 mg IntraVENous PRN    ondansetron (ZOFRAN-ODT) disintegrating tablet 4 mg  4 mg Oral Q8H PRN    Or    ondansetron (ZOFRAN) injection 4 mg  4 mg IntraVENous Q6H PRN    acetaminophen (TYLENOL) tablet 650 mg  650 mg Oral Q6H PRN    Or    acetaminophen (TYLENOL) suppository 650 mg  650 mg Rectal Q6H PRN    polyethylene glycol (GLYCOLAX) packet 17 g  17 g Oral Daily PRN    enoxaparin (LOVENOX) injection 40 mg  40 mg SubCUTAneous Daily            Lab Review:     Recent Labs     04/05/25 2027 04/07/25  0924   WBC 7.2 12.1*   HGB 13.7 12.5   HCT 40.6 36.0    180     Recent Labs     04/05/25 2027      K 4.0   *   CO2 25   BUN 21*   ALT 16     No components found for: \"GLPOC\"

## 2025-04-07 NOTE — PROGRESS NOTES
Spiritual Care Partner Volunteer visited patient at [facility] in [facility] on 4/7/2025     Documented by:  Mega Valles MDiv  Staff   Paging Service [phone] (KENNY)

## 2025-04-07 NOTE — OP NOTE
[facility]          [address] [place]  [address]                            OPERATIVE REPORT      PATIENT NAME: MAURA MESSER         : 1975  MED REC NO: [id number]                       ROOM: 406  ACCOUNT NO: [id number]                       ADMIT DATE: 2025  PROVIDER: Josse Ni MD    DATE OF SERVICE:  2025    PREOPERATIVE DIAGNOSES:  Perforated bowel.    POSTOPERATIVE DIAGNOSES:  Punctate colonic perforation.    PROCEDURES PERFORMED:  Exploratory laparotomy, lysis of adhesions, resection of prior colonic anastomosis with side-to-side functional end-to-end colocolostomy.    SURGEON:  Josse Ni MD    ASSISTANT:  Jerel Kahn SA    ANESTHESIA:  General    ESTIMATED BLOOD LOSS:  None    SPECIMENS REMOVED:  Partial Colectomy    INTRAOPERATIVE FINDINGS:  See below     COMPLICATIONS:  none    IMPLANTS:  None    INDICATIONS:  The patient is a 49-year-old young lady who presents with an obvious bowel perforation after undergoing a previous sigmoid colectomy for perforated diverticulitis.  I had a long talk with the patient's family regarding options, plans to proceed with exploration with potential bowel resection.  I have discussed the operative technique in some great detail and the risks at length and the risk of bleeding, infection, failure, finding of adverse pathology, finding of benign pathology, finding of invasive malignancy, injury to the bowel, bladder, solid organs, blood vessels, need for bowel resection, need for ostomy, anastomotic leak, anastomotic dehiscence, hematoma formation, seroma formation, abscess formation, need for repair of ulcer, persistent ulcer or perforation, recurrent ulcer or perforation, wound infection, wound hernia, wound dehiscence, scarring, chronic pain, poor cosmesis, need for other interventions.  The patient and her family appeared to understand and agree with the plan.  All questions were

## 2025-04-07 NOTE — PROGRESS NOTES
Department of General Surgery - Adult  Surgical Service [facility]  Physician Assistant Progress Note      SUBJECTIVE: Patient seen and examined, mom at bedside.  Complaining of pain.  Denies nausea or vomiting.  Not passing flatus.  Has not been out of bed yet.    OBJECTIVE      Physical    VITALS:  /83   Pulse 59   Temp 99.5 °F (37.5 °C) (Oral)   Resp 20   Ht 1.6 m (5' 3\")   Wt 65.4 kg (144 lb 2.9 oz)   SpO2 97%   BMI 25.54 kg/m²   INTAKE/OUTPUT:    Intake/Output Summary (Last 24 hours) at 4/7/2025 1316  Last data filed at 4/6/2025 2300  Gross per 24 hour   Intake --   Output 750 ml   Net -750 ml     CONSTITUTIONAL:  awake, alert, cooperative, no apparent distress, and appears stated age  ABDOMEN: Distended, dressing C/D  Data  CBC:   Lab Results   Component Value Date/Time    WBC 12.1 04/07/2025 09:24 AM    RBC 3.87 04/07/2025 09:24 AM    HGB 12.5 04/07/2025 09:24 AM    HCT 36.0 04/07/2025 09:24 AM    MCV 93.0 04/07/2025 09:24 AM    MCH 32.3 04/07/2025 09:24 AM    MCHC 34.7 04/07/2025 09:24 AM    RDW 12.7 04/07/2025 09:24 AM     04/07/2025 09:24 AM    MPV 11.1 04/07/2025 09:24 AM     Hemoglobin/Hematocrit:    Lab Results   Component Value Date/Time    HGB 12.5 04/07/2025 09:24 AM    HCT 36.0 04/07/2025 09:24 AM       Current Inpatient Medications    Current Facility-Administered Medications: HYDROmorphone (DILAUDID) injection 0.5 mg, 0.5 mg, IntraVENous, Q4H PRN  ketorolac (TORADOL) injection 15 mg, 15 mg, IntraVENous, q8h  acetaminophen (TYLENOL) tablet 1,000 mg, 1,000 mg, Oral, 3 times per day  oxyCODONE (ROXICODONE) immediate release tablet 5 mg, 5 mg, Oral, Q4H PRN **OR** oxyCODONE (ROXICODONE) immediate release tablet 10 mg, 10 mg, Oral, Q4H PRN  diazePAM (VALIUM) injection 10 mg, 10 mg, IntraVENous, Q4H PRN  topiramate (TOPAMAX) tablet 100 mg, 100 mg, Oral, Daily  FLUoxetine (PROZAC) capsule 20 mg, 20 mg, Oral, Daily  dicyclomine (BENTYL) tablet 20 mg, 20 mg, Oral, TID PRN  Lumateperone  Tosylate (CAPLYTA) capsule 42 mg (Patient Supplied), 42 mg, Oral, Daily  lactated ringers infusion, , IntraVENous, Continuous  piperacillin-tazobactam (ZOSYN) 3,375 mg in sodium chloride 0.9 % 50 mL IVPB (addEASE), 3,375 mg, IntraVENous, Q8H  morphine (PF) injection 2 mg, 2 mg, IntraVENous, Q4H PRN  naloxone (NARCAN) injection 0.4 mg, 0.4 mg, IntraVENous, PRN  sodium chloride flush 0.9 % injection 5-40 mL, 5-40 mL, IntraVENous, 2 times per day  sodium chloride flush 0.9 % injection 5-40 mL, 5-40 mL, IntraVENous, PRN  0.9 % sodium chloride infusion, , IntraVENous, PRN  potassium chloride (KLOR-CON) extended release tablet 40 mEq, 40 mEq, Oral, PRN **OR** potassium bicarb-citric acid (EFFER-K) effervescent tablet 40 mEq, 40 mEq, Oral, PRN **OR** potassium chloride 10 mEq/100 mL IVPB (Peripheral Line), 10 mEq, IntraVENous, PRN  magnesium sulfate 2000 mg in 50 mL IVPB premix, 2,000 mg, IntraVENous, PRN  ondansetron (ZOFRAN-ODT) disintegrating tablet 4 mg, 4 mg, Oral, Q8H PRN **OR** ondansetron (ZOFRAN) injection 4 mg, 4 mg, IntraVENous, Q6H PRN  acetaminophen (TYLENOL) tablet 650 mg, 650 mg, Oral, Q6H PRN **OR** acetaminophen (TYLENOL) suppository 650 mg, 650 mg, Rectal, Q6H PRN  polyethylene glycol (GLYCOLAX) packet 17 g, 17 g, Oral, Daily PRN  enoxaparin (LOVENOX) injection 40 mg, 40 mg, SubCUTAneous, Daily    ASSESSMENT AND PLAN    49 y.o. female status post ex lap, lysis of adhesions, resection of prior colonic anastomosis post op day #  1    Work on pain control, scheduled Tylenol/Toradol, added p.o. oxycodone  Discontinue Esquivel  Incentive spirometer  Mobilize as able  Sips of clears today  Await return of bowel function    Luz Maria Beach PA-C

## 2025-04-07 NOTE — PLAN OF CARE
Problem: Discharge Planning  Goal: Discharge to home or other facility with appropriate resources  4/7/2025 0002 by Margarito Esteban RN  Outcome: Progressing  4/6/2025 1610 by Jerel Desai, RN  Outcome: Progressing     Problem: Pain  Goal: Verbalizes/displays adequate comfort level or baseline comfort level  4/7/2025 0002 by Margarito Esteban RN  Outcome: Progressing  4/6/2025 1610 by Jerel Desai, RN  Outcome: Progressing     Problem: Safety - Adult  Goal: Free from fall injury  4/7/2025 0002 by Margarito Esteban RN  Outcome: Progressing  4/6/2025 1610 by Jerel Desai, RN  Outcome: Progressing

## 2025-04-07 NOTE — PLAN OF CARE
Problem: Discharge Planning  Goal: Discharge to home or other facility with appropriate resources  4/7/2025 0823 by Fay Villalba LPN  Outcome: Progressing  4/7/2025 0822 by Fay Villalba LPN  Outcome: Progressing  4/7/2025 0002 by Margarito Esteban RN  Outcome: Progressing     Problem: Pain  Goal: Verbalizes/displays adequate comfort level or baseline comfort level  4/7/2025 0823 by Fay Villalba LPN  Outcome: Progressing  4/7/2025 0822 by Fay Villalba LPN  Outcome: Progressing  4/7/2025 0002 by Margarito Esteban RN  Outcome: Progressing     Problem: Safety - Adult  Goal: Free from fall injury  4/7/2025 0823 by Fay Villalba LPN  Outcome: Progressing  4/7/2025 0822 by Fay Villalba LPN  Outcome: Progressing  4/7/2025 0002 by Margarito Esteban RN  Outcome: Progressing

## 2025-04-07 NOTE — SIGNIFICANT EVENT
4/7/2025  6:13 pm     On  notified me about the patient's insurance, and is out of network at [facility], and asked me to start transfer process to the patient to [facility] facility where her insurance would be accepted.      Started transfer process 4 PM, and discussed with transfer center the current situation, patient insurance condition, and started to reach out to [facility] facilities at [facility] for transfer  Discussed with the hospitalist at [facility]    Discussed with Dr. Ni, and discussed with me that since patient was admitted as a stable patient, and was operated on and currently stable, it would be EMTALA evaluation to be transferred to another facility as patient is postoperative and currently stable    Called back  and discussed with her the current situation.    Patient transfer was declined by [facility], and close the case, patient will continue to be under our care at [facility].

## 2025-04-07 NOTE — CARE COORDINATION
4/7/25  3:48 PM    CM notified by nursing (who received a message from registration) that patients insurance is a [employer] plan and is out of network with [facility]. CM updated CM supervisor and contacted the attending to initiate the transfer process with the transfer center. CM met with the patient and her mother in the room to inform of patients insurance being OON with [facility]-answered all questions and explained transfer process.     Steffi Skelton MSW  [facility]      Available via [employer]

## 2025-04-07 NOTE — PLAN OF CARE
Problem: Discharge Planning  Goal: Discharge to home or other facility with appropriate resources  4/7/2025 0822 by Fay Villalba LPN  Outcome: Progressing  4/7/2025 0002 by Margarito Esteban RN  Outcome: Progressing     Problem: Pain  Goal: Verbalizes/displays adequate comfort level or baseline comfort level  4/7/2025 0822 by Fay Villalba LPN  Outcome: Progressing  4/7/2025 0002 by Margarito Esteban RN  Outcome: Progressing     Problem: Safety - Adult  Goal: Free from fall injury  4/7/2025 0822 by Fay Villalba LPN  Outcome: Progressing  4/7/2025 0002 by Margarito Esteban RN  Outcome: Progressing

## 2025-04-08 LAB
ANION GAP SERPL CALC-SCNC: 6 MMOL/L (ref 2–12)
BUN SERPL-MCNC: 19 MG/DL (ref 6–20)
BUN/CREAT SERPL: 27 (ref 12–20)
CALCIUM SERPL-MCNC: 8.5 MG/DL (ref 8.5–10.1)
CHLORIDE SERPL-SCNC: 111 MMOL/L (ref 97–108)
CO2 SERPL-SCNC: 24 MMOL/L (ref 21–32)
CREAT SERPL-MCNC: 0.7 MG/DL (ref 0.55–1.02)
ERYTHROCYTE [DISTWIDTH] IN BLOOD BY AUTOMATED COUNT: 12.7 % (ref 11.5–14.5)
GLUCOSE SERPL-MCNC: 90 MG/DL (ref 65–100)
HCT VFR BLD AUTO: 32.8 % (ref 35–47)
HGB BLD-MCNC: 11 G/DL (ref 11.5–16)
MCH RBC QN AUTO: 32.5 PG (ref 26–34)
MCHC RBC AUTO-ENTMCNC: 33.5 G/DL (ref 30–36.5)
MCV RBC AUTO: 97 FL (ref 80–99)
NRBC # BLD: 0 K/UL (ref 0–0.01)
NRBC BLD-RTO: 0 PER 100 WBC
PLATELET # BLD AUTO: 157 K/UL (ref 150–400)
PMV BLD AUTO: 11 FL (ref 8.9–12.9)
POTASSIUM SERPL-SCNC: 3.5 MMOL/L (ref 3.5–5.1)
RBC # BLD AUTO: 3.38 M/UL (ref 3.8–5.2)
SODIUM SERPL-SCNC: 141 MMOL/L (ref 136–145)
WBC # BLD AUTO: 10.5 K/UL (ref 3.6–11)

## 2025-04-08 PROCEDURE — 6360000002 HC RX W HCPCS: Performed by: SURGERY

## 2025-04-08 PROCEDURE — 6370000000 HC RX 637 (ALT 250 FOR IP)

## 2025-04-08 PROCEDURE — 80048 BASIC METABOLIC PNL TOTAL CA: CPT

## 2025-04-08 PROCEDURE — 94761 N-INVAS EAR/PLS OXIMETRY MLT: CPT

## 2025-04-08 PROCEDURE — 2580000003 HC RX 258

## 2025-04-08 PROCEDURE — 6360000002 HC RX W HCPCS: Performed by: INTERNAL MEDICINE

## 2025-04-08 PROCEDURE — 85027 COMPLETE CBC AUTOMATED: CPT

## 2025-04-08 PROCEDURE — 6360000002 HC RX W HCPCS: Performed by: STUDENT IN AN ORGANIZED HEALTH CARE EDUCATION/TRAINING PROGRAM

## 2025-04-08 PROCEDURE — 2580000003 HC RX 258: Performed by: SURGERY

## 2025-04-08 PROCEDURE — 6370000000 HC RX 637 (ALT 250 FOR IP): Performed by: STUDENT IN AN ORGANIZED HEALTH CARE EDUCATION/TRAINING PROGRAM

## 2025-04-08 PROCEDURE — 1100000000 HC RM PRIVATE

## 2025-04-08 PROCEDURE — 51798 US URINE CAPACITY MEASURE: CPT

## 2025-04-08 RX ADMIN — ACETAMINOPHEN 1000 MG: 500 TABLET ORAL at 13:57

## 2025-04-08 RX ADMIN — PIPERACILLIN AND TAZOBACTAM 3375 MG: 3; .375 INJECTION, POWDER, LYOPHILIZED, FOR SOLUTION INTRAVENOUS at 01:42

## 2025-04-08 RX ADMIN — PIPERACILLIN AND TAZOBACTAM 3375 MG: 3; .375 INJECTION, POWDER, LYOPHILIZED, FOR SOLUTION INTRAVENOUS at 08:54

## 2025-04-08 RX ADMIN — KETOROLAC TROMETHAMINE 15 MG: 15 INJECTION, SOLUTION INTRAMUSCULAR; INTRAVENOUS at 08:43

## 2025-04-08 RX ADMIN — TOPIRAMATE 100 MG: 100 TABLET, FILM COATED ORAL at 08:43

## 2025-04-08 RX ADMIN — OXYCODONE 10 MG: 5 TABLET ORAL at 09:10

## 2025-04-08 RX ADMIN — ACETAMINOPHEN 1000 MG: 500 TABLET ORAL at 22:50

## 2025-04-08 RX ADMIN — FLUOXETINE HYDROCHLORIDE 20 MG: 20 CAPSULE ORAL at 08:43

## 2025-04-08 RX ADMIN — KETOROLAC TROMETHAMINE 15 MG: 15 INJECTION, SOLUTION INTRAMUSCULAR; INTRAVENOUS at 00:23

## 2025-04-08 RX ADMIN — KETOROLAC TROMETHAMINE 15 MG: 15 INJECTION, SOLUTION INTRAMUSCULAR; INTRAVENOUS at 16:10

## 2025-04-08 RX ADMIN — ACETAMINOPHEN 1000 MG: 500 TABLET ORAL at 06:49

## 2025-04-08 RX ADMIN — ENOXAPARIN SODIUM 40 MG: 100 INJECTION SUBCUTANEOUS at 08:43

## 2025-04-08 RX ADMIN — PIPERACILLIN AND TAZOBACTAM 3375 MG: 3; .375 INJECTION, POWDER, LYOPHILIZED, FOR SOLUTION INTRAVENOUS at 16:53

## 2025-04-08 RX ADMIN — SODIUM CHLORIDE, SODIUM LACTATE, POTASSIUM CHLORIDE, AND CALCIUM CHLORIDE 950 ML: .6; .31; .03; .02 INJECTION, SOLUTION INTRAVENOUS at 08:42

## 2025-04-08 RX ADMIN — SODIUM CHLORIDE, SODIUM LACTATE, POTASSIUM CHLORIDE, AND CALCIUM CHLORIDE 950 ML: .6; .31; .03; .02 INJECTION, SOLUTION INTRAVENOUS at 18:15

## 2025-04-08 ASSESSMENT — PAIN SCALES - GENERAL
PAINLEVEL_OUTOF10: 4
PAINLEVEL_OUTOF10: 1
PAINLEVEL_OUTOF10: 7
PAINLEVEL_OUTOF10: 3

## 2025-04-08 ASSESSMENT — PAIN DESCRIPTION - ORIENTATION: ORIENTATION: ANTERIOR

## 2025-04-08 ASSESSMENT — PAIN DESCRIPTION - DESCRIPTORS: DESCRIPTORS: CRAMPING

## 2025-04-08 ASSESSMENT — PAIN SCALES - WONG BAKER: WONGBAKER_NUMERICALRESPONSE: NO HURT

## 2025-04-08 ASSESSMENT — PAIN DESCRIPTION - LOCATION
LOCATION: ABDOMEN
LOCATION: ABDOMEN

## 2025-04-08 NOTE — DISCHARGE INSTRUCTIONS
HOSPITALIST DISCHARGE INSTRUCTIONS  NAME:  Deepti Ayala   :  1975   MRN:  [id number]     Date/Time:  2025 10:02 AM    ADMIT DATE: 2025     DISCHARGE DATE: 2025     DISCHARGE DIAGNOSIS:  Bowel perforation    DISCHARGE INSTRUCTIONS:  Thank you for allowing us to participate in your care. Your discharging Hospitalist is Andrew Ugarte MD You were admitted for evaluation and treatment of the above.        MEDICATIONS:    It is important that you take the medication exactly as they are prescribed.   Keep your medication in the bottles provided by the pharmacist and keep a list of the medication names, dosages, and times to be taken in your wallet.   Do not take other medications without consulting your doctor.             If you experience any of the following symptoms then please call your primary care physician or return to the emergency room if you cannot get hold of your doctor:  Fever, chills, nausea, vomiting, diarrhea, change in mentation, falling, bleeding, shortness of breath    Follow Up:  Please call the below provider to arrange hospital follow up appointment      Pt's home med    Follow up  Pls return pt's home med [place] upon discharge. thanks      For questions regarding your Hospitalization or to contact the Hospital Medicine team, please call [phone].      Information obtained by :  I understand that if any problems occur once I am at home I am to contact my physician.    I understand and acknowledge receipt of the instructions indicated above.                                                                                                                                           Physician's or R.N.'s Signature                                                                  Date/Time                                                                                                                                              Patient or Representative Signature                                                           Date/Time

## 2025-04-08 NOTE — PROGRESS NOTES
[facility]  [address] [place] [address] [phone]      Hospitalist Progress Note      NAME: Deepti Ayala   :  1975  MRM:  [id number]    Date of service: 2025  8:33 AM       Assessment and Plan:   Acute abdominal pain/Perforated colon S/P Exploratory laparotomy, lysis of adhesions, resection of prior colonic anastomosis with side-to-side functional end-to-end colocolostomy.  Pain control with IV Dilaudid and Toradol.  Continue Zosyn empirically.  General surgery is following.  Has no bowel movement yet and is complaining of mild abdominal pain.  Out of bed, ambulate     2.  Hx of chronic diarrhea S/p sigmoid colectomy in .  Monitor     3.  ALEKSANDER.  Continue home fluoxetine         Subjective:     Chief Complaint:: Patient was seen and examined as a follow up for perforated colon.  Chart was reviewed.  C/O abdominal pain    ROS:  (bold if positive, if negative)    Abd Pain  Tolerating PT  Tolerating Diet     v   Objective:     Last 24hrs VS reviewed since prior progress note. Most recent are:    Vitals:    25 0754   BP: 122/76   Pulse: 53   Resp: 16   Temp: 98.2 °F (36.8 °C)   SpO2: 96%     SpO2 Readings from Last 6 Encounters:   25 96%   10/14/24 99%   24 96%   24 95%          Intake/Output Summary (Last 24 hours) at 2025 0833  Last data filed at 2025 0542  Gross per 24 hour   Intake --   Output 143 ml   Net -143 ml        Physical Exam:    Gen:  Well-developed, well-nourished, in no acute distress  HEENT:  Pink conjunctivae, PERRL, hearing intact to voice, moist mucous membranes  Neck:  Supple, without masses, thyroid non-tender  Resp:  No accessory muscle use, clear breath sounds without wheezes rales or rhonchi  Card:  No murmurs, normal S1, S2 without thrills, bruits or peripheral edema  Abd:   tender, non-distended, decreased bowel sounds are present, no palpable organomegaly and no detectable hernias  Lymph:

## 2025-04-08 NOTE — CARE COORDINATION
4/8/2025 9:02 AM Confirmed with [facility] leadership, pt will need to transfer to a [facility] facility due to insurance. Confirmed this is NOT an EMTALA violation.   [employer] sent to Dr. Maldonado requesting transfer to a [facility] facility be restarted today.  CASTILLO MagañaW

## 2025-04-08 NOTE — PROGRESS NOTES
Department of General Surgery - Adult  Surgical Service [facility]  Physician Assistant Progress Note      SUBJECTIVE: Patient seen and examined.  Complaining of muscle spasm-like pain and discomfort.  Not passing flatus.  Patient says she only voided once yesterday shortly after Esquivel was removed.  Has not been able to urinate since then.  Afraid to try due to pain. No nausea or vomiting.    OBJECTIVE      Physical    VITALS:  /76   Pulse 53   Temp 98.2 °F (36.8 °C) (Oral)   Resp 16   Ht 1.6 m (5' 3\")   Wt 65.4 kg (144 lb 2.9 oz)   SpO2 96%   BMI 25.54 kg/m²   INTAKE/OUTPUT:    Intake/Output Summary (Last 24 hours) at 4/8/2025 0924  Last data filed at 4/8/2025 0542  Gross per 24 hour   Intake --   Output 143 ml   Net -143 ml     CONSTITUTIONAL:  awake, alert, cooperative, no apparent distress, and appears stated age  ABDOMEN: Distended, dressing C/D.  MADIHA drain serosanguineous  Data  CBC:   Lab Results   Component Value Date/Time    WBC 10.5 04/08/2025 04:13 AM    RBC 3.38 04/08/2025 04:13 AM    HGB 11.0 04/08/2025 04:13 AM    HCT 32.8 04/08/2025 04:13 AM    MCV 97.0 04/08/2025 04:13 AM    MCH 32.5 04/08/2025 04:13 AM    MCHC 33.5 04/08/2025 04:13 AM    RDW 12.7 04/08/2025 04:13 AM     04/08/2025 04:13 AM    MPV 11.0 04/08/2025 04:13 AM     Hemoglobin/Hematocrit:    Lab Results   Component Value Date/Time    HGB 11.0 04/08/2025 04:13 AM    HCT 32.8 04/08/2025 04:13 AM       Current Inpatient Medications    Current Facility-Administered Medications: HYDROmorphone (DILAUDID) injection 0.5 mg, 0.5 mg, IntraVENous, Q4H PRN  ketorolac (TORADOL) injection 15 mg, 15 mg, IntraVENous, q8h  acetaminophen (TYLENOL) tablet 1,000 mg, 1,000 mg, Oral, 3 times per day  oxyCODONE (ROXICODONE) immediate release tablet 5 mg, 5 mg, Oral, Q4H PRN **OR** oxyCODONE (ROXICODONE) immediate release tablet 10 mg, 10 mg, Oral, Q4H PRN  diazePAM (VALIUM) injection 10 mg, 10 mg, IntraVENous, Q4H PRN  topiramate (TOPAMAX) tablet 100

## 2025-04-08 NOTE — DISCHARGE SUMMARY
Hospitalist Discharge Summary     Patient ID:    Deepti Ayala  [id number]  49 y.o.  1975    Admit date of service: 4/5/2025    Discharge date of service: 4/8/2025    Admission Diagnoses: Intractable abdominal pain [R10.9]    Chronic Diagnoses:      Discharge Medications:   Current Discharge Medication List        CONTINUE these medications which have NOT CHANGED    Details   topiramate (TOPAMAX) 100 MG tablet Take 1 tablet by mouth daily      Lumateperone Tosylate (CAPLYTA) 21 MG CAPS Take 2 capsules by mouth daily      FLUoxetine (PROZAC) 20 MG capsule Take 1 capsule by mouth daily           STOP taking these medications       naltrexone (DEPADE) 50 MG tablet Comments:   Reason for Stopping:         dicyclomine (BENTYL) 20 MG tablet Comments:   Reason for Stopping:         ondansetron (ZOFRAN) 4 MG tablet Comments:   Reason for Stopping:               Follow up Care:    1. Pt's home med    Follow up  Pls return pt's home med [place] upon discharge. thanks   in 1-2 weeks  2.      Diet:   NPO    Disposition:  [facility] .    Advanced Directive:    Discharge Exam:  See today's note.    CONSULTATIONS: general surgery    Significant Diagnostic Studies:   Recent Labs     04/07/25 0924 04/08/25  0413   WBC 12.1* 10.5   HGB 12.5 11.0*   HCT 36.0 32.8*    157     Recent Labs     04/05/25 2027 04/07/25  0924 04/08/25  0413    139 141   K 4.0 3.5 3.5   * 109* 111*   CO2 25 26 24   BUN 21* 13 19     Recent Labs     04/05/25 2027   ALT 16   GLOB 3.2     No results for input(s): \"INR\", \"APTT\" in the last 72 hours.    Invalid input(s): \"PTP\"   No results for input(s): \"TIBC\" in the last 72 hours.    Invalid input(s): \"FE\", \"PSAT\", \"FERR\"   No results for input(s): \"PH\", \"PCO2\", \"PO2\" in the last 72 hours.  No results for input(s): \"CPK\", \"CKMB\", \"TROPONINI\" in the last 72 hours.  No results found for: \"GLUCPOC\"    Physical Exam:    Gen:  Well-developed, well-nourished, in no acute

## 2025-04-08 NOTE — PLAN OF CARE
Problem: Discharge Planning  Goal: Discharge to home or other facility with appropriate resources  4/8/2025 1022 by Phuong Sanchez RN  Outcome: Progressing  4/8/2025 0155 by Jordyn Allan RN  Outcome: Progressing     Problem: Pain  Goal: Verbalizes/displays adequate comfort level or baseline comfort level  4/8/2025 1022 by Phuong Sanchez RN  Outcome: Progressing  4/8/2025 0155 by Jordyn Allan RN  Outcome: Progressing     Problem: Safety - Adult  Goal: Free from fall injury  4/8/2025 1022 by Phuong Sanchez RN  Outcome: Progressing  4/8/2025 0155 by Jordyn Allan RN  Outcome: Progressing     Problem: Skin/Tissue Integrity  Goal: Skin integrity remains intact  Description: 1.  Monitor for areas of redness and/or skin breakdown  2.  Assess vascular access sites hourly  3.  Every 4-6 hours minimum:  Change oxygen saturation probe site  4.  Every 4-6 hours:  If on nasal continuous positive airway pressure, respiratory therapy assess nares and determine need for appliance change or resting period  4/8/2025 1022 by Phuong Sanchez RN  Outcome: Progressing  4/8/2025 0155 by Jordyn Allan RN  Outcome: Progressing

## 2025-04-09 LAB
ANION GAP SERPL CALC-SCNC: 7 MMOL/L (ref 2–12)
BUN SERPL-MCNC: 26 MG/DL (ref 6–20)
BUN/CREAT SERPL: 38 (ref 12–20)
CALCIUM SERPL-MCNC: 8.9 MG/DL (ref 8.5–10.1)
CHLORIDE SERPL-SCNC: 110 MMOL/L (ref 97–108)
CO2 SERPL-SCNC: 24 MMOL/L (ref 21–32)
CREAT SERPL-MCNC: 0.68 MG/DL (ref 0.55–1.02)
ERYTHROCYTE [DISTWIDTH] IN BLOOD BY AUTOMATED COUNT: 12.7 % (ref 11.5–14.5)
GLUCOSE SERPL-MCNC: 84 MG/DL (ref 65–100)
HCT VFR BLD AUTO: 31 % (ref 35–47)
HGB BLD-MCNC: 10.5 G/DL (ref 11.5–16)
MCH RBC QN AUTO: 32.1 PG (ref 26–34)
MCHC RBC AUTO-ENTMCNC: 33.9 G/DL (ref 30–36.5)
MCV RBC AUTO: 94.8 FL (ref 80–99)
NRBC # BLD: 0 K/UL (ref 0–0.01)
NRBC BLD-RTO: 0 PER 100 WBC
PLATELET # BLD AUTO: 164 K/UL (ref 150–400)
PMV BLD AUTO: 11.3 FL (ref 8.9–12.9)
POTASSIUM SERPL-SCNC: 3.1 MMOL/L (ref 3.5–5.1)
RBC # BLD AUTO: 3.27 M/UL (ref 3.8–5.2)
SODIUM SERPL-SCNC: 141 MMOL/L (ref 136–145)
WBC # BLD AUTO: 12.6 K/UL (ref 3.6–11)

## 2025-04-09 PROCEDURE — 6360000002 HC RX W HCPCS: Performed by: SURGERY

## 2025-04-09 PROCEDURE — 94761 N-INVAS EAR/PLS OXIMETRY MLT: CPT

## 2025-04-09 PROCEDURE — 2580000003 HC RX 258: Performed by: SURGERY

## 2025-04-09 PROCEDURE — 6370000000 HC RX 637 (ALT 250 FOR IP): Performed by: INTERNAL MEDICINE

## 2025-04-09 PROCEDURE — 6370000000 HC RX 637 (ALT 250 FOR IP)

## 2025-04-09 PROCEDURE — 6370000000 HC RX 637 (ALT 250 FOR IP): Performed by: STUDENT IN AN ORGANIZED HEALTH CARE EDUCATION/TRAINING PROGRAM

## 2025-04-09 PROCEDURE — 6360000002 HC RX W HCPCS: Performed by: STUDENT IN AN ORGANIZED HEALTH CARE EDUCATION/TRAINING PROGRAM

## 2025-04-09 PROCEDURE — 80048 BASIC METABOLIC PNL TOTAL CA: CPT

## 2025-04-09 PROCEDURE — 1100000000 HC RM PRIVATE

## 2025-04-09 PROCEDURE — 87506 IADNA-DNA/RNA PROBE TQ 6-11: CPT

## 2025-04-09 PROCEDURE — 2580000003 HC RX 258: Performed by: INTERNAL MEDICINE

## 2025-04-09 PROCEDURE — 6360000002 HC RX W HCPCS: Performed by: INTERNAL MEDICINE

## 2025-04-09 PROCEDURE — 85027 COMPLETE CBC AUTOMATED: CPT

## 2025-04-09 RX ORDER — POTASSIUM CHLORIDE 750 MG/1
40 TABLET, EXTENDED RELEASE ORAL ONCE
Status: COMPLETED | OUTPATIENT
Start: 2025-04-09 | End: 2025-04-09

## 2025-04-09 RX ORDER — SODIUM CHLORIDE, SODIUM LACTATE, POTASSIUM CHLORIDE, CALCIUM CHLORIDE 600; 310; 30; 20 MG/100ML; MG/100ML; MG/100ML; MG/100ML
INJECTION, SOLUTION INTRAVENOUS CONTINUOUS
Status: DISCONTINUED | OUTPATIENT
Start: 2025-04-09 | End: 2025-04-11

## 2025-04-09 RX ORDER — LACTOBACILLUS RHAMNOSUS GG 10B CELL
1 CAPSULE ORAL
Status: DISCONTINUED | OUTPATIENT
Start: 2025-04-09 | End: 2025-04-12 | Stop reason: HOSPADM

## 2025-04-09 RX ADMIN — POTASSIUM CHLORIDE 40 MEQ: 750 TABLET, FILM COATED, EXTENDED RELEASE ORAL at 06:55

## 2025-04-09 RX ADMIN — FLUOXETINE HYDROCHLORIDE 20 MG: 20 CAPSULE ORAL at 08:19

## 2025-04-09 RX ADMIN — KETOROLAC TROMETHAMINE 15 MG: 15 INJECTION, SOLUTION INTRAMUSCULAR; INTRAVENOUS at 16:29

## 2025-04-09 RX ADMIN — PIPERACILLIN AND TAZOBACTAM 3375 MG: 3; .375 INJECTION, POWDER, LYOPHILIZED, FOR SOLUTION INTRAVENOUS at 00:59

## 2025-04-09 RX ADMIN — KETOROLAC TROMETHAMINE 15 MG: 15 INJECTION, SOLUTION INTRAMUSCULAR; INTRAVENOUS at 00:38

## 2025-04-09 RX ADMIN — ACETAMINOPHEN 1000 MG: 500 TABLET ORAL at 06:55

## 2025-04-09 RX ADMIN — SODIUM CHLORIDE, SODIUM LACTATE, POTASSIUM CHLORIDE, AND CALCIUM CHLORIDE 950 ML: .6; .31; .03; .02 INJECTION, SOLUTION INTRAVENOUS at 18:27

## 2025-04-09 RX ADMIN — ENOXAPARIN SODIUM 40 MG: 100 INJECTION SUBCUTANEOUS at 08:20

## 2025-04-09 RX ADMIN — ACETAMINOPHEN 1000 MG: 500 TABLET ORAL at 13:41

## 2025-04-09 RX ADMIN — PIPERACILLIN AND TAZOBACTAM 3375 MG: 3; .375 INJECTION, POWDER, LYOPHILIZED, FOR SOLUTION INTRAVENOUS at 08:25

## 2025-04-09 RX ADMIN — Medication 1 CAPSULE: at 08:24

## 2025-04-09 RX ADMIN — SODIUM CHLORIDE, SODIUM LACTATE, POTASSIUM CHLORIDE, AND CALCIUM CHLORIDE 950 ML: .6; .31; .03; .02 INJECTION, SOLUTION INTRAVENOUS at 08:18

## 2025-04-09 RX ADMIN — PIPERACILLIN AND TAZOBACTAM 3375 MG: 3; .375 INJECTION, POWDER, LYOPHILIZED, FOR SOLUTION INTRAVENOUS at 16:30

## 2025-04-09 RX ADMIN — TOPIRAMATE 100 MG: 100 TABLET, FILM COATED ORAL at 08:19

## 2025-04-09 RX ADMIN — KETOROLAC TROMETHAMINE 15 MG: 15 INJECTION, SOLUTION INTRAMUSCULAR; INTRAVENOUS at 08:20

## 2025-04-09 RX ADMIN — ACETAMINOPHEN 1000 MG: 500 TABLET ORAL at 22:47

## 2025-04-09 NOTE — CARE COORDINATION
Care Management Progress Note    Reason for Admission:   Intractable abdominal pain [R10.9]  Procedure(s) (LRB):  LAPAROTOMY EXPLORATORY (N/A)  3 Days Post-Op    Patient Admission Status: Inpatient  RUR: 10%  Hospitalization in the last 30 days (Readmission):  No        Transition of care plan:  Discussed in IDR, discharfe end of week per surgeon  NO CM needs identified  Discharge plan communicated with patient and/or discharge caregiver: No    Date 1st IMM letter given:   Outpatient follow-up.  Transport at discharge: family car

## 2025-04-09 NOTE — PROGRESS NOTES
Department of General Surgery - Adult  Surgical Service [facility]  Physician Assistant Progress Note      SUBJECTIVE: Patient seen and examined.  Mom at bedside.  Feels better today.  Pain is appropriately controlled.  Having bowel movements with liquid stool.  Tolerating diet, though has diminished appetite.  No new complaints.    OBJECTIVE      Physical    VITALS:  /65   Pulse 61   Temp 97.9 °F (36.6 °C) (Oral)   Resp 15   Ht 1.6 m (5' 3\")   Wt 65.4 kg (144 lb 2.9 oz)   SpO2 97%   BMI 25.54 kg/m²   INTAKE/OUTPUT:    Intake/Output Summary (Last 24 hours) at 4/9/2025 1356  Last data filed at 4/9/2025 0650  Gross per 24 hour   Intake --   Output 139 ml   Net -139 ml     CONSTITUTIONAL:  awake, alert, cooperative, no apparent distress, and appears stated age  ABDOMEN: Less distended, appropriately tender, incision C/D/I.  MADIHA drain murky/cloudy light brown output  Data  CBC:   Lab Results   Component Value Date/Time    WBC 12.6 04/09/2025 03:02 AM    RBC 3.27 04/09/2025 03:02 AM    HGB 10.5 04/09/2025 03:02 AM    HCT 31.0 04/09/2025 03:02 AM    MCV 94.8 04/09/2025 03:02 AM    MCH 32.1 04/09/2025 03:02 AM    MCHC 33.9 04/09/2025 03:02 AM    RDW 12.7 04/09/2025 03:02 AM     04/09/2025 03:02 AM    MPV 11.3 04/09/2025 03:02 AM     Hemoglobin/Hematocrit:    Lab Results   Component Value Date/Time    HGB 10.5 04/09/2025 03:02 AM    HCT 31.0 04/09/2025 03:02 AM       Current Inpatient Medications    Current Facility-Administered Medications: lactated ringers infusion, , IntraVENous, Continuous  lactobacillus (CULTURELLE) capsule 1 capsule, 1 capsule, Oral, Daily with breakfast  HYDROmorphone (DILAUDID) injection 0.5 mg, 0.5 mg, IntraVENous, Q4H PRN  ketorolac (TORADOL) injection 15 mg, 15 mg, IntraVENous, q8h  acetaminophen (TYLENOL) tablet 1,000 mg, 1,000 mg, Oral, 3 times per day  oxyCODONE (ROXICODONE) immediate release tablet 5 mg, 5 mg, Oral, Q4H PRN **OR** oxyCODONE (ROXICODONE) immediate release tablet 10

## 2025-04-09 NOTE — PROGRESS NOTES
[facility] [place]  [address] [place] [address] [phone]      Hospitalist Progress Note      NAME: Deepti Ayala   :  1975  MRM:  [id number]    Date of service: 2025  8:10 AM       Assessment and Plan:   Acute abdominal pain/Perforated colon S/P Exploratory laparotomy, lysis of adhesions, resection of prior colonic anastomosis with side-to-side functional end-to-end colocolostomy on .  Pain control with IV Dilaudid and Toradol.  Continue Zosyn empirically.  General surgery is following(Dr Josse Ni).  On full liquid diet.  Out of bed, ambulate.      2.  Diarrhea.  Patient had episodes of loose and watery diarrhea.  Possibly due to antibiotics.  Started on lactobacillus     3.  Hx divericulitis S/p sigmoid colectomy in .  Monitor     4.  ALEKSANDER.  Continue home fluoxetine            Subjective:     Chief Complaint:: Patient was seen and examined as a follow up for perforated colon.  Chart was reviewed.  C/O diarrhea    ROS:  (bold if positive, if negative)    Diarrhea  Tolerating PT  Tolerating Diet     v   Objective:     Last 24hrs VS reviewed since prior progress note. Most recent are:    Vitals:    25 0752   BP: 126/72   Pulse: 50   Resp: 14   Temp: 97.7 °F (36.5 °C)   SpO2: 97%     SpO2 Readings from Last 6 Encounters:   25 97%   10/14/24 99%   24 96%   24 95%          Intake/Output Summary (Last 24 hours) at 2025 0810  Last data filed at 2025 0650  Gross per 24 hour   Intake --   Output 189 ml   Net -189 ml        Physical Exam:    Gen:  Well-developed, well-nourished, in no acute distress  HEENT:  Pink conjunctivae, PERRL, hearing intact to voice, moist mucous membranes  Neck:  Supple, without masses, thyroid non-tender  Resp:  No accessory muscle use, clear breath sounds without wheezes rales or rhonchi  Card:  No murmurs, normal S1, S2 without thrills, bruits or peripheral edema  Abd:  Soft, non-tender,  non-distended, normoactive bowel sounds are present, no palpable organomegaly and no detectable hernias  Lymph:  No cervical or inguinal adenopathy  Musc:  No cyanosis or clubbing  Skin:  No rashes or ulcers, skin turgor is good  Neuro:  Cranial nerves are grossly intact, no focal motor weakness, follows commands appropriately  Psych:  Good insight, oriented to person, place and time, alert  __________________________________________________________________  Medications Reviewed: (see below)  Medications:     Current Facility-Administered Medications   Medication Dose Route Frequency    lactated ringers infusion   IntraVENous Continuous    HYDROmorphone (DILAUDID) injection 0.5 mg  0.5 mg IntraVENous Q4H PRN    ketorolac (TORADOL) injection 15 mg  15 mg IntraVENous q8h    acetaminophen (TYLENOL) tablet 1,000 mg  1,000 mg Oral 3 times per day    oxyCODONE (ROXICODONE) immediate release tablet 5 mg  5 mg Oral Q4H PRN    Or    oxyCODONE (ROXICODONE) immediate release tablet 10 mg  10 mg Oral Q4H PRN    diazePAM (VALIUM) injection 10 mg  10 mg IntraVENous Q4H PRN    topiramate (TOPAMAX) tablet 100 mg  100 mg Oral Daily    FLUoxetine (PROZAC) capsule 20 mg  20 mg Oral Daily    dicyclomine (BENTYL) tablet 20 mg  20 mg Oral TID PRN    Lumateperone Tosylate (CAPLYTA) capsule 42 mg (Patient Supplied)  42 mg Oral Daily    piperacillin-tazobactam (ZOSYN) 3,375 mg in sodium chloride 0.9 % 50 mL IVPB (addEASE)  3,375 mg IntraVENous Q8H    morphine (PF) injection 2 mg  2 mg IntraVENous Q4H PRN    naloxone (NARCAN) injection 0.4 mg  0.4 mg IntraVENous PRN    sodium chloride flush 0.9 % injection 5-40 mL  5-40 mL IntraVENous 2 times per day    sodium chloride flush 0.9 % injection 5-40 mL  5-40 mL IntraVENous PRN    0.9 % sodium chloride infusion   IntraVENous PRN    potassium chloride (KLOR-CON) extended release tablet 40 mEq  40 mEq Oral PRN    Or    potassium bicarb-citric acid (EFFER-K) effervescent tablet 40 mEq  40 mEq Oral

## 2025-04-09 NOTE — PLAN OF CARE
Problem: Discharge Planning  Goal: Discharge to home or other facility with appropriate resources  4/9/2025 0912 by Phuong Sanchez RN  Outcome: Progressing  4/9/2025 0714 by Jordyn Allan RN  Outcome: Progressing     Problem: Pain  Goal: Verbalizes/displays adequate comfort level or baseline comfort level  4/9/2025 0912 by Phuong Sanchez RN  Outcome: Progressing  4/9/2025 0714 by Jordyn Allan RN  Outcome: Progressing     Problem: Safety - Adult  Goal: Free from fall injury  4/9/2025 0912 by Phuong Sanchez RN  Outcome: Progressing  4/9/2025 0714 by Jordyn Allan RN  Outcome: Progressing     Problem: Skin/Tissue Integrity  Goal: Skin integrity remains intact  Description: 1.  Monitor for areas of redness and/or skin breakdown  2.  Assess vascular access sites hourly  3.  Every 4-6 hours minimum:  Change oxygen saturation probe site  4.  Every 4-6 hours:  If on nasal continuous positive airway pressure, respiratory therapy assess nares and determine need for appliance change or resting period  4/9/2025 0912 by Phuong Sanchez RN  Outcome: Progressing  4/9/2025 0714 by Jordyn Allan RN  Outcome: Progressing

## 2025-04-10 LAB
ANION GAP SERPL CALC-SCNC: 9 MMOL/L (ref 2–12)
BASOPHILS # BLD: 0.02 K/UL (ref 0–0.1)
BASOPHILS NFR BLD: 0.2 % (ref 0–1)
BUN SERPL-MCNC: 24 MG/DL (ref 6–20)
BUN/CREAT SERPL: 43 (ref 12–20)
C COLI+JEJUNI TUF STL QL NAA+PROBE: NEGATIVE
CALCIUM SERPL-MCNC: 8.3 MG/DL (ref 8.5–10.1)
CHLORIDE SERPL-SCNC: 111 MMOL/L (ref 97–108)
CO2 SERPL-SCNC: 21 MMOL/L (ref 21–32)
CREAT SERPL-MCNC: 0.56 MG/DL (ref 0.55–1.02)
DIFFERENTIAL METHOD BLD: ABNORMAL
EC STX1+STX2 GENES STL QL NAA+PROBE: NEGATIVE
EOSINOPHIL # BLD: 0.08 K/UL (ref 0–0.4)
EOSINOPHIL NFR BLD: 0.8 % (ref 0–7)
ERYTHROCYTE [DISTWIDTH] IN BLOOD BY AUTOMATED COUNT: 12.9 % (ref 11.5–14.5)
ETEC ELTA+ESTB GENES STL QL NAA+PROBE: NEGATIVE
GLUCOSE SERPL-MCNC: 82 MG/DL (ref 65–100)
HCT VFR BLD AUTO: 32.5 % (ref 35–47)
HGB BLD-MCNC: 10.7 G/DL (ref 11.5–16)
IMM GRANULOCYTES # BLD AUTO: 0.06 K/UL (ref 0–0.04)
IMM GRANULOCYTES NFR BLD AUTO: 0.6 % (ref 0–0.5)
LYMPHOCYTES # BLD: 0.97 K/UL (ref 0.8–3.5)
LYMPHOCYTES NFR BLD: 10 % (ref 12–49)
MAGNESIUM SERPL-MCNC: 1.7 MG/DL (ref 1.6–2.4)
MCH RBC QN AUTO: 32 PG (ref 26–34)
MCHC RBC AUTO-ENTMCNC: 32.9 G/DL (ref 30–36.5)
MCV RBC AUTO: 97.3 FL (ref 80–99)
MONOCYTES # BLD: 0.66 K/UL (ref 0–1)
MONOCYTES NFR BLD: 6.8 % (ref 5–13)
NEUTS SEG # BLD: 7.95 K/UL (ref 1.8–8)
NEUTS SEG NFR BLD: 81.6 % (ref 32–75)
NRBC # BLD: 0 K/UL (ref 0–0.01)
NRBC BLD-RTO: 0 PER 100 WBC
P SHIGELLOIDES DNA STL QL NAA+PROBE: NEGATIVE
PHOSPHATE SERPL-MCNC: 3.2 MG/DL (ref 2.6–4.7)
PLATELET # BLD AUTO: 196 K/UL (ref 150–400)
PMV BLD AUTO: 11.2 FL (ref 8.9–12.9)
POTASSIUM SERPL-SCNC: 3.3 MMOL/L (ref 3.5–5.1)
RBC # BLD AUTO: 3.34 M/UL (ref 3.8–5.2)
SALMONELLA SP SPAO STL QL NAA+PROBE: NEGATIVE
SHIGELLA SP+EIEC IPAH STL QL NAA+PROBE: NEGATIVE
SODIUM SERPL-SCNC: 141 MMOL/L (ref 136–145)
V CHOL+PARA+VUL DNA STL QL NAA+NON-PROBE: NEGATIVE
WBC # BLD AUTO: 9.7 K/UL (ref 3.6–11)
Y ENTEROCOL DNA STL QL NAA+NON-PROBE: NEGATIVE

## 2025-04-10 PROCEDURE — 6360000002 HC RX W HCPCS: Performed by: SURGERY

## 2025-04-10 PROCEDURE — 85025 COMPLETE CBC W/AUTO DIFF WBC: CPT

## 2025-04-10 PROCEDURE — 2580000003 HC RX 258: Performed by: SURGERY

## 2025-04-10 PROCEDURE — 6360000002 HC RX W HCPCS: Performed by: INTERNAL MEDICINE

## 2025-04-10 PROCEDURE — 6370000000 HC RX 637 (ALT 250 FOR IP)

## 2025-04-10 PROCEDURE — 6370000000 HC RX 637 (ALT 250 FOR IP): Performed by: INTERNAL MEDICINE

## 2025-04-10 PROCEDURE — 83735 ASSAY OF MAGNESIUM: CPT

## 2025-04-10 PROCEDURE — 2580000003 HC RX 258: Performed by: INTERNAL MEDICINE

## 2025-04-10 PROCEDURE — 1100000000 HC RM PRIVATE

## 2025-04-10 PROCEDURE — 2500000003 HC RX 250 WO HCPCS: Performed by: INTERNAL MEDICINE

## 2025-04-10 PROCEDURE — 84100 ASSAY OF PHOSPHORUS: CPT

## 2025-04-10 PROCEDURE — 80048 BASIC METABOLIC PNL TOTAL CA: CPT

## 2025-04-10 PROCEDURE — 6360000002 HC RX W HCPCS: Performed by: STUDENT IN AN ORGANIZED HEALTH CARE EDUCATION/TRAINING PROGRAM

## 2025-04-10 PROCEDURE — 6370000000 HC RX 637 (ALT 250 FOR IP): Performed by: STUDENT IN AN ORGANIZED HEALTH CARE EDUCATION/TRAINING PROGRAM

## 2025-04-10 PROCEDURE — 94761 N-INVAS EAR/PLS OXIMETRY MLT: CPT

## 2025-04-10 RX ORDER — POTASSIUM CHLORIDE 750 MG/1
40 TABLET, EXTENDED RELEASE ORAL ONCE
Status: COMPLETED | OUTPATIENT
Start: 2025-04-10 | End: 2025-04-10

## 2025-04-10 RX ADMIN — ENOXAPARIN SODIUM 40 MG: 100 INJECTION SUBCUTANEOUS at 08:28

## 2025-04-10 RX ADMIN — KETOROLAC TROMETHAMINE 15 MG: 15 INJECTION, SOLUTION INTRAMUSCULAR; INTRAVENOUS at 00:13

## 2025-04-10 RX ADMIN — OXYCODONE 5 MG: 5 TABLET ORAL at 10:19

## 2025-04-10 RX ADMIN — ACETAMINOPHEN 1000 MG: 500 TABLET ORAL at 05:57

## 2025-04-10 RX ADMIN — SODIUM CHLORIDE, SODIUM LACTATE, POTASSIUM CHLORIDE, AND CALCIUM CHLORIDE: .6; .31; .03; .02 INJECTION, SOLUTION INTRAVENOUS at 04:03

## 2025-04-10 RX ADMIN — POTASSIUM CHLORIDE 40 MEQ: 750 TABLET, FILM COATED, EXTENDED RELEASE ORAL at 08:27

## 2025-04-10 RX ADMIN — FLUOXETINE HYDROCHLORIDE 20 MG: 20 CAPSULE ORAL at 08:28

## 2025-04-10 RX ADMIN — PIPERACILLIN AND TAZOBACTAM 3375 MG: 3; .375 INJECTION, POWDER, LYOPHILIZED, FOR SOLUTION INTRAVENOUS at 18:08

## 2025-04-10 RX ADMIN — PIPERACILLIN AND TAZOBACTAM 3375 MG: 3; .375 INJECTION, POWDER, LYOPHILIZED, FOR SOLUTION INTRAVENOUS at 08:34

## 2025-04-10 RX ADMIN — ONDANSETRON 4 MG: 2 INJECTION, SOLUTION INTRAMUSCULAR; INTRAVENOUS at 10:19

## 2025-04-10 RX ADMIN — PIPERACILLIN AND TAZOBACTAM 3375 MG: 3; .375 INJECTION, POWDER, LYOPHILIZED, FOR SOLUTION INTRAVENOUS at 01:08

## 2025-04-10 RX ADMIN — Medication 1 CAPSULE: at 08:28

## 2025-04-10 RX ADMIN — SODIUM CHLORIDE, PRESERVATIVE FREE 10 ML: 5 INJECTION INTRAVENOUS at 22:05

## 2025-04-10 RX ADMIN — ACETAMINOPHEN 1000 MG: 500 TABLET ORAL at 13:52

## 2025-04-10 RX ADMIN — ACETAMINOPHEN 1000 MG: 500 TABLET ORAL at 22:02

## 2025-04-10 RX ADMIN — POTASSIUM CHLORIDE 40 MEQ: 750 TABLET, FILM COATED, EXTENDED RELEASE ORAL at 05:57

## 2025-04-10 RX ADMIN — TOPIRAMATE 100 MG: 100 TABLET, FILM COATED ORAL at 08:28

## 2025-04-10 RX ADMIN — OXYCODONE 5 MG: 5 TABLET ORAL at 22:02

## 2025-04-10 ASSESSMENT — PAIN SCALES - GENERAL
PAINLEVEL_OUTOF10: 1
PAINLEVEL_OUTOF10: 2
PAINLEVEL_OUTOF10: 5
PAINLEVEL_OUTOF10: 3
PAINLEVEL_OUTOF10: 1
PAINLEVEL_OUTOF10: 5

## 2025-04-10 ASSESSMENT — PAIN DESCRIPTION - ORIENTATION
ORIENTATION: MID
ORIENTATION: MID

## 2025-04-10 ASSESSMENT — PAIN DESCRIPTION - DESCRIPTORS
DESCRIPTORS: CRAMPING

## 2025-04-10 ASSESSMENT — PAIN DESCRIPTION - LOCATION
LOCATION: ABDOMEN

## 2025-04-10 NOTE — CARE COORDINATION
4/10/2025  3:18 PM      ICD-10-CM    1. Intractable abdominal pain  R10.9           General Risk Score: 4   Values used to calculate this score:    Points  Metrics       0        Age: 49       2        Hospital Admissions: 2       1        ED Visits: 1       0        Has Chronic Obstructive Pulmonary Disease: No       0        Has Diabetes Excluding Gestational Diabetes: No       0        Has Congestive Heart Failure: No       0        Has Liver Disease: No       1        Has Depression: Yes       0        Current PCP: Ally Sears MD       0        Has Medicaid: No      CM reviewed EMR. No CM needs indicated at this time. Per IDR, estimated discharge date is 4/11/25. Providers, if needs arise, please consult case management.   Thank you.  SAW Magaña      04/10/25 1518   Discharge Planning   DME Ordered? No   Type of Home Care Services None   Patient expects to be discharged to: House   Services At/After Discharge   Transition of Care Consult (CM Consult) N/A   Services At/After Discharge None    Resource Information Provided? No

## 2025-04-10 NOTE — PROGRESS NOTES
Department of General Surgery - Adult  Surgical Service [facility]  Physician Assistant Progress Note      SUBJECTIVE: Patient seen and examined. Mom at bedside. Admits to some more pain today, however has not been taking oxycodone and Toradol has completed duration of therapy. Afraid of nausea from oxycodone. Tolerating more fulls today, cream of wheat for bfast. Continues to have bowel movements. Agrees current hospital course better in comparison to prior.     OBJECTIVE      Physical    VITALS:  /77   Pulse 64   Temp 98.1 °F (36.7 °C) (Oral)   Resp 16   Ht 1.6 m (5' 3\")   Wt 65.4 kg (144 lb 2.9 oz)   SpO2 96%   BMI 25.54 kg/m²   INTAKE/OUTPUT:    Intake/Output Summary (Last 24 hours) at 4/10/2025 1135  Last data filed at 4/10/2025 0508  Gross per 24 hour   Intake --   Output 7 ml   Net -7 ml     CONSTITUTIONAL:  awake, alert, cooperative, no apparent distress, and appears stated age  ABDOMEN: nondistended, more tender, incision C/D/I.  MADIHA drain murky/cloudy light brown output similar to yesterday  Data  CBC:   Lab Results   Component Value Date/Time    WBC 9.7 04/10/2025 04:07 AM    RBC 3.34 04/10/2025 04:07 AM    HGB 10.7 04/10/2025 04:07 AM    HCT 32.5 04/10/2025 04:07 AM    MCV 97.3 04/10/2025 04:07 AM    MCH 32.0 04/10/2025 04:07 AM    MCHC 32.9 04/10/2025 04:07 AM    RDW 12.9 04/10/2025 04:07 AM     04/10/2025 04:07 AM    MPV 11.2 04/10/2025 04:07 AM     Hemoglobin/Hematocrit:    Lab Results   Component Value Date/Time    HGB 10.7 04/10/2025 04:07 AM    HCT 32.5 04/10/2025 04:07 AM       Current Inpatient Medications    Current Facility-Administered Medications: lactated ringers infusion, , IntraVENous, Continuous  lactobacillus (CULTURELLE) capsule 1 capsule, 1 capsule, Oral, Daily with breakfast  HYDROmorphone (DILAUDID) injection 0.5 mg, 0.5 mg, IntraVENous, Q4H PRN  acetaminophen (TYLENOL) tablet 1,000 mg, 1,000 mg, Oral, 3 times per day  oxyCODONE (ROXICODONE) immediate release tablet 5 mg,

## 2025-04-10 NOTE — PROGRESS NOTES
[facility]  [address] [place] [address] [phone]      Hospitalist Progress Note      NAME: Deepti Ayala   :  1975  MRM:  [id number]    Date of service: 4/10/2025  9:00 AM       Assessment and Plan:   Acute abdominal pain/Perforated colon S/P Exploratory laparotomy, lysis of adhesions, resection of prior colonic anastomosis with side-to-side functional end-to-end colocolostomy on .  Pain control with IV Dilaudid and Toradol.  Continue Zosyn empirically.  General surgery is following(Dr Josse Ni).  On full liquid diet.  Out of bed, ambulate.      2.  Diarrhea.  Patient had episodes of loose and watery diarrhea.  Possibly due to antibiotics.  Started on lactobacillus.  Check stool for Enterobacter     3.  Hx divericulitis S/p sigmoid colectomy in .  Monitor     4.  ALEKSANDER.  Continue home fluoxetine    5.  Hypokalemia.  Replete            Subjective:     Chief Complaint:: Patient was seen and examined as a follow up for perforated colon.  Chart was reviewed.  C/O still has loose stool and mild abdominal pain    ROS:  (bold if positive, if negative)    Diarrhea  Tolerating PT  Tolerating Diet     v   Objective:     Last 24hrs VS reviewed since prior progress note. Most recent are:    Vitals:    04/10/25 0400   BP: 119/71   Pulse: 63   Resp: 18   Temp: 98.2 °F (36.8 °C)   SpO2: 96%     SpO2 Readings from Last 6 Encounters:   04/10/25 96%   10/14/24 99%   24 96%   24 95%          Intake/Output Summary (Last 24 hours) at 4/10/2025 0900  Last data filed at 4/10/2025 0508  Gross per 24 hour   Intake --   Output 7 ml   Net -7 ml        Physical Exam:    Gen:  Well-developed, well-nourished, in no acute distress  HEENT:  Pink conjunctivae, PERRL, hearing intact to voice, moist mucous membranes  Neck:  Supple, without masses, thyroid non-tender  Resp:  No accessory muscle use, clear breath sounds without wheezes rales or rhonchi  Card:  No  w/Family           ___________________________________________________    Attending Physician: Alek Maldonado MD

## 2025-04-11 LAB
ANION GAP SERPL CALC-SCNC: 8 MMOL/L (ref 2–12)
BUN SERPL-MCNC: 17 MG/DL (ref 6–20)
BUN/CREAT SERPL: 33 (ref 12–20)
CALCIUM SERPL-MCNC: 8 MG/DL (ref 8.5–10.1)
CHLORIDE SERPL-SCNC: 110 MMOL/L (ref 97–108)
CO2 SERPL-SCNC: 21 MMOL/L (ref 21–32)
CREAT SERPL-MCNC: 0.51 MG/DL (ref 0.55–1.02)
GLUCOSE SERPL-MCNC: 77 MG/DL (ref 65–100)
MAGNESIUM SERPL-MCNC: 1.6 MG/DL (ref 1.6–2.4)
PHOSPHATE SERPL-MCNC: 3.1 MG/DL (ref 2.6–4.7)
POTASSIUM SERPL-SCNC: 3.7 MMOL/L (ref 3.5–5.1)
SODIUM SERPL-SCNC: 139 MMOL/L (ref 136–145)

## 2025-04-11 PROCEDURE — 6370000000 HC RX 637 (ALT 250 FOR IP)

## 2025-04-11 PROCEDURE — 6360000002 HC RX W HCPCS: Performed by: SURGERY

## 2025-04-11 PROCEDURE — 6370000000 HC RX 637 (ALT 250 FOR IP): Performed by: INTERNAL MEDICINE

## 2025-04-11 PROCEDURE — 94761 N-INVAS EAR/PLS OXIMETRY MLT: CPT

## 2025-04-11 PROCEDURE — 2580000003 HC RX 258: Performed by: SURGERY

## 2025-04-11 PROCEDURE — 05HB33Z INSERTION OF INFUSION DEVICE INTO RIGHT BASILIC VEIN, PERCUTANEOUS APPROACH: ICD-10-PCS | Performed by: STUDENT IN AN ORGANIZED HEALTH CARE EDUCATION/TRAINING PROGRAM

## 2025-04-11 PROCEDURE — 2500000003 HC RX 250 WO HCPCS: Performed by: INTERNAL MEDICINE

## 2025-04-11 PROCEDURE — 83735 ASSAY OF MAGNESIUM: CPT

## 2025-04-11 PROCEDURE — 80048 BASIC METABOLIC PNL TOTAL CA: CPT

## 2025-04-11 PROCEDURE — 2580000003 HC RX 258: Performed by: INTERNAL MEDICINE

## 2025-04-11 PROCEDURE — 84100 ASSAY OF PHOSPHORUS: CPT

## 2025-04-11 PROCEDURE — 1100000000 HC RM PRIVATE

## 2025-04-11 PROCEDURE — 6370000000 HC RX 637 (ALT 250 FOR IP): Performed by: STUDENT IN AN ORGANIZED HEALTH CARE EDUCATION/TRAINING PROGRAM

## 2025-04-11 PROCEDURE — 6360000002 HC RX W HCPCS: Performed by: INTERNAL MEDICINE

## 2025-04-11 RX ADMIN — SODIUM CHLORIDE, SODIUM LACTATE, POTASSIUM CHLORIDE, AND CALCIUM CHLORIDE: .6; .31; .03; .02 INJECTION, SOLUTION INTRAVENOUS at 01:17

## 2025-04-11 RX ADMIN — ACETAMINOPHEN 1000 MG: 500 TABLET ORAL at 13:49

## 2025-04-11 RX ADMIN — PIPERACILLIN AND TAZOBACTAM 3375 MG: 3; .375 INJECTION, POWDER, LYOPHILIZED, FOR SOLUTION INTRAVENOUS at 09:13

## 2025-04-11 RX ADMIN — ONDANSETRON 4 MG: 2 INJECTION, SOLUTION INTRAMUSCULAR; INTRAVENOUS at 11:34

## 2025-04-11 RX ADMIN — OXYCODONE 10 MG: 5 TABLET ORAL at 16:08

## 2025-04-11 RX ADMIN — OXYCODONE 5 MG: 5 TABLET ORAL at 05:37

## 2025-04-11 RX ADMIN — ACETAMINOPHEN 1000 MG: 500 TABLET ORAL at 05:37

## 2025-04-11 RX ADMIN — ENOXAPARIN SODIUM 40 MG: 100 INJECTION SUBCUTANEOUS at 09:13

## 2025-04-11 RX ADMIN — OXYCODONE 10 MG: 5 TABLET ORAL at 10:18

## 2025-04-11 RX ADMIN — PIPERACILLIN AND TAZOBACTAM 3375 MG: 3; .375 INJECTION, POWDER, LYOPHILIZED, FOR SOLUTION INTRAVENOUS at 01:13

## 2025-04-11 RX ADMIN — PIPERACILLIN AND TAZOBACTAM 3375 MG: 3; .375 INJECTION, POWDER, LYOPHILIZED, FOR SOLUTION INTRAVENOUS at 21:07

## 2025-04-11 RX ADMIN — TOPIRAMATE 100 MG: 100 TABLET, FILM COATED ORAL at 09:13

## 2025-04-11 RX ADMIN — Medication 1 CAPSULE: at 09:13

## 2025-04-11 RX ADMIN — OXYCODONE 10 MG: 5 TABLET ORAL at 21:04

## 2025-04-11 RX ADMIN — SODIUM CHLORIDE, PRESERVATIVE FREE 10 ML: 5 INJECTION INTRAVENOUS at 09:16

## 2025-04-11 RX ADMIN — SODIUM CHLORIDE, PRESERVATIVE FREE 10 ML: 5 INJECTION INTRAVENOUS at 21:30

## 2025-04-11 RX ADMIN — FLUOXETINE HYDROCHLORIDE 20 MG: 20 CAPSULE ORAL at 09:13

## 2025-04-11 RX ADMIN — ACETAMINOPHEN 1000 MG: 500 TABLET ORAL at 21:04

## 2025-04-11 ASSESSMENT — PAIN SCALES - GENERAL
PAINLEVEL_OUTOF10: 2
PAINLEVEL_OUTOF10: 2
PAINLEVEL_OUTOF10: 5
PAINLEVEL_OUTOF10: 7
PAINLEVEL_OUTOF10: 7
PAINLEVEL_OUTOF10: 5
PAINLEVEL_OUTOF10: 3

## 2025-04-11 ASSESSMENT — PAIN DESCRIPTION - ORIENTATION
ORIENTATION: MID
ORIENTATION: RIGHT

## 2025-04-11 ASSESSMENT — PAIN DESCRIPTION - PAIN TYPE
TYPE: ACUTE PAIN;SURGICAL PAIN
TYPE: ACUTE PAIN
TYPE: ACUTE PAIN

## 2025-04-11 ASSESSMENT — PAIN DESCRIPTION - LOCATION
LOCATION: ABDOMEN

## 2025-04-11 ASSESSMENT — PAIN DESCRIPTION - FREQUENCY
FREQUENCY: INTERMITTENT
FREQUENCY: CONTINUOUS
FREQUENCY: INTERMITTENT

## 2025-04-11 ASSESSMENT — PAIN - FUNCTIONAL ASSESSMENT
PAIN_FUNCTIONAL_ASSESSMENT: PREVENTS OR INTERFERES WITH MANY ACTIVE NOT PASSIVE ACTIVITIES
PAIN_FUNCTIONAL_ASSESSMENT: PREVENTS OR INTERFERES SOME ACTIVE ACTIVITIES AND ADLS

## 2025-04-11 ASSESSMENT — PAIN DESCRIPTION - DESCRIPTORS
DESCRIPTORS: SHARP;STABBING
DESCRIPTORS: CRAMPING
DESCRIPTORS: SORE
DESCRIPTORS: ACHING;CRAMPING

## 2025-04-11 NOTE — PROGRESS NOTES
Ultrasound IV by  :  Procedure Note    Ultrasound IV education provided to patient. Opportunities for questions given.     Ultrasound used for PIV placement:  20 gauge 5.7 cm AccuCath Ace 5.7cm  right basilic} location.  1 X Attempt(s).    Vigorous blood return present and saline flushes with ease.     Procedure tolerated well. Primary RN aware of IV placement and added to LDA.      Cristina Clemens, RN

## 2025-04-11 NOTE — PROGRESS NOTES
Department of General Surgery - Adult  Surgical Service [facility]  Attending Progress Note      SUBJECTIVE:  C/O continued crampy abdominal pain    OBJECTIVE      Physical    VITALS:  /75   Pulse 63   Temp 98.1 °F (36.7 °C) (Oral)   Resp 16   Ht 1.6 m (5' 3\")   Wt 65.4 kg (144 lb 2.9 oz)   SpO2 96%   BMI 25.54 kg/m²   INTAKE/OUTPUT:    Intake/Output Summary (Last 24 hours) at 4/11/2025 0814  Last data filed at 4/10/2025 2200  Gross per 24 hour   Intake --   Output 0 ml   Net 0 ml     ABDOMEN:  soft, ND, NT, Drain thin & murky    Data  CBC:   Lab Results   Component Value Date/Time    WBC 9.7 04/10/2025 04:07 AM    RBC 3.34 04/10/2025 04:07 AM    HGB 10.7 04/10/2025 04:07 AM    HCT 32.5 04/10/2025 04:07 AM    MCV 97.3 04/10/2025 04:07 AM    MCH 32.0 04/10/2025 04:07 AM    MCHC 32.9 04/10/2025 04:07 AM    RDW 12.9 04/10/2025 04:07 AM     04/10/2025 04:07 AM    MPV 11.2 04/10/2025 04:07 AM     BMP:    Lab Results   Component Value Date/Time     04/11/2025 04:34 AM    K 3.7 04/11/2025 04:34 AM     04/11/2025 04:34 AM    CO2 21 04/11/2025 04:34 AM    BUN 17 04/11/2025 04:34 AM    CREATININE 0.51 04/11/2025 04:34 AM    CALCIUM 8.0 04/11/2025 04:34 AM    LABGLOM >90 04/11/2025 04:34 AM    LABGLOM >60 02/09/2024 02:51 AM    GLUCOSE 77 04/11/2025 04:34 AM       Current Inpatient Medications    Current Facility-Administered Medications: lactobacillus (CULTURELLE) capsule 1 capsule, 1 capsule, Oral, Daily with breakfast  HYDROmorphone (DILAUDID) injection 0.5 mg, 0.5 mg, IntraVENous, Q4H PRN  acetaminophen (TYLENOL) tablet 1,000 mg, 1,000 mg, Oral, 3 times per day  oxyCODONE (ROXICODONE) immediate release tablet 5 mg, 5 mg, Oral, Q4H PRN **OR** oxyCODONE (ROXICODONE) immediate release tablet 10 mg, 10 mg, Oral, Q4H PRN  diazePAM (VALIUM) injection 10 mg, 10 mg, IntraVENous, Q4H PRN  topiramate (TOPAMAX) tablet 100 mg, 100 mg, Oral, Daily  FLUoxetine (PROZAC) capsule 20 mg, 20 mg, Oral,  Daily  dicyclomine (BENTYL) tablet 20 mg, 20 mg, Oral, TID PRN  Lumateperone Tosylate (CAPLYTA) capsule 42 mg (Patient Supplied), 42 mg, Oral, Daily  piperacillin-tazobactam (ZOSYN) 3,375 mg in sodium chloride 0.9 % 50 mL IVPB (addEASE), 3,375 mg, IntraVENous, Q8H  morphine (PF) injection 2 mg, 2 mg, IntraVENous, Q4H PRN  naloxone (NARCAN) injection 0.4 mg, 0.4 mg, IntraVENous, PRN  sodium chloride flush 0.9 % injection 5-40 mL, 5-40 mL, IntraVENous, 2 times per day  sodium chloride flush 0.9 % injection 5-40 mL, 5-40 mL, IntraVENous, PRN  0.9 % sodium chloride infusion, , IntraVENous, PRN  potassium chloride (KLOR-CON) extended release tablet 40 mEq, 40 mEq, Oral, PRN **OR** potassium bicarb-citric acid (EFFER-K) effervescent tablet 40 mEq, 40 mEq, Oral, PRN **OR** potassium chloride 10 mEq/100 mL IVPB (Peripheral Line), 10 mEq, IntraVENous, PRN  magnesium sulfate 2000 mg in 50 mL IVPB premix, 2,000 mg, IntraVENous, PRN  ondansetron (ZOFRAN-ODT) disintegrating tablet 4 mg, 4 mg, Oral, Q8H PRN **OR** ondansetron (ZOFRAN) injection 4 mg, 4 mg, IntraVENous, Q6H PRN  acetaminophen (TYLENOL) tablet 650 mg, 650 mg, Oral, Q6H PRN **OR** acetaminophen (TYLENOL) suppository 650 mg, 650 mg, Rectal, Q6H PRN  polyethylene glycol (GLYCOLAX) packet 17 g, 17 g, Oral, Daily PRN  enoxaparin (LOVENOX) injection 40 mg, 40 mg, SubCUTAneous, Daily    ASSESSMENT AND PLAN    Cont Abx  Work on pain control  Hopefully home in AM

## 2025-04-11 NOTE — PLAN OF CARE
Pt A&Ox4, calm and cooperative. Pt's surgical site and MADIHA drain CDI. MADIHA drain had 40mL output by end of shift. While rounding on pt, pt states her arm hurts where her IV is infusing her antibiotic, 45 minutes after start of infusion. Paused infusion and assessed left arm- edematous and pt states painful to palpation. Called pharmacy to verify management of extravasation of IV Zosyn. Per pharmacist's instructions: do not restart Zosyn, aspirate IV and remove, elevate affected arm, and apply cool compress. Followed instructions. Attempted to place new PIV access in right arm with no success. Called IR RN who placed new IV in right arm. Restarted remaining IV Zosyn per pharmacist's instructions. Pt's mother visited pt today and went on a couple of walks in the hallway together. Administered PRN oxycodone and PRN zofran prophylactically.     Problem: Discharge Planning  Goal: Discharge to home or other facility with appropriate resources  Outcome: Progressing     Problem: Pain  Goal: Verbalizes/displays adequate comfort level or baseline comfort level  Outcome: Progressing     Problem: Safety - Adult  Goal: Free from fall injury  Outcome: Progressing     Problem: Skin/Tissue Integrity  Goal: Skin integrity remains intact  Description: 1.  Monitor for areas of redness and/or skin breakdown  2.  Assess vascular access sites hourly  3.  Every 4-6 hours minimum:  Change oxygen saturation probe site  4.  Every 4-6 hours:  If on nasal continuous positive airway pressure, respiratory therapy assess nares and determine need for appliance change or resting period  Outcome: Progressing     Problem: Nutrition Deficit:  Goal: Optimize nutritional status  Outcome: Progressing

## 2025-04-11 NOTE — PLAN OF CARE
Pt A&Ox4, calm and cooperative. Surgical site CDI. Pt complains of abdominal pain- administered PRN oxycodone with PRN zofran. Pt's mother visited pt today.      Problem: Discharge Planning  Goal: Discharge to home or other facility with appropriate resources  Outcome: Progressing     Problem: Pain  Goal: Verbalizes/displays adequate comfort level or baseline comfort level  Outcome: Progressing     Problem: Safety - Adult  Goal: Free from fall injury  Outcome: Progressing     Problem: Skin/Tissue Integrity  Goal: Skin integrity remains intact  Description: 1.  Monitor for areas of redness and/or skin breakdown  2.  Assess vascular access sites hourly  3.  Every 4-6 hours minimum:  Change oxygen saturation probe site  4.  Every 4-6 hours:  If on nasal continuous positive airway pressure, respiratory therapy assess nares and determine need for appliance change or resting period  Outcome: Progressing

## 2025-04-11 NOTE — PROGRESS NOTES
Comprehensive Nutrition Assessment    Type and Reason for Visit: LOS    Nutrition Recommendations/Plan:   Full liquid diet currently - advance to GI bland as tolerated  Added Ensure PLUS BID and Banatrol BID to help meet kcal/pro needs on Full liquid diet and help with diarrhea       Malnutrition Assessment:  Malnutrition Status:  At risk for malnutrition (04/11/25 1209)    Context:  Acute Illness     Findings of the 6 clinical characteristics of malnutrition:  Energy Intake:  75% or less of estimated energy requirements for 7 or more days  Weight Loss:  Mild weight loss     Body Fat Loss:  No body fat loss     Muscle Mass Loss:  No muscle mass loss    Fluid Accumulation:  No fluid accumulation     Strength:  Not Performed       Nutrition Assessment:    Past medical hx:       Diagnosis Date    Bipolar 1 disorder (HCC)     Diverticulitis of intestine with perforation 09/2024    Suicide attempt by drug overdose ([facility]) 02/06/2024     Pt screened for LOS. Pt is POD 5, \"ex lap, lysis of adhesions, resection of prior colonic anastomosis.\" Pt continues on Full liquid diet. Added Banatrol BID for diarrhea and Ensure PLUS to help meet kcal/pro needs s/p surgery. Pt with some abdominal pain and mild nausea and diarrhea. Diarrhea possibly from abx. Monitor Gi status, weight and please document PO intake in flowsheet. No known food allergies. No chew/swallow difficulties. No reported BM today at time of writing.       Current Nutrition Therapies:  ADULT DIET; Full Liquid  ADULT ORAL NUTRITION SUPPLEMENT; Lunch, Dinner; Other Oral Supplement; Banatrol  ADULT ORAL NUTRITION SUPPLEMENT; AM Snack, PM Snack; Standard High Calorie/High Protein Oral Supplement  Meal Intake:   No data found.  Supplement Intake:  No data found.  Nutrition Support: n/a    Nutritionally Significant Medications:  Scheduled Meds:   lactobacillus  1 capsule Oral Daily with breakfast    acetaminophen  1,000 mg Oral 3 times per day    topiramate  100 mg  this time  Coordination of Nutrition Care: Continue to monitor while inpatient, Interdisciplinary Rounds       Goals:     Goals: PO intake 50% or greater, by next RD assessment       Nutrition Monitoring and Evaluation:      Food/Nutrient Intake Outcomes: Food and Nutrient Intake, Supplement Intake, Diet Advancement/Tolerance  Physical Signs/Symptoms Outcomes: Biochemical Data, Weight, Skin, GI Status, Diarrhea    Discharge Planning:    Continue current diet, Continue Oral Nutrition Supplement     Sunday Lindo RD  Available via [employer] # [phone]

## 2025-04-12 VITALS
SYSTOLIC BLOOD PRESSURE: 110 MMHG | WEIGHT: 144.18 LBS | OXYGEN SATURATION: 94 % | HEART RATE: 67 BPM | RESPIRATION RATE: 17 BRPM | DIASTOLIC BLOOD PRESSURE: 65 MMHG | TEMPERATURE: 97.5 F | HEIGHT: 63 IN | BODY MASS INDEX: 25.55 KG/M2

## 2025-04-12 PROBLEM — R10.9 INTRACTABLE ABDOMINAL PAIN: Status: RESOLVED | Noted: 2025-04-05 | Resolved: 2025-04-12

## 2025-04-12 LAB
ANION GAP SERPL CALC-SCNC: 9 MMOL/L (ref 2–12)
BASOPHILS # BLD: 0.01 K/UL (ref 0–0.1)
BASOPHILS NFR BLD: 0.2 % (ref 0–1)
BUN SERPL-MCNC: 10 MG/DL (ref 6–20)
BUN/CREAT SERPL: 19 (ref 12–20)
CALCIUM SERPL-MCNC: 8.1 MG/DL (ref 8.5–10.1)
CHLORIDE SERPL-SCNC: 107 MMOL/L (ref 97–108)
CO2 SERPL-SCNC: 24 MMOL/L (ref 21–32)
CREAT SERPL-MCNC: 0.54 MG/DL (ref 0.55–1.02)
DIFFERENTIAL METHOD BLD: ABNORMAL
EOSINOPHIL # BLD: 0.08 K/UL (ref 0–0.4)
EOSINOPHIL NFR BLD: 1.4 % (ref 0–7)
ERYTHROCYTE [DISTWIDTH] IN BLOOD BY AUTOMATED COUNT: 12.5 % (ref 11.5–14.5)
GLUCOSE SERPL-MCNC: 89 MG/DL (ref 65–100)
HCT VFR BLD AUTO: 31.5 % (ref 35–47)
HGB BLD-MCNC: 10.5 G/DL (ref 11.5–16)
IMM GRANULOCYTES # BLD AUTO: 0.05 K/UL (ref 0–0.04)
IMM GRANULOCYTES NFR BLD AUTO: 0.9 % (ref 0–0.5)
LYMPHOCYTES # BLD: 1.28 K/UL (ref 0.8–3.5)
LYMPHOCYTES NFR BLD: 21.8 % (ref 12–49)
MCH RBC QN AUTO: 32.1 PG (ref 26–34)
MCHC RBC AUTO-ENTMCNC: 33.3 G/DL (ref 30–36.5)
MCV RBC AUTO: 96.3 FL (ref 80–99)
MONOCYTES # BLD: 0.69 K/UL (ref 0–1)
MONOCYTES NFR BLD: 11.7 % (ref 5–13)
NEUTS SEG # BLD: 3.77 K/UL (ref 1.8–8)
NEUTS SEG NFR BLD: 64 % (ref 32–75)
NRBC # BLD: 0 K/UL (ref 0–0.01)
NRBC BLD-RTO: 0 PER 100 WBC
PLATELET # BLD AUTO: 231 K/UL (ref 150–400)
PMV BLD AUTO: 10.7 FL (ref 8.9–12.9)
POTASSIUM SERPL-SCNC: 3.7 MMOL/L (ref 3.5–5.1)
RBC # BLD AUTO: 3.27 M/UL (ref 3.8–5.2)
SODIUM SERPL-SCNC: 140 MMOL/L (ref 136–145)
WBC # BLD AUTO: 5.9 K/UL (ref 3.6–11)

## 2025-04-12 PROCEDURE — 80048 BASIC METABOLIC PNL TOTAL CA: CPT

## 2025-04-12 PROCEDURE — 6370000000 HC RX 637 (ALT 250 FOR IP): Performed by: INTERNAL MEDICINE

## 2025-04-12 PROCEDURE — 6370000000 HC RX 637 (ALT 250 FOR IP)

## 2025-04-12 PROCEDURE — 6360000002 HC RX W HCPCS: Performed by: SURGERY

## 2025-04-12 PROCEDURE — 6360000002 HC RX W HCPCS: Performed by: INTERNAL MEDICINE

## 2025-04-12 PROCEDURE — 2580000003 HC RX 258: Performed by: SURGERY

## 2025-04-12 PROCEDURE — 85025 COMPLETE CBC W/AUTO DIFF WBC: CPT

## 2025-04-12 PROCEDURE — 6370000000 HC RX 637 (ALT 250 FOR IP): Performed by: STUDENT IN AN ORGANIZED HEALTH CARE EDUCATION/TRAINING PROGRAM

## 2025-04-12 PROCEDURE — 36415 COLL VENOUS BLD VENIPUNCTURE: CPT

## 2025-04-12 RX ORDER — OXYCODONE HYDROCHLORIDE 5 MG/1
5 TABLET ORAL EVERY 4 HOURS PRN
Qty: 9 TABLET | Refills: 0 | Status: SHIPPED | OUTPATIENT
Start: 2025-04-12 | End: 2025-04-15

## 2025-04-12 RX ADMIN — PIPERACILLIN AND TAZOBACTAM 3375 MG: 3; .375 INJECTION, POWDER, LYOPHILIZED, FOR SOLUTION INTRAVENOUS at 06:06

## 2025-04-12 RX ADMIN — ENOXAPARIN SODIUM 40 MG: 100 INJECTION SUBCUTANEOUS at 08:33

## 2025-04-12 RX ADMIN — ACETAMINOPHEN 1000 MG: 500 TABLET ORAL at 06:00

## 2025-04-12 RX ADMIN — FLUOXETINE HYDROCHLORIDE 20 MG: 20 CAPSULE ORAL at 08:33

## 2025-04-12 RX ADMIN — OXYCODONE 5 MG: 5 TABLET ORAL at 06:00

## 2025-04-12 RX ADMIN — OXYCODONE 10 MG: 5 TABLET ORAL at 14:29

## 2025-04-12 RX ADMIN — TOPIRAMATE 100 MG: 100 TABLET, FILM COATED ORAL at 08:33

## 2025-04-12 RX ADMIN — Medication 1 CAPSULE: at 08:33

## 2025-04-12 ASSESSMENT — PAIN SCALES - GENERAL
PAINLEVEL_OUTOF10: 0
PAINLEVEL_OUTOF10: 8
PAINLEVEL_OUTOF10: 1
PAINLEVEL_OUTOF10: 5

## 2025-04-12 ASSESSMENT — PAIN DESCRIPTION - DESCRIPTORS: DESCRIPTORS: CRAMPING;SHOOTING

## 2025-04-12 ASSESSMENT — PAIN DESCRIPTION - LOCATION
LOCATION: ABDOMEN
LOCATION: ABDOMEN

## 2025-04-12 ASSESSMENT — PAIN DESCRIPTION - ORIENTATION: ORIENTATION: MID

## 2025-04-12 NOTE — PROGRESS NOTES
Nurse handed patient a copy of discharge instructions which have been read and explained to patient. New medications were read and explained to patient, patient verbalized understanding. Patient aware that prescriptions have been electronically sent to their pharmacy. Opportunity for questions and clarification offered. Removed patient's IV access with no complications. Vital signs stable. Patient sent with all belongings and home meds.

## 2025-04-12 NOTE — PROGRESS NOTES
Department of General Surgery - Adult  Surgical Service Progress Note      SUBJECTIVE:  doing well, no c/o    OBJECTIVE      Physical    VITALS:  /77   Pulse 68   Temp 98.2 °F (36.8 °C) (Oral)   Resp 17   Ht 1.6 m (5' 2.99\")   Wt 65.4 kg (144 lb 2.9 oz)   SpO2 95%   BMI 25.55 kg/m²   INTAKE/OUTPUT:    Intake/Output Summary (Last 24 hours) at 4/12/2025 0859  Last data filed at 4/12/2025 0608  Gross per 24 hour   Intake --   Output 70 ml   Net -70 ml     ABDOMEN:  soft, non-distended, and non-tender    Data  CBC:   Lab Results   Component Value Date/Time    WBC 5.9 04/12/2025 03:06 AM    RBC 3.27 04/12/2025 03:06 AM    HGB 10.5 04/12/2025 03:06 AM    HCT 31.5 04/12/2025 03:06 AM    MCV 96.3 04/12/2025 03:06 AM    MCH 32.1 04/12/2025 03:06 AM    MCHC 33.3 04/12/2025 03:06 AM    RDW 12.5 04/12/2025 03:06 AM     04/12/2025 03:06 AM    MPV 10.7 04/12/2025 03:06 AM     CMP:    Lab Results   Component Value Date/Time     04/12/2025 03:06 AM    K 3.7 04/12/2025 03:06 AM     04/12/2025 03:06 AM    CO2 24 04/12/2025 03:06 AM    BUN 10 04/12/2025 03:06 AM    CREATININE 0.54 04/12/2025 03:06 AM    LABGLOM >90 04/12/2025 03:06 AM    LABGLOM >60 02/09/2024 02:51 AM    GLUCOSE 89 04/12/2025 03:06 AM    CALCIUM 8.1 04/12/2025 03:06 AM    BILITOT 0.3 04/05/2025 08:27 PM    ALKPHOS 68 04/05/2025 08:27 PM    AST 12 04/05/2025 08:27 PM    ALT 16 04/05/2025 08:27 PM       Current Inpatient Medications    Current Facility-Administered Medications: lactobacillus (CULTURELLE) capsule 1 capsule, 1 capsule, Oral, Daily with breakfast  HYDROmorphone (DILAUDID) injection 0.5 mg, 0.5 mg, IntraVENous, Q4H PRN  acetaminophen (TYLENOL) tablet 1,000 mg, 1,000 mg, Oral, 3 times per day  oxyCODONE (ROXICODONE) immediate release tablet 5 mg, 5 mg, Oral, Q4H PRN **OR** oxyCODONE (ROXICODONE) immediate release tablet 10 mg, 10 mg, Oral, Q4H PRN  diazePAM (VALIUM) injection 10 mg, 10 mg, IntraVENous, Q4H PRN  topiramate

## 2025-04-12 NOTE — PROGRESS NOTES
[facility]  [address] [place] [address] [phone]      Hospitalist Progress Note      NAME: Deepti Ayala   :  1975  MRM:  [id number]    Date of service: 2025  10:08 AM       Assessment and Plan:   Acute abdominal pain/Perforated colon S/P Exploratory laparotomy, lysis of adhesions, resection of prior colonic anastomosis with side-to-side functional end-to-end colocolostomy on .   Pain control with IV Dilaudid and Toradol.    Continue Zosyn empirically.    General surgery is following(Dr Josse Ni).    On full liquid diet, pending surgery recommendation to advance diet   Out of bed, ambulate.        2.  Diarrhea/ chronic since last surgery    Patient had episodes of loose and watery diarrhea.    Cont lactobacillus.   Stool for Enterobacter bacter is negative. No fever     3.  Hx divericulitis S/p sigmoid colectomy in .    Monitor     4.  ALEKSANDER.    Continue home fluoxetine    5.  Hypokalemia.    Replete            Subjective:     Chief Complaint:: Patient was seen and examined as a follow up for perforated colon.  Chart was reviewed.  no complains today     ROS:  (bold if positive, if negative)    Diarrhea  Tolerating PT  Tolerating Diet     v   Objective:     Last 24hrs VS reviewed since prior progress note. Most recent are:    Vitals:    25 0805   BP: 128/77   Pulse: 68   Resp: 17   Temp: 98.2 °F (36.8 °C)   SpO2: 95%     SpO2 Readings from Last 6 Encounters:   25 95%   10/14/24 99%   24 96%   24 95%          Intake/Output Summary (Last 24 hours) at 2025 1008  Last data filed at 2025 0608  Gross per 24 hour   Intake --   Output 70 ml   Net -70 ml        Physical Exam:    Gen:  Well-developed, well-nourished, in no acute distress  HEENT:  Pink conjunctivae, PERRL, hearing intact to voice, moist mucous membranes  Neck:  Supple, without masses, thyroid non-tender  Resp:  No accessory muscle use, clear  breath sounds without wheezes rales or rhonchi  Card:  No murmurs, normal S1, S2 without thrills, bruits or peripheral edema  Abd:  Soft, non-tender, non-distended, normoactive bowel sounds are present, no palpable organomegaly and no detectable hernias, drain in site with minimal drainage   Lymph:  No cervical or inguinal adenopathy  Musc:  No cyanosis or clubbing  Skin:  No rashes or ulcers, skin turgor is good  Neuro:  Cranial nerves are grossly intact, no focal motor weakness, follows commands appropriately  Psych:  Good insight, oriented to person, place and time, alert  __________________________________________________________________  Medications Reviewed: (see below)  Medications:     Current Facility-Administered Medications   Medication Dose Route Frequency    lactobacillus (CULTURELLE) capsule 1 capsule  1 capsule Oral Daily with breakfast    HYDROmorphone (DILAUDID) injection 0.5 mg  0.5 mg IntraVENous Q4H PRN    acetaminophen (TYLENOL) tablet 1,000 mg  1,000 mg Oral 3 times per day    oxyCODONE (ROXICODONE) immediate release tablet 5 mg  5 mg Oral Q4H PRN    Or    oxyCODONE (ROXICODONE) immediate release tablet 10 mg  10 mg Oral Q4H PRN    diazePAM (VALIUM) injection 10 mg  10 mg IntraVENous Q4H PRN    topiramate (TOPAMAX) tablet 100 mg  100 mg Oral Daily    FLUoxetine (PROZAC) capsule 20 mg  20 mg Oral Daily    dicyclomine (BENTYL) tablet 20 mg  20 mg Oral TID PRN    Lumateperone Tosylate (CAPLYTA) capsule 42 mg (Patient Supplied)  42 mg Oral Daily    piperacillin-tazobactam (ZOSYN) 3,375 mg in sodium chloride 0.9 % 50 mL IVPB (addEASE)  3,375 mg IntraVENous Q8H    morphine (PF) injection 2 mg  2 mg IntraVENous Q4H PRN    naloxone (NARCAN) injection 0.4 mg  0.4 mg IntraVENous PRN    sodium chloride flush 0.9 % injection 5-40 mL  5-40 mL IntraVENous 2 times per day    sodium chloride flush 0.9 % injection 5-40 mL  5-40 mL IntraVENous PRN    0.9 % sodium chloride infusion   IntraVENous PRN    potassium

## 2025-04-12 NOTE — DISCHARGE SUMMARY
Hospitalist Discharge Summary     Patient ID:  Deepti Ayala  [id number]  49 y.o.  1975    Admit date: 4/5/2025    Discharge date and time: 4/12/2025    Admission Diagnoses: Intractable abdominal pain [R10.9]    Discharge Diagnoses:    Principal Problem (Resolved):    Intractable abdominal pain  Active Problems:    * No active hospital problems. *         Hospital Course:     Acute abdominal pain/Perforated colon S/P Exploratory laparotomy, lysis of adhesions, resection of prior colonic anastomosis with side-to-side functional end-to-end colocolostomy on 04/06.   plan  Pain control with IV Dilaudid and Toradol, prn oxy on d/c   Received  Zosyn empirically, no abx needed on d/c   General surgery is following(Dr Josse Ni).    Advanced diet to regular prior to d/c   Out of bed, ambulate.      2.  Diarrhea/ chronic since last surgery    Patient had episodes of loose and watery diarrhea.    Cont lactobacillus.   Stool for Enterobacter bacter is negative. No fever     3.  Hx divericulitis S/p sigmoid colectomy in 2024.    Monitor     4.  ALEKSANDER.    Continue home fluoxetine     5.  Hypokalemia.    Replete       Imaging  XR ABDOMEN (KUB) (SINGLE AP VIEW)  Result Date: 4/6/2025  Gastric tube tip projects at the GE junction. Advancement is recommended. Electronically signed by GA NUÑEZ    CTA ABDOMEN PELVIS W CONTRAST  Result Date: 4/6/2025  Moderate volume free air. Wall thickening of proximal small bowel, could represent bowel ischemia. Findings were communicated with Dr. Ugarte at 9:16 a.m. via [employer] Electronically signed by BHAVNA HARMAN    CT ABDOMEN PELVIS W IV CONTRAST Additional Contrast? None  Result Date: 4/5/2025  Small volume of ascites. Cause is not identified. Electronically signed by Vini Santillan       PCP: Ally Sears MD     Consults: general surgery    Condition of patient at discharge: Stable    Discharge Exam:    Physical Exam:    Gen: Well-developed, well-nourished, in no  words are mis-transcribed.

## 2025-04-12 NOTE — PLAN OF CARE
Problem: Pain  Goal: Verbalizes/displays adequate comfort level or baseline comfort level  4/11/2025 2208 by Jeri Hill, RN  Outcome: Progressing  4/11/2025 1512 by Lis Iglesias, RN  Outcome: Progressing     Problem: Safety - Adult  Goal: Free from fall injury  4/11/2025 2208 by Jeri Hill, RN  Outcome: Progressing  4/11/2025 1512 by Lis Iglesias, RN  Outcome: Progressing

## 2025-04-12 NOTE — PLAN OF CARE
Problem: Discharge Planning  Goal: Discharge to home or other facility with appropriate resources  4/11/2025 2208 by Jeri Hill, RN  Outcome: Progressing     Problem: Pain  Goal: Verbalizes/displays adequate comfort level or baseline comfort level  4/11/2025 2208 by Jeri Hill, RN  Outcome: Progressing     Problem: Safety - Adult  Goal: Free from fall injury  4/11/2025 2208 by Jeri Hill, RN  Outcome: Progressing     Problem: Skin/Tissue Integrity  Goal: Skin integrity remains intact  Description: 1.  Monitor for areas of redness and/or skin breakdown  2.  Assess vascular access sites hourly  3.  Every 4-6 hours minimum:  Change oxygen saturation probe site  4.  Every 4-6 hours:  If on nasal continuous positive airway pressure, respiratory therapy assess nares and determine need for appliance change or resting period  4/11/2025 2208 by Jeri Hill, RN  Outcome: Progressing     Problem: Nutrition Deficit:  Goal: Optimize nutritional status  4/11/2025 2208 by Jeri Hill, RN  Outcome: Progressing

## 2025-04-15 NOTE — PROGRESS NOTES
Physician Progress Note      PATIENT:               MAURA MESSER  CSN #:                  [id number]  :                       1975  ADMIT DATE:       2025 8:26 PM  DISCH DATE:        2025 2:43 PM  RESPONDING  PROVIDER #:        Josse Ni MD          QUERY TEXT:    \"Feculent peritonitis\" is documented in [facility] Brief Op Note. Based on your   medical judgment, please clarify the clinical significance of this diagnosis.    Hx: sigmoid colectomy for perforated diverticulitis in  [facility] Brief Op Note: \" Organ Space infection (below fascia) present as   evidenced by feculent peritonitis; Other Findings:  Small Colonic Perforation   at Prior Anastomosis\".    Rx: [facility] consult; OR Procedure; Pain control, will d/c scheduled dilaudid, and   change to prn for breakthrough, will add Toradol x 3 days; Continue Zosyn    The clinical indicators include:  Options provided:  -- Feculent peritonitis is clinically insignificant  -- Feculent peritonitis is not Clinically significant  -- Other - I will add my own diagnosis  -- Disagree - Not applicable / Not valid  -- Disagree - Clinically unable to determine / Unknown  -- Refer to Clinical Documentation Reviewer    PROVIDER RESPONSE TEXT:    The diagnosis og feculent peritonitis is clinically insignificant.    Query created by: Rand Aldana on 4/15/2025 1:12 PM      Electronically signed by:  Josse Ni MD 4/15/2025 4:09 PM

## 2025-04-24 NOTE — PROGRESS NOTES
Physician Progress Note      PATIENT:               MAURA MESSER  CSN #:                  [id number]  :                       1975  ADMIT DATE:       2025 8:26 PM  DISCH DATE:        2025 2:43 PM  RESPONDING  PROVIDER #:        Andrew Ugarte MD          QUERY TEXT:    Ischemic bowel is documented in the medical record PN by Shanel Morales MD on   . Please specify the acuity of this condition:    The clinical indicators include:  Patient came in with Acute abdominal pain and found to have perforated bowel   and colonic perforation for which she underwent Exploratory laparotomy, lysis   of adhesions and colectomy done and CTA shows ischemic bowel  Significant PMH of sigmoid colectomy for perforated diverticulitis in    PN by Andrew Ugarte \"On physical exam patient continues to report   severe abdominal pain this morning, severely tender to touch, concern for   ischemic bowel\"  Options provided:  -- Acute  -- Chronic  -- Acute on chronic  -- Other - I will add my own diagnosis  -- Disagree - Not applicable / Not valid  -- Disagree - Clinically unable to determine / Unknown  -- Refer to Clinical Documentation Reviewer    PROVIDER RESPONSE TEXT:    This is a chronic condition.    Query created by: Fidelina Clement on 2025 7:41 AM      Electronically signed by:  Andrew Ugarte MD 2025 3:44 PM

## 2025-05-13 NOTE — BSMART NOTE
[facility] Liaison Team Note     LOS:  2     Patient goal(s) for today: take medications as prescribed, make needs known in an appropriate manner.  [facility] Liaison team focus/goals: assess MH needs, provide therapeutic support, brief therapy, and education, as needed.  Assist medical care management team with recommendations for coordination of care. Trazodone     Progress note: UDS (+) amphetamines   BAL: 56.  Intentional OD on Wellbutrin, Lamictal, Klonapin.    Liaison met with pt, virtually, with psychiatric NP, Jess Guillen.  Pt is currently resting in bed with sitter at bedside.  She is alert, oriented, and tearful.  Pt reports she is \"not great, mentally\" because she survived her suicide attempt.  Pt states \"I'm more aware that the suicide attempt didn't work and I'm right back to where I started.  I'm not OK that it didn't work\".  Pt is agreeable to BHU at [facility] only.  Pt reports a bad experience at [facility] and will not go back there.   Liaison provided therapeutic support and encouragement, discussed benefits of BHU admission.      15:15:  Liaison LM for return call from RNRitika, to discuss medical clearance and labs.    15:30: IVONE Yost checking with attending for medical clearance and ordering COVID PCR.    16:20: PS Dr Koki Doshi, pt's attending, regarding medical clearance.  Also spoke with psychiatric NP, Jess Guillen, who advises Dr. Doshi is able to add  trazodone without discontinuing Latuda.    16:40: Dr. Doshi is medically clearing the pt and confirms she will document this in a note.  Updated Larene in Access.       If pt is no longer voluntary for a psychiatric admission to [facility] - contact [facility] for a TDO assessment.  [phone]    Barriers to Discharge: 2/8/24 pt is cleared for [facility]     Outpatient provider(s):  Psychiatrist, Dr. Webb.    Insurance info/prescription coverage:  [employer]    Diagnosis: major depression, mood disorder.  Per pt, Bipolar I    Plan:  [facility] recommended.  Please  This is a telemedicine visit with live, interactive video and audio.     Patient understands and accepts financial responsibility for any deductible, co-insurance and/or co-pays associated with this service.    SUBJECTIVE  Connects to follow up for ED visit for pelvic pain.  Had labs, vaginal swab, CT and US.  No significant fiindings.  Sent home . Ibuprofen helps. Pain is better and mild now.  Some abnormal labs for follow up.      HISTORY:  Past Medical History[1]   Past Surgical History[2]   Family History[3]   Short Social Hx on File[4]     Allergies[5]   Current Medications[6]    OBJECTIVE  Physical Exam:   alert, appears stated age, and cooperative, Speaking in full sentences comfortably, and age appropriate    ASSESSMENT & PLAN  Diagnoses and all orders for this visit:    Hyperglycemia  -     CBC With Differential With Platelet; Future  -     Basic Metabolic Panel (8); Future  -     Hemoglobin A1C; Future  -     Assay, Thyroid Stim Hormone; Future    Obesity (BMI 30.0-34.9)    12 mins reviewing chart, labs and radiology.  Recommend remain on ibuprofen as needed.  Hydrate well.  Follow up labs.  She is inquiring possbilbe GLP 1 for weight loss. She may be pre diabetic.  Follow up labs and repeat visit with me in one week.       Stephon De Anda,              [1]   Past Medical History:   Patient denies medical problems    01/24/24   [2]   Past Surgical History:  Procedure Laterality Date    Patient denies any surgical history      as of 01/24/2024   [3]   Family History  Problem Relation Age of Onset    Hypertension Mother     Other (brain aneurysm) Father 55    Hypertension Father     No Known Problems Brother     No Known Problems Brother     No Known Problems Brother     Breast Cancer Maternal Aunt 70    Ovarian Cancer Neg     Uterine Cancer Neg     Colon Cancer Neg    [4]   Social History  Socioeconomic History    Marital status: Single   Occupational History    Occupation:      Comment: @  [employer]   Tobacco Use    Smoking status: Never    Smokeless tobacco: Never   Vaping Use    Vaping status: Never Used   Substance and Sexual Activity    Alcohol use: Yes     Alcohol/week: 0.0 standard drinks of alcohol     Comment: 1 beer weekly    Drug use: No    Sexual activity: Yes     Partners: Male   Other Topics Concern    Blood Transfusions No    Caffeine Concern Yes     Comment: coffee, 1 cup daily   Social History Narrative    No H/O abuse     Feels safe    Lives alone   [5] No Known Allergies  [6]   Current Outpatient Medications   Medication Sig Dispense Refill    ibuprofen 600 MG Oral Tab Take 1 tablet (600 mg total) by mouth every 6 (six) hours as needed. 28 tablet 0    acetaminophen (TYLENOL) 325 MG Oral Tab Take 2 tablets (650 mg total) by mouth every 6 (six) hours as needed. 30 tablet 0

## (undated) DEVICE — SPONGE GZ W4XL4IN COT 12 PLY TYP VII WVN C FLD DSGN STERILE

## (undated) DEVICE — SUTURE VICRYL + SZ 3-0 L27IN ABSRB UD L26MM SH 1/2 CIR VCP416H

## (undated) DEVICE — BLANKET WRM W35.4XL86.6IN FULL UNDERBODY + FORC AIR

## (undated) DEVICE — SUTURE PERMAHAND SZ 3-0 L30IN NONABSORBABLE BLK SILK BRAID A304H

## (undated) DEVICE — STAPLER INT L75MM H1.5X1.8X2MM STD TI 6 ROW LIN CUT

## (undated) DEVICE — SOLUTION IRRIG 1000ML 09% SOD CHL USP PIC PLAS CONTAINER

## (undated) DEVICE — TOWEL,OR,DSP,ST,BLUE,STD,4/PK,20PK/CS: Brand: MEDLINE

## (undated) DEVICE — GLOVE ORANGE PI 7 1/2   MSG9075

## (undated) DEVICE — Device

## (undated) DEVICE — DRAPE FLD WRM W44XL66IN C6L FOR INTRATEMP SYS THERMABASIN

## (undated) DEVICE — PENCIL ES CRD L10FT HND SWCHING ROCK SWCH W/ EDGE COAT BLDE

## (undated) DEVICE — SUTURE PERMAHAND SZ 2-0 L30IN NONABSORBABLE BLK SILK W/O A305H

## (undated) DEVICE — 1LYRTR 16FR10ML100%SIL UMS SNP: Brand: MEDLINE INDUSTRIES, INC.

## (undated) DEVICE — SUTURE ABSORBABLE MONOFILAMENT 0 CTX 60 IN VIO PDS + PDP990G

## (undated) DEVICE — SEALER TISS L20CM DIA13MM ADV BPLR L CRV JAW OPN APPRCH

## (undated) DEVICE — SOLUTION IRRIG 1000ML STRL H2O USP PLAS POUR BTL

## (undated) DEVICE — RELOAD STPL L75MM OPN H3.8MM CLS 1.5MM WIRE DIA0.2MM REG

## (undated) DEVICE — SUTURE MONOCRYL + SZ 4-0 L27IN ABSRB UD L19MM PS-2 3/8 CIR MCP426H

## (undated) DEVICE — LARGE, DISPOSABLE ALEXIS O C-SECTION PROTECTOR - RETRACTOR: Brand: ALEXIS ® O C-SECTION PROTECTOR - RETRACTOR

## (undated) DEVICE — STAPLER INT L75MM CUT LN L73MM STPL LN L77MM BLU B FRM 8

## (undated) DEVICE — STAPLER INT L60MM REG TISS BLU B FRM 8 FIRING 2 ROW AUTO

## (undated) DEVICE — BLADE CLIPPER GEN PURP NS

## (undated) DEVICE — PAD,ABDOMINAL,5"X9",ST,LF,25/BX: Brand: MEDLINE INDUSTRIES, INC.

## (undated) DEVICE — H SOFT CLOTH SURGICAL TAPE, 2860S-6U, 6 IN X 2 YD, 15,2 CM X 1,8 M, SINGLE USE, UNPACKAGED, 16 RL/CASE: Brand: 3M™ MEDIPORE™

## (undated) DEVICE — SUTURE NONABSORBABLE MONOFILAMENT 2-0 FS 18 IN ETHILON 664H

## (undated) DEVICE — CANISTER, RIGID, 3000CC: Brand: MEDLINE INDUSTRIES, INC.

## (undated) DEVICE — LAPAROTOMY-SFMC: Brand: MEDLINE INDUSTRIES, INC.

## (undated) DEVICE — GLOVE SURG SZ 7 L12IN FNGR THK79MIL GRN LTX FREE

## (undated) DEVICE — SUTURE PDS II SZ 1 L36IN ABSRB VLT L48MM CTX 1/2 CIR Z371T

## (undated) DEVICE — SOLUTION IRRIG 1000ML 0.9% SOD CHL USP POUR PLAS BTL

## (undated) DEVICE — SUTURE PERMAHAND SZ 0 L30IN NONABSORBABLE BLK SILK BRAID A306H

## (undated) DEVICE — SEALER TISS L25CM DIA5MM ADV BPLR CRV TIP LAP APPRCH ENSEAL

## (undated) DEVICE — SUTURE VICRYL ABSORBABLE BRAIDED 2-0 CT 36 IN DA UD  VCP957H

## (undated) DEVICE — GLOVE ORANGE PI 7   MSG9070

## (undated) DEVICE — SOLUTION IRRIG 1000ML H2O PIC PLAS SHATTERPROOF CONTAINER

## (undated) DEVICE — STAPLER SKIN CLSR S STL NONABSORBABLE WIDE FIX HD HND GRP

## (undated) DEVICE — SUTURE PDS II SZ 1 L96IN ABSRB VLT TP-1 L65MM 1/2 CIR Z880G

## (undated) DEVICE — SUTURE PERMAHAND SZ 3-0 L18IN NONABSORBABLE BLK L26MM SH C013D

## (undated) DEVICE — BLADE ES L6IN ELASTOMERIC COAT EXT DURABLE BEND UPTO 90DEG